# Patient Record
Sex: FEMALE | Race: WHITE | NOT HISPANIC OR LATINO | URBAN - METROPOLITAN AREA
[De-identification: names, ages, dates, MRNs, and addresses within clinical notes are randomized per-mention and may not be internally consistent; named-entity substitution may affect disease eponyms.]

---

## 2023-06-08 LAB
COPPER, URINE: NORMAL
COPPER/CREATININE RATIO URINE: NORMAL
CREATININE URINE (LC): NORMAL
VOLUME OF URINE (LC): NORMAL

## 2023-06-09 LAB
ALANINE AMINOTRANSFERASE (SGPT) (U/L) IN SER/PLAS: 76 U/L (ref 7–45)
ALBUMIN (G/DL) IN SER/PLAS: 4.5 G/DL (ref 3.4–5)
ALKALINE PHOSPHATASE (U/L) IN SER/PLAS: 49 U/L (ref 33–110)
ALPHA-1 FETOPROTEIN (NG/ML) IN SER/PLAS: <4 NG/ML (ref 0–9)
ANION GAP IN SER/PLAS: 18 MMOL/L (ref 10–20)
ASPARTATE AMINOTRANSFERASE (SGOT) (U/L) IN SER/PLAS: 106 U/L (ref 9–39)
BASOPHILS (10*3/UL) IN BLOOD BY AUTOMATED COUNT: 0.08 X10E9/L (ref 0–0.1)
BASOPHILS/100 LEUKOCYTES IN BLOOD BY AUTOMATED COUNT: 1 % (ref 0–2)
BILIRUBIN DIRECT (MG/DL) IN SER/PLAS: 0.1 MG/DL (ref 0–0.3)
BILIRUBIN TOTAL (MG/DL) IN SER/PLAS: 0.4 MG/DL (ref 0–1.2)
CALCIUM (MG/DL) IN SER/PLAS: 10.3 MG/DL (ref 8.6–10.6)
CARBON DIOXIDE, TOTAL (MMOL/L) IN SER/PLAS: 29 MMOL/L (ref 21–32)
CHLORIDE (MMOL/L) IN SER/PLAS: 103 MMOL/L (ref 98–107)
CREATININE (MG/DL) IN SER/PLAS: 0.81 MG/DL (ref 0.5–1.05)
EOSINOPHILS (10*3/UL) IN BLOOD BY AUTOMATED COUNT: 0.17 X10E9/L (ref 0–0.7)
EOSINOPHILS/100 LEUKOCYTES IN BLOOD BY AUTOMATED COUNT: 2.1 % (ref 0–6)
ERYTHROCYTE DISTRIBUTION WIDTH (RATIO) BY AUTOMATED COUNT: 12.7 % (ref 11.5–14.5)
ERYTHROCYTE MEAN CORPUSCULAR HEMOGLOBIN CONCENTRATION (G/DL) BY AUTOMATED: 32.2 G/DL (ref 32–36)
ERYTHROCYTE MEAN CORPUSCULAR VOLUME (FL) BY AUTOMATED COUNT: 97 FL (ref 80–100)
ERYTHROCYTES (10*6/UL) IN BLOOD BY AUTOMATED COUNT: 4.18 X10E12/L (ref 4–5.2)
FERRITIN (UG/LL) IN SER/PLAS: 108 UG/L (ref 8–150)
GFR FEMALE: 89 ML/MIN/1.73M2
GLUCOSE (MG/DL) IN SER/PLAS: 110 MG/DL (ref 74–99)
HEMATOCRIT (%) IN BLOOD BY AUTOMATED COUNT: 40.4 % (ref 36–46)
HEMOGLOBIN (G/DL) IN BLOOD: 13 G/DL (ref 12–16)
HEPATITIS A TOTAL AB INTERPRETATION: REACTIVE
HEPATITIS B VIRUS SURFACE AB (MIU/ML) IN SERUM: <3.1 MIU/ML
HEPATITIS B VIRUS SURFACE AG PRESENCE IN SERUM: NONREACTIVE
HEPATITIS C VIRUS AB PRESENCE IN SERUM: NONREACTIVE
IMMATURE GRANULOCYTES/100 LEUKOCYTES IN BLOOD BY AUTOMATED COUNT: 0.7 % (ref 0–0.9)
INR IN PPP BY COAGULATION ASSAY: 1 (ref 0.9–1.1)
IRON (UG/DL) IN SER/PLAS: 72 UG/DL (ref 35–150)
IRON BINDING CAPACITY (UG/DL) IN SER/PLAS: 484 UG/DL (ref 240–445)
IRON SATURATION (%) IN SER/PLAS: 15 % (ref 25–45)
LEUKOCYTES (10*3/UL) IN BLOOD BY AUTOMATED COUNT: 8.1 X10E9/L (ref 4.4–11.3)
LYMPHOCYTES (10*3/UL) IN BLOOD BY AUTOMATED COUNT: 2.53 X10E9/L (ref 1.2–4.8)
LYMPHOCYTES/100 LEUKOCYTES IN BLOOD BY AUTOMATED COUNT: 31.1 % (ref 13–44)
MONOCYTES (10*3/UL) IN BLOOD BY AUTOMATED COUNT: 0.4 X10E9/L (ref 0.1–1)
MONOCYTES/100 LEUKOCYTES IN BLOOD BY AUTOMATED COUNT: 4.9 % (ref 2–10)
NEUTROPHILS (10*3/UL) IN BLOOD BY AUTOMATED COUNT: 4.89 X10E9/L (ref 1.2–7.7)
NEUTROPHILS/100 LEUKOCYTES IN BLOOD BY AUTOMATED COUNT: 60.2 % (ref 40–80)
NRBC (PER 100 WBCS) BY AUTOMATED COUNT: 0 /100 WBC (ref 0–0)
PLATELETS (10*3/UL) IN BLOOD AUTOMATED COUNT: 393 X10E9/L (ref 150–450)
POTASSIUM (MMOL/L) IN SER/PLAS: 4 MMOL/L (ref 3.5–5.3)
PROTEIN TOTAL: 7.4 G/DL (ref 6.4–8.2)
PROTHROMBIN TIME (PT) IN PPP BY COAGULATION ASSAY: 12 SEC (ref 9.8–13.4)
SODIUM (MMOL/L) IN SER/PLAS: 146 MMOL/L (ref 136–145)
UREA NITROGEN (MG/DL) IN SER/PLAS: 17 MG/DL (ref 6–23)

## 2023-06-10 LAB
ANTI-NUCLEAR ANTIBODY (ANA): NEGATIVE
MITOCHONDRIAL ANTIBODY: NEGATIVE

## 2023-06-12 LAB
ALPHA-1 ANTITRYPSIN PHENOTYPE: NORMAL
ALPHA-1-ANTITRYPSIN (REF): 183 MG/DL (ref 90–200)

## 2023-06-13 LAB — ANTI-SMOOTH MUSCLE ANTIBODY: ABNORMAL

## 2023-07-27 LAB
ALANINE AMINOTRANSFERASE (SGPT) (U/L) IN SER/PLAS: 74 U/L (ref 7–45)
ALBUMIN (G/DL) IN SER/PLAS: 4.5 G/DL (ref 3.4–5)
ALKALINE PHOSPHATASE (U/L) IN SER/PLAS: 50 U/L (ref 33–110)
ANION GAP IN SER/PLAS: 16 MMOL/L (ref 10–20)
ASPARTATE AMINOTRANSFERASE (SGOT) (U/L) IN SER/PLAS: 117 U/L (ref 9–39)
BASOPHILS (10*3/UL) IN BLOOD BY AUTOMATED COUNT: 0.07 X10E9/L (ref 0–0.1)
BASOPHILS/100 LEUKOCYTES IN BLOOD BY AUTOMATED COUNT: 0.9 % (ref 0–2)
BILIRUBIN DIRECT (MG/DL) IN SER/PLAS: 0.1 MG/DL (ref 0–0.3)
BILIRUBIN TOTAL (MG/DL) IN SER/PLAS: 0.7 MG/DL (ref 0–1.2)
CALCIUM (MG/DL) IN SER/PLAS: 10.1 MG/DL (ref 8.6–10.6)
CARBON DIOXIDE, TOTAL (MMOL/L) IN SER/PLAS: 29 MMOL/L (ref 21–32)
CHLORIDE (MMOL/L) IN SER/PLAS: 102 MMOL/L (ref 98–107)
CREATININE (MG/DL) IN SER/PLAS: 0.72 MG/DL (ref 0.5–1.05)
EOSINOPHILS (10*3/UL) IN BLOOD BY AUTOMATED COUNT: 0.07 X10E9/L (ref 0–0.7)
EOSINOPHILS/100 LEUKOCYTES IN BLOOD BY AUTOMATED COUNT: 0.9 % (ref 0–6)
ERYTHROCYTE DISTRIBUTION WIDTH (RATIO) BY AUTOMATED COUNT: 12.9 % (ref 11.5–14.5)
ERYTHROCYTE MEAN CORPUSCULAR HEMOGLOBIN CONCENTRATION (G/DL) BY AUTOMATED: 31.8 G/DL (ref 32–36)
ERYTHROCYTE MEAN CORPUSCULAR VOLUME (FL) BY AUTOMATED COUNT: 97 FL (ref 80–100)
ERYTHROCYTES (10*6/UL) IN BLOOD BY AUTOMATED COUNT: 4.37 X10E12/L (ref 4–5.2)
GFR FEMALE: >90 ML/MIN/1.73M2
GLUCOSE (MG/DL) IN SER/PLAS: 101 MG/DL (ref 74–99)
HEMATOCRIT (%) IN BLOOD BY AUTOMATED COUNT: 42.2 % (ref 36–46)
HEMOGLOBIN (G/DL) IN BLOOD: 13.4 G/DL (ref 12–16)
IMMATURE GRANULOCYTES/100 LEUKOCYTES IN BLOOD BY AUTOMATED COUNT: 0.4 % (ref 0–0.9)
INR IN PPP BY COAGULATION ASSAY: 1.1 (ref 0.9–1.1)
LEUKOCYTES (10*3/UL) IN BLOOD BY AUTOMATED COUNT: 8.1 X10E9/L (ref 4.4–11.3)
LYMPHOCYTES (10*3/UL) IN BLOOD BY AUTOMATED COUNT: 2.21 X10E9/L (ref 1.2–4.8)
LYMPHOCYTES/100 LEUKOCYTES IN BLOOD BY AUTOMATED COUNT: 27.3 % (ref 13–44)
MONOCYTES (10*3/UL) IN BLOOD BY AUTOMATED COUNT: 0.41 X10E9/L (ref 0.1–1)
MONOCYTES/100 LEUKOCYTES IN BLOOD BY AUTOMATED COUNT: 5.1 % (ref 2–10)
NEUTROPHILS (10*3/UL) IN BLOOD BY AUTOMATED COUNT: 5.32 X10E9/L (ref 1.2–7.7)
NEUTROPHILS/100 LEUKOCYTES IN BLOOD BY AUTOMATED COUNT: 65.4 % (ref 40–80)
NRBC (PER 100 WBCS) BY AUTOMATED COUNT: 0 /100 WBC (ref 0–0)
PLATELETS (10*3/UL) IN BLOOD AUTOMATED COUNT: 342 X10E9/L (ref 150–450)
POTASSIUM (MMOL/L) IN SER/PLAS: 3.8 MMOL/L (ref 3.5–5.3)
PROTEIN TOTAL: 7.7 G/DL (ref 6.4–8.2)
PROTHROMBIN TIME (PT) IN PPP BY COAGULATION ASSAY: 12.3 SEC (ref 9.8–12.8)
SODIUM (MMOL/L) IN SER/PLAS: 143 MMOL/L (ref 136–145)
UREA NITROGEN (MG/DL) IN SER/PLAS: 15 MG/DL (ref 6–23)

## 2023-08-02 ENCOUNTER — HOSPITAL ENCOUNTER (OUTPATIENT)
Dept: DATA CONVERSION | Facility: HOSPITAL | Age: 48
End: 2023-08-02
Attending: STUDENT IN AN ORGANIZED HEALTH CARE EDUCATION/TRAINING PROGRAM | Admitting: STUDENT IN AN ORGANIZED HEALTH CARE EDUCATION/TRAINING PROGRAM
Payer: MEDICARE

## 2023-08-02 DIAGNOSIS — K74.60 UNSPECIFIED CIRRHOSIS OF LIVER (MULTI): ICD-10-CM

## 2023-08-02 DIAGNOSIS — R74.01 ELEVATION OF LEVELS OF LIVER TRANSAMINASE LEVELS: ICD-10-CM

## 2023-08-15 LAB
COMPLETE PATHOLOGY REPORT: NORMAL
CONVERTED ADDENDUM DIAGNOSIS 2: NORMAL
CONVERTED CLINICAL DIAGNOSIS-HISTORY: NORMAL
CONVERTED FINAL DIAGNOSIS: NORMAL
CONVERTED FINAL REPORT PDF LINK TO COPY AND PASTE: NORMAL
CONVERTED GROSS DESCRIPTION: NORMAL

## 2023-09-08 LAB — CERULOPLASMIN (MG/DL) IN SER/PLAS: 31 MG/DL (ref 20–60)

## 2023-09-29 VITALS — BODY MASS INDEX: 29.26 KG/M2 | HEIGHT: 56 IN | WEIGHT: 130.07 LBS

## 2023-10-02 ENCOUNTER — APPOINTMENT (OUTPATIENT)
Dept: LAB | Facility: LAB | Age: 48
End: 2023-10-02
Payer: MEDICARE

## 2023-10-02 ENCOUNTER — LAB (OUTPATIENT)
Dept: LAB | Facility: LAB | Age: 48
End: 2023-10-02
Payer: MEDICARE

## 2023-10-02 DIAGNOSIS — R74.01 ELEVATION OF LEVELS OF LIVER TRANSAMINASE LEVELS: Primary | ICD-10-CM

## 2023-10-02 PROCEDURE — 36415 COLL VENOUS BLD VENIPUNCTURE: CPT

## 2023-10-13 LAB — SCAN RESULT: NORMAL

## 2023-10-18 ENCOUNTER — TELEPHONE (OUTPATIENT)
Dept: GASTROENTEROLOGY | Facility: CLINIC | Age: 48
End: 2023-10-18
Payer: MEDICARE

## 2023-10-18 DIAGNOSIS — E83.01: ICD-10-CM

## 2023-10-18 DIAGNOSIS — K74.60 HEPATIC CIRRHOSIS, UNSPECIFIED HEPATIC CIRRHOSIS TYPE, UNSPECIFIED WHETHER ASCITES PRESENT (MULTI): ICD-10-CM

## 2023-10-18 RX ORDER — ZINC GLUCONATE 50 MG
50 TABLET ORAL 2 TIMES DAILY
Qty: 60 TABLET | Refills: 11 | Status: SHIPPED | OUTPATIENT
Start: 2023-10-18

## 2023-10-18 NOTE — TELEPHONE ENCOUNTER
Result Communication    Resulted Orders   wilison discese test; [facility] - Miscellaneous Genetics Test   Result Value Ref Range    Scan Result See Scanned Result        9:10 AM      Results were successfully communicated with the mother and they acknowledged their understanding.

## 2023-10-18 NOTE — TELEPHONE ENCOUNTER
Called patient to discuss results and plan per Dr Vann:  Spoke to patients mother.   Start zinc elemental dosing of 50 mg BID. She should work on JACKSON risk factors. She can see us back in clinic in about 2 months. She can get US and AFP and MELD labs before that.

## 2023-12-21 DIAGNOSIS — E78.5 HYPERLIPIDEMIA, UNSPECIFIED: ICD-10-CM

## 2023-12-21 RX ORDER — LOVASTATIN 20 MG/1
TABLET ORAL
Qty: 90 TABLET | Refills: 1 | Status: SHIPPED | OUTPATIENT
Start: 2023-12-21

## 2024-01-10 ENCOUNTER — ANCILLARY PROCEDURE (OUTPATIENT)
Dept: RADIOLOGY | Facility: CLINIC | Age: 49
End: 2024-01-10
Payer: MEDICARE

## 2024-01-10 DIAGNOSIS — E83.01: ICD-10-CM

## 2024-01-10 PROCEDURE — 76705 ECHO EXAM OF ABDOMEN: CPT

## 2024-01-10 PROCEDURE — 76705 ECHO EXAM OF ABDOMEN: CPT | Performed by: RADIOLOGY

## 2024-01-17 PROCEDURE — 82248 BILIRUBIN DIRECT: CPT | Performed by: INTERNAL MEDICINE

## 2024-01-17 PROCEDURE — 85610 PROTHROMBIN TIME: CPT | Performed by: INTERNAL MEDICINE

## 2024-01-17 PROCEDURE — 80048 BASIC METABOLIC PNL TOTAL CA: CPT | Performed by: INTERNAL MEDICINE

## 2024-01-17 PROCEDURE — 82105 ALPHA-FETOPROTEIN SERUM: CPT | Performed by: INTERNAL MEDICINE

## 2024-01-21 DIAGNOSIS — E03.9 HYPOTHYROIDISM, UNSPECIFIED: ICD-10-CM

## 2024-01-21 DIAGNOSIS — E78.5 HYPERLIPIDEMIA, UNSPECIFIED: ICD-10-CM

## 2024-01-22 RX ORDER — FENOFIBRATE 134 MG/1
CAPSULE ORAL
Qty: 30 CAPSULE | Refills: 0 | Status: SHIPPED | OUTPATIENT
Start: 2024-01-22 | End: 2024-02-15

## 2024-01-22 RX ORDER — LEVOTHYROXINE SODIUM 25 UG/1
TABLET ORAL
Qty: 30 TABLET | Refills: 0 | Status: SHIPPED | OUTPATIENT
Start: 2024-01-22 | End: 2024-02-15

## 2024-01-31 ENCOUNTER — OFFICE VISIT (OUTPATIENT)
Dept: GASTROENTEROLOGY | Facility: CLINIC | Age: 49
End: 2024-01-31
Payer: MEDICARE

## 2024-01-31 VITALS
DIASTOLIC BLOOD PRESSURE: 80 MMHG | WEIGHT: 121 LBS | TEMPERATURE: 97.4 F | HEIGHT: 56 IN | BODY MASS INDEX: 27.22 KG/M2 | HEART RATE: 96 BPM | SYSTOLIC BLOOD PRESSURE: 121 MMHG

## 2024-01-31 DIAGNOSIS — K74.60 HEPATIC CIRRHOSIS, UNSPECIFIED HEPATIC CIRRHOSIS TYPE, UNSPECIFIED WHETHER ASCITES PRESENT (MULTI): ICD-10-CM

## 2024-01-31 DIAGNOSIS — K74.60 ESOPHAGEAL VARICES IN CIRRHOSIS (MULTI): ICD-10-CM

## 2024-01-31 DIAGNOSIS — I85.10 ESOPHAGEAL VARICES IN CIRRHOSIS (MULTI): ICD-10-CM

## 2024-01-31 PROCEDURE — 99214 OFFICE O/P EST MOD 30 MIN: CPT | Performed by: INTERNAL MEDICINE

## 2024-01-31 RX ORDER — ACETAMINOPHEN 500 MG
1 TABLET ORAL DAILY
COMMUNITY
Start: 2014-02-25

## 2024-01-31 RX ORDER — ASPIRIN 325 MG
TABLET ORAL
COMMUNITY

## 2024-01-31 RX ORDER — FLUTICASONE PROPIONATE 50 MCG
1 SPRAY, SUSPENSION (ML) NASAL 2 TIMES DAILY
COMMUNITY
Start: 2023-12-16 | End: 2024-05-21 | Stop reason: ALTCHOICE

## 2024-01-31 RX ORDER — FLUOXETINE HYDROCHLORIDE 20 MG/1
60 CAPSULE ORAL
COMMUNITY
Start: 2014-09-15

## 2024-01-31 RX ORDER — DIVALPROEX SODIUM 500 MG/1
500 TABLET, DELAYED RELEASE ORAL
COMMUNITY

## 2024-01-31 RX ORDER — ONDANSETRON 4 MG/1
1 TABLET, FILM COATED ORAL EVERY 8 HOURS PRN
COMMUNITY
Start: 2019-12-16 | End: 2024-05-21 | Stop reason: ALTCHOICE

## 2024-01-31 RX ORDER — BROMPHENIRAMINE MALEATE, PSEUDOEPHEDRINE HYDROCHLORIDE, AND DEXTROMETHORPHAN HYDROBROMIDE 2; 30; 10 MG/5ML; MG/5ML; MG/5ML
10 SYRUP ORAL EVERY 6 HOURS PRN
COMMUNITY
Start: 2023-12-16 | End: 2024-05-21 | Stop reason: ALTCHOICE

## 2024-01-31 RX ORDER — DIVALPROEX SODIUM 250 MG/1
250 TABLET, FILM COATED, EXTENDED RELEASE ORAL DAILY
COMMUNITY
Start: 2014-04-10

## 2024-01-31 RX ORDER — ARIPIPRAZOLE 15 MG/1
15 TABLET ORAL
COMMUNITY
Start: 2015-07-14

## 2024-01-31 RX ORDER — ACETAMINOPHEN 500 MG
TABLET ORAL
COMMUNITY

## 2024-01-31 RX ORDER — ASCORBIC ACID 125 MG
TABLET,CHEWABLE ORAL
COMMUNITY

## 2024-01-31 RX ORDER — FLUOXETINE HYDROCHLORIDE 60 MG/1
TABLET, FILM COATED ORAL; ORAL
COMMUNITY
Start: 2023-01-31

## 2024-01-31 NOTE — PATIENT INSTRUCTIONS
Work on diet and exercise.    Get labs in 3 months and in 6 months.    Continue the zinc.    Get an ultrasound in 6 months.    Get an upper endoscopy.    Make an appointment to see me back in clinic in about 7 months.    Check with your insurance company about arranging hepatitis B vaccines.

## 2024-02-07 ENCOUNTER — OFFICE VISIT (OUTPATIENT)
Dept: PRIMARY CARE | Facility: CLINIC | Age: 49
End: 2024-02-07
Payer: MEDICARE

## 2024-02-07 VITALS
DIASTOLIC BLOOD PRESSURE: 73 MMHG | HEART RATE: 96 BPM | BODY MASS INDEX: 27.58 KG/M2 | WEIGHT: 123 LBS | SYSTOLIC BLOOD PRESSURE: 123 MMHG | TEMPERATURE: 97 F | OXYGEN SATURATION: 98 %

## 2024-02-07 DIAGNOSIS — K74.69 OTHER CIRRHOSIS OF LIVER (MULTI): ICD-10-CM

## 2024-02-07 DIAGNOSIS — Z00.00 MEDICARE ANNUAL WELLNESS VISIT, SUBSEQUENT: Primary | ICD-10-CM

## 2024-02-07 DIAGNOSIS — E55.9 VITAMIN D DEFICIENCY: ICD-10-CM

## 2024-02-07 DIAGNOSIS — R74.01 HIGH ALANINE AMINOTRANSFERASE (ALT) LEVEL: ICD-10-CM

## 2024-02-07 DIAGNOSIS — E78.5 HYPERLIPIDEMIA, UNSPECIFIED HYPERLIPIDEMIA TYPE: ICD-10-CM

## 2024-02-07 DIAGNOSIS — E03.9 HYPOTHYROIDISM, UNSPECIFIED TYPE: ICD-10-CM

## 2024-02-07 DIAGNOSIS — F34.81 DISRUPTIVE MOOD DYSREGULATION DISORDER (MULTI): Chronic | ICD-10-CM

## 2024-02-07 PROBLEM — E66.3 OVERWEIGHT (BMI 25.0-29.9): Status: ACTIVE | Noted: 2022-12-02

## 2024-02-07 PROBLEM — R79.89 ELEVATED PLATELET COUNT: Status: ACTIVE | Noted: 2024-02-07

## 2024-02-07 PROBLEM — E83.52 HYPERCALCEMIA: Status: ACTIVE | Noted: 2024-02-07

## 2024-02-07 PROBLEM — I89.0 LYMPHEDEMA: Status: ACTIVE | Noted: 2024-02-07

## 2024-02-07 PROBLEM — R79.89 ELEVATED PLATELET COUNT: Status: RESOLVED | Noted: 2024-02-07 | Resolved: 2024-02-07

## 2024-02-07 PROBLEM — R92.8 MAMMOGRAM ABNORMAL: Status: RESOLVED | Noted: 2024-02-07 | Resolved: 2024-02-07

## 2024-02-07 PROBLEM — D13.4 ADENOMA OF LIVER: Status: ACTIVE | Noted: 2018-08-06

## 2024-02-07 PROBLEM — Q93.82 WILLIAMS SYNDROME (HHS-HCC): Status: ACTIVE | Noted: 2024-02-07

## 2024-02-07 PROBLEM — E83.52 HYPERCALCEMIA: Status: RESOLVED | Noted: 2024-02-07 | Resolved: 2024-02-07

## 2024-02-07 PROBLEM — R25.1 TREMOR: Status: ACTIVE | Noted: 2024-02-07

## 2024-02-07 PROBLEM — R92.8 MAMMOGRAM ABNORMAL: Status: ACTIVE | Noted: 2024-02-07

## 2024-02-07 PROCEDURE — 99212 OFFICE O/P EST SF 10 MIN: CPT | Performed by: PEDIATRICS

## 2024-02-07 PROCEDURE — G0439 PPPS, SUBSEQ VISIT: HCPCS | Performed by: PEDIATRICS

## 2024-02-07 ASSESSMENT — PATIENT HEALTH QUESTIONNAIRE - PHQ9
2. FEELING DOWN, DEPRESSED OR HOPELESS: NOT AT ALL
1. LITTLE INTEREST OR PLEASURE IN DOING THINGS: NOT AT ALL
SUM OF ALL RESPONSES TO PHQ9 QUESTIONS 1 AND 2: 0

## 2024-02-07 ASSESSMENT — ACTIVITIES OF DAILY LIVING (ADL)
GROCERY_SHOPPING: TOTAL CARE
TAKING_MEDICATION: TOTAL CARE
DRESSING: INDEPENDENT
DOING_HOUSEWORK: NEEDS ASSISTANCE
MANAGING_FINANCES: TOTAL CARE
BATHING: INDEPENDENT

## 2024-02-07 NOTE — PROGRESS NOTES
Subjective   Patient ID: Elizabeth Toledo is a 48 y.o. female who presents for Medicare Wellness    HPI   Mammogram done November 2023 was negative. Due every year. Cologuard done January 2023 was normal.   Saw GYN last year  Seeing hepatology for cirrhosis of unclear etiology. Needs hep B vaccine    Review of Systems    Objective   /73   Pulse 96   Temp 36.1 °C (97 °F)   Wt 55.8 kg (123 lb)   SpO2 98%   BMI 27.58 kg/m²     Physical Exam  HEENT neg  Lymphedema left hand and right calf  Lungs clear  Heart RRR  Abd soft  Some resting tremor of mouth and right hand  Assessment/Plan   Problem List Items Addressed This Visit             ICD-10-CM    High alanine aminotransferase (ALT) level R74.01    Hyperlipidemia E78.5    Relevant Orders    Lipid panel    Hypothyroidism E03.9    Relevant Orders    TSH    Disruptive mood dysregulation disorder (CMS/HCC) (Chronic) F34.81    Relevant Orders    Valproic Acid    Vitamin D deficiency E55.9    Relevant Orders    Vitamin D 25-Hydroxy,Total (for eval of Vitamin D levels)    Other cirrhosis of liver (CMS/HCC) K74.69     Other Visit Diagnoses         Codes    Medicare annual wellness visit, subsequent    -  Primary Z00.00

## 2024-02-08 DIAGNOSIS — K74.69 OTHER CIRRHOSIS OF LIVER (MULTI): ICD-10-CM

## 2024-02-14 DIAGNOSIS — E78.5 HYPERLIPIDEMIA, UNSPECIFIED: ICD-10-CM

## 2024-02-14 DIAGNOSIS — E03.9 HYPOTHYROIDISM, UNSPECIFIED: ICD-10-CM

## 2024-02-15 RX ORDER — FENOFIBRATE 134 MG/1
CAPSULE ORAL
Qty: 90 CAPSULE | Refills: 1 | Status: SHIPPED | OUTPATIENT
Start: 2024-02-15

## 2024-02-15 RX ORDER — LEVOTHYROXINE SODIUM 25 UG/1
TABLET ORAL
Qty: 30 TABLET | Refills: 0 | Status: SHIPPED | OUTPATIENT
Start: 2024-02-15 | End: 2024-03-12

## 2024-02-19 ENCOUNTER — TELEPHONE (OUTPATIENT)
Dept: PRIMARY CARE | Facility: CLINIC | Age: 49
End: 2024-02-19

## 2024-02-19 ENCOUNTER — LAB (OUTPATIENT)
Dept: LAB | Facility: LAB | Age: 49
End: 2024-02-19
Payer: MEDICARE

## 2024-02-19 DIAGNOSIS — E78.5 HYPERLIPIDEMIA, UNSPECIFIED: ICD-10-CM

## 2024-02-19 DIAGNOSIS — F34.81 DISRUPTIVE MOOD DYSREGULATION DISORDER (MULTI): ICD-10-CM

## 2024-02-19 DIAGNOSIS — E55.9 VITAMIN D DEFICIENCY, UNSPECIFIED: Primary | ICD-10-CM

## 2024-02-19 DIAGNOSIS — E03.9 HYPOTHYROIDISM, UNSPECIFIED: ICD-10-CM

## 2024-02-26 ENCOUNTER — LAB (OUTPATIENT)
Dept: LAB | Facility: LAB | Age: 49
End: 2024-02-26
Payer: MEDICARE

## 2024-02-26 DIAGNOSIS — E78.5 HYPERLIPIDEMIA, UNSPECIFIED: ICD-10-CM

## 2024-02-26 DIAGNOSIS — F34.81 DISRUPTIVE MOOD DYSREGULATION DISORDER (MULTI): ICD-10-CM

## 2024-02-26 DIAGNOSIS — E55.9 VITAMIN D DEFICIENCY, UNSPECIFIED: ICD-10-CM

## 2024-02-26 DIAGNOSIS — E03.9 HYPOTHYROIDISM, UNSPECIFIED: ICD-10-CM

## 2024-02-26 PROCEDURE — 80164 ASSAY DIPROPYLACETIC ACD TOT: CPT

## 2024-02-26 PROCEDURE — 36415 COLL VENOUS BLD VENIPUNCTURE: CPT

## 2024-02-26 PROCEDURE — 80061 LIPID PANEL: CPT

## 2024-02-26 PROCEDURE — 82306 VITAMIN D 25 HYDROXY: CPT

## 2024-02-26 PROCEDURE — 84443 ASSAY THYROID STIM HORMONE: CPT

## 2024-02-27 LAB
25(OH)D3 SERPL-MCNC: 77 NG/ML (ref 30–100)
CHOLEST SERPL-MCNC: 195 MG/DL (ref 0–199)
CHOLESTEROL/HDL RATIO: 3.5
HDLC SERPL-MCNC: 55.4 MG/DL
LDLC SERPL CALC-MCNC: 112 MG/DL
NON HDL CHOLESTEROL: 140 MG/DL (ref 0–149)
TRIGL SERPL-MCNC: 136 MG/DL (ref 0–149)
TSH SERPL-ACNC: 3.66 MIU/L (ref 0.44–3.98)
VALPROATE SERPL-MCNC: 41 UG/ML (ref 50–100)
VLDL: 27 MG/DL (ref 0–40)

## 2024-03-11 DIAGNOSIS — E03.9 HYPOTHYROIDISM, UNSPECIFIED: ICD-10-CM

## 2024-03-12 RX ORDER — LEVOTHYROXINE SODIUM 25 UG/1
TABLET ORAL
Qty: 90 TABLET | Refills: 3 | Status: SHIPPED | OUTPATIENT
Start: 2024-03-12

## 2024-03-20 ENCOUNTER — TELEPHONE (OUTPATIENT)
Dept: PRIMARY CARE | Facility: CLINIC | Age: 49
End: 2024-03-20
Payer: MEDICARE

## 2024-03-20 DIAGNOSIS — I89.0 LYMPHEDEMA: Primary | ICD-10-CM

## 2024-05-08 ENCOUNTER — LAB (OUTPATIENT)
Dept: LAB | Facility: LAB | Age: 49
End: 2024-05-08
Payer: MEDICARE

## 2024-05-08 DIAGNOSIS — K74.60 HEPATIC CIRRHOSIS, UNSPECIFIED HEPATIC CIRRHOSIS TYPE, UNSPECIFIED WHETHER ASCITES PRESENT (MULTI): ICD-10-CM

## 2024-05-08 PROCEDURE — 85610 PROTHROMBIN TIME: CPT

## 2024-05-08 PROCEDURE — 82248 BILIRUBIN DIRECT: CPT

## 2024-05-08 PROCEDURE — 80053 COMPREHEN METABOLIC PANEL: CPT

## 2024-05-08 PROCEDURE — 36415 COLL VENOUS BLD VENIPUNCTURE: CPT

## 2024-05-08 PROCEDURE — 85025 COMPLETE CBC W/AUTO DIFF WBC: CPT

## 2024-05-09 ENCOUNTER — OFFICE VISIT (OUTPATIENT)
Dept: CARDIOLOGY | Facility: CLINIC | Age: 49
End: 2024-05-09
Payer: MEDICARE

## 2024-05-09 VITALS
HEART RATE: 82 BPM | WEIGHT: 123.5 LBS | OXYGEN SATURATION: 98 % | DIASTOLIC BLOOD PRESSURE: 84 MMHG | HEIGHT: 56 IN | SYSTOLIC BLOOD PRESSURE: 121 MMHG | BODY MASS INDEX: 27.78 KG/M2 | RESPIRATION RATE: 17 BRPM

## 2024-05-09 DIAGNOSIS — I87.2 CHRONIC VENOUS INSUFFICIENCY: Primary | ICD-10-CM

## 2024-05-09 DIAGNOSIS — I89.0 LYMPHEDEMA: ICD-10-CM

## 2024-05-09 LAB
ALBUMIN SERPL BCP-MCNC: 4.4 G/DL (ref 3.4–5)
ALP SERPL-CCNC: 84 U/L (ref 33–110)
ALT SERPL W P-5'-P-CCNC: 55 U/L (ref 7–45)
ANION GAP SERPL CALC-SCNC: 16 MMOL/L (ref 10–20)
AST SERPL W P-5'-P-CCNC: 89 U/L (ref 9–39)
BASOPHILS # BLD AUTO: 0.07 X10*3/UL (ref 0–0.1)
BASOPHILS NFR BLD AUTO: 0.9 %
BILIRUB DIRECT SERPL-MCNC: 0.2 MG/DL (ref 0–0.3)
BILIRUB SERPL-MCNC: 0.7 MG/DL (ref 0–1.2)
BUN SERPL-MCNC: 10 MG/DL (ref 6–23)
CALCIUM SERPL-MCNC: 9.6 MG/DL (ref 8.6–10.6)
CHLORIDE SERPL-SCNC: 103 MMOL/L (ref 98–107)
CO2 SERPL-SCNC: 27 MMOL/L (ref 21–32)
CREAT SERPL-MCNC: 0.64 MG/DL (ref 0.5–1.05)
EGFRCR SERPLBLD CKD-EPI 2021: >90 ML/MIN/1.73M*2
EOSINOPHIL # BLD AUTO: 0.1 X10*3/UL (ref 0–0.7)
EOSINOPHIL NFR BLD AUTO: 1.4 %
ERYTHROCYTE [DISTWIDTH] IN BLOOD BY AUTOMATED COUNT: 13 % (ref 11.5–14.5)
GLUCOSE SERPL-MCNC: 119 MG/DL (ref 74–99)
HCT VFR BLD AUTO: 41 % (ref 36–46)
HGB BLD-MCNC: 13.5 G/DL (ref 12–16)
IMM GRANULOCYTES # BLD AUTO: 0.04 X10*3/UL (ref 0–0.7)
IMM GRANULOCYTES NFR BLD AUTO: 0.5 % (ref 0–0.9)
INR PPP: 0.9 (ref 0.9–1.1)
LYMPHOCYTES # BLD AUTO: 1.83 X10*3/UL (ref 1.2–4.8)
LYMPHOCYTES NFR BLD AUTO: 24.8 %
MCH RBC QN AUTO: 31.8 PG (ref 26–34)
MCHC RBC AUTO-ENTMCNC: 32.9 G/DL (ref 32–36)
MCV RBC AUTO: 97 FL (ref 80–100)
MONOCYTES # BLD AUTO: 0.34 X10*3/UL (ref 0.1–1)
MONOCYTES NFR BLD AUTO: 4.6 %
NEUTROPHILS # BLD AUTO: 5 X10*3/UL (ref 1.2–7.7)
NEUTROPHILS NFR BLD AUTO: 67.8 %
NRBC BLD-RTO: 0 /100 WBCS (ref 0–0)
PLATELET # BLD AUTO: 284 X10*3/UL (ref 150–450)
POTASSIUM SERPL-SCNC: 4 MMOL/L (ref 3.5–5.3)
PROT SERPL-MCNC: 7.8 G/DL (ref 6.4–8.2)
PROTHROMBIN TIME: 10.5 SECONDS (ref 9.8–12.8)
RBC # BLD AUTO: 4.25 X10*6/UL (ref 4–5.2)
SODIUM SERPL-SCNC: 142 MMOL/L (ref 136–145)
WBC # BLD AUTO: 7.4 X10*3/UL (ref 4.4–11.3)

## 2024-05-09 PROCEDURE — 1036F TOBACCO NON-USER: CPT | Performed by: INTERNAL MEDICINE

## 2024-05-09 PROCEDURE — 99213 OFFICE O/P EST LOW 20 MIN: CPT | Performed by: INTERNAL MEDICINE

## 2024-05-09 PROCEDURE — 99203 OFFICE O/P NEW LOW 30 MIN: CPT | Performed by: INTERNAL MEDICINE

## 2024-05-09 ASSESSMENT — PAIN SCALES - GENERAL: PAINLEVEL: 0-NO PAIN

## 2024-05-09 NOTE — PROGRESS NOTES
Referred by Gretchen Hwang MD for lymphedema    No chief complaint on file.      History of Present Illness:  Elizabeth Toledo is a/an 48 y.o. woman accompanied by her parents who help provide some of the history. Ms. Toledo is here for evaluation of left leg swelling. It has been present for about 10 years and is painless and does not bother the patient. She has mild varicose veins that her mother notes more prominently on the left leg than the right. She has not worn compression socks. She is fairly active; goes to a day program where she might do arts and crafts or dancing or other physical activities.     Swelling is somewhat worse toward the end of the day. Mom notices that the calf feels full.    No past medical history on file.  Past Surgical History:   Procedure Laterality Date    US GUIDED NEEDLE LIVER BIOPSY  8/2/2023    US GUIDED NEEDLE LIVER BIOPSY 8/2/2023 [facility]     Social History     Tobacco Use    Smoking status: Never     Passive exposure: Never    Smokeless tobacco: Never     No family history on file.  Current Outpatient Medications   Medication Sig Dispense Refill    ARIPiprazole (Abilify) 15 mg tablet Take 1 tablet (15 mg) by mouth once daily.      cholecalciferol (Vitamin D-3) 50 mcg (2,000 unit) capsule Take by mouth.      divalproex (Depakote ER) 250 mg 24 hr tablet Take 1 tablet (250 mg) by mouth once daily. At night      fenofibrate micronized (Lofibra) 134 mg capsule TAKE 1 CAPSULE BY MOUTH EVERY DAY 90 capsule 1    FLUoxetine (PROzac) 20 mg capsule Take 3 capsules (60 mg) by mouth once daily.      hepatitis B virus vacc.rec,PF, (ENGERIX-B) 20 mcg/mL syringe Give a dose now and in 2 months 1 mL 2    levothyroxine (Synthroid, Levoxyl) 25 mcg tablet TAKE 1 TABLET BY MOUTH EVERY DAY 90 tablet 3    lovastatin (Mevacor) 20 mg tablet TAKE 1 TABLET BY MOUTH EVERY DAY 90 tablet 1    multivitamin (One Daily Multivitamin) tablet Take by mouth.      zinc gluconate 50 mg tablet Take 1 tablet (50 mg of  "elemental zinc) by mouth 2 times a day. 60 tablet 11    brompheniramine-pseudoeph-DM 2-30-10 mg/5 mL syrup Take 10 mL by mouth every 6 hours if needed.      cholecalciferol (Vitamin D-3) 5,000 Units tablet Take 1 tablet (5,000 Units) by mouth once daily.      cholecalciferol, vitamin D3, 25 mcg (1,000 unit) tablet,chewable Chew.      divalproex (Depakote) 500 mg EC tablet Take 1 tablet (500 mg) by mouth.      FLUoxetine (PROzac) 60 mg tablet Take by mouth.      fluticasone (Flonase) 50 mcg/actuation nasal spray 1 spray by Does not apply route twice a day.      ondansetron (Zofran) 4 mg tablet Take 1 tablet (4 mg) by mouth every 8 hours if needed.       No current facility-administered medications for this visit.       Physical Examination:  Blood pressure 121/84, pulse 82, resp. rate 17, height 1.422 m (4' 8\"), weight 56 kg (123 lb 8 oz), SpO2 98%.  General: well-developed, well-nourished, no distress, short stature  Head: normocephalic, atraumatic  Eyes: PERRL, anicteric sclerae, no conjunctival injection  ENT: normal oropharynx  Neck: supple, no carotid bruits, no JVD  Lungs: normal respiratory effort, clear to auscultation bilaterally  Heart: regular, normal S1 and S2, no murmurs, rubs or gallops  Abdomen: normal active bowel sounds, soft, non-distended, non-tender  Extremities: no cyanosis or clubbing  Vascular: mild right leg edema with slight squaring of toes, pulses 2+ and symmetric; bilateral spider and reticular veins  Musculoskeletal: no deformities  Neurological: alert and oriented, no gross neurological deficits  Psychological: normal mood and affect   Skin: warm and dry, no rashes or lesions      Pertinent Labs:    Pertinent Imaging:    Encounter Diagnoses   Name Primary?    Lymphedema     Chronic venous insufficiency Yes   You can get compression socks at [employer] or any other store that sells durable medical goods.     Once you have decided where you are going to go for socks, call in " advance and find out when the person who measures you for socks will be there. Plan to go fairly early in the day. Many people have swelling that is worse toward the end of the day. If you are measured at the end of the day, you may not get a good fit.    You should put on your compression socks first thing in the morning. Take them off before bed.    If you use lotion or moisturizer on your legs, do NOT put lotion or moisturizer on immediately before you put on your socks, as it will make them difficult to pull up. Use your lotion or moisturizer at night.    Compression socks can be hand-washed or washed in a mesh bag in the washing machine. Air dry them. Do NOT put them in the dryer.        Yesika Vides MD, MS

## 2024-05-09 NOTE — PATIENT INSTRUCTIONS
You can get compression socks at [employer] or any other store that sells durable medical goods.     Once you have decided where you are going to go for socks, call in advance and find out when the person who measures you for socks will be there. Plan to go fairly early in the day. Many people have swelling that is worse toward the end of the day. If you are measured at the end of the day, you may not get a good fit.    You should put on your compression socks first thing in the morning. Take them off before bed.    If you use lotion or moisturizer on your legs, do NOT put lotion or moisturizer on immediately before you put on your socks, as it will make them difficult to pull up. Use your lotion or moisturizer at night.    Compression socks can be hand-washed or washed in a mesh bag in the washing machine. Air dry them. Do NOT put them in the dryer.

## 2024-05-29 ENCOUNTER — ANESTHESIA (OUTPATIENT)
Dept: GASTROENTEROLOGY | Facility: HOSPITAL | Age: 49
End: 2024-05-29
Payer: MEDICARE

## 2024-05-29 ENCOUNTER — HOSPITAL ENCOUNTER (OUTPATIENT)
Dept: GASTROENTEROLOGY | Facility: HOSPITAL | Age: 49
Discharge: HOME | End: 2024-05-29
Payer: MEDICARE

## 2024-05-29 ENCOUNTER — ANESTHESIA EVENT (OUTPATIENT)
Dept: GASTROENTEROLOGY | Facility: HOSPITAL | Age: 49
End: 2024-05-29
Payer: MEDICARE

## 2024-05-29 VITALS
RESPIRATION RATE: 20 BRPM | HEIGHT: 56 IN | SYSTOLIC BLOOD PRESSURE: 118 MMHG | DIASTOLIC BLOOD PRESSURE: 77 MMHG | TEMPERATURE: 97.3 F | OXYGEN SATURATION: 98 % | WEIGHT: 118.61 LBS | BODY MASS INDEX: 26.68 KG/M2 | HEART RATE: 95 BPM

## 2024-05-29 DIAGNOSIS — K74.60 ESOPHAGEAL VARICES IN CIRRHOSIS (MULTI): ICD-10-CM

## 2024-05-29 DIAGNOSIS — I85.10 ESOPHAGEAL VARICES IN CIRRHOSIS (MULTI): ICD-10-CM

## 2024-05-29 DIAGNOSIS — K74.60 HEPATIC CIRRHOSIS, UNSPECIFIED HEPATIC CIRRHOSIS TYPE, UNSPECIFIED WHETHER ASCITES PRESENT (MULTI): ICD-10-CM

## 2024-05-29 PROCEDURE — 3700000002 HC GENERAL ANESTHESIA TIME - EACH INCREMENTAL 1 MINUTE

## 2024-05-29 PROCEDURE — 7100000009 HC PHASE TWO TIME - INITIAL BASE CHARGE

## 2024-05-29 PROCEDURE — 88305 TISSUE EXAM BY PATHOLOGIST: CPT | Mod: TC,AHULAB | Performed by: INTERNAL MEDICINE

## 2024-05-29 PROCEDURE — 3700000001 HC GENERAL ANESTHESIA TIME - INITIAL BASE CHARGE

## 2024-05-29 PROCEDURE — 2500000004 HC RX 250 GENERAL PHARMACY W/ HCPCS (ALT 636 FOR OP/ED): Performed by: ANESTHESIOLOGIST ASSISTANT

## 2024-05-29 PROCEDURE — 43239 EGD BIOPSY SINGLE/MULTIPLE: CPT | Performed by: INTERNAL MEDICINE

## 2024-05-29 PROCEDURE — 7100000010 HC PHASE TWO TIME - EACH INCREMENTAL 1 MINUTE

## 2024-05-29 RX ORDER — MIDAZOLAM HYDROCHLORIDE 1 MG/ML
INJECTION INTRAMUSCULAR; INTRAVENOUS AS NEEDED
Status: DISCONTINUED | OUTPATIENT
Start: 2024-05-29 | End: 2024-05-29

## 2024-05-29 RX ORDER — SODIUM CHLORIDE, SODIUM LACTATE, POTASSIUM CHLORIDE, CALCIUM CHLORIDE 600; 310; 30; 20 MG/100ML; MG/100ML; MG/100ML; MG/100ML
INJECTION, SOLUTION INTRAVENOUS CONTINUOUS PRN
Status: DISCONTINUED | OUTPATIENT
Start: 2024-05-29 | End: 2024-05-29

## 2024-05-29 RX ORDER — PROPOFOL 10 MG/ML
INJECTION, EMULSION INTRAVENOUS AS NEEDED
Status: DISCONTINUED | OUTPATIENT
Start: 2024-05-29 | End: 2024-05-29

## 2024-05-29 RX ADMIN — SODIUM CHLORIDE, POTASSIUM CHLORIDE, SODIUM LACTATE AND CALCIUM CHLORIDE: 600; 310; 30; 20 INJECTION, SOLUTION INTRAVENOUS at 10:36

## 2024-05-29 RX ADMIN — MIDAZOLAM HYDROCHLORIDE 2 MG: 1 INJECTION INTRAMUSCULAR; INTRAVENOUS at 10:34

## 2024-05-29 RX ADMIN — PROPOFOL 200 MG: 10 INJECTION, EMULSION INTRAVENOUS at 10:39

## 2024-05-29 ASSESSMENT — PAIN - FUNCTIONAL ASSESSMENT
PAIN_FUNCTIONAL_ASSESSMENT: 0-10
PAIN_FUNCTIONAL_ASSESSMENT: 0-10
PAIN_FUNCTIONAL_ASSESSMENT: UNABLE TO SELF-REPORT
PAIN_FUNCTIONAL_ASSESSMENT: 0-10

## 2024-05-29 ASSESSMENT — COLUMBIA-SUICIDE SEVERITY RATING SCALE - C-SSRS
6. HAVE YOU EVER DONE ANYTHING, STARTED TO DO ANYTHING, OR PREPARED TO DO ANYTHING TO END YOUR LIFE?: NO
2. HAVE YOU ACTUALLY HAD ANY THOUGHTS OF KILLING YOURSELF?: NO
1. IN THE PAST MONTH, HAVE YOU WISHED YOU WERE DEAD OR WISHED YOU COULD GO TO SLEEP AND NOT WAKE UP?: NO

## 2024-05-29 ASSESSMENT — PAIN SCALES - GENERAL
PAINLEVEL_OUTOF10: 0 - NO PAIN

## 2024-05-29 NOTE — ANESTHESIA PREPROCEDURE EVALUATION
Patient: Elizabeth Toledo    Procedure Information       Date/Time: 05/29/24 1030    Scheduled providers: Yiar Pedro MD; Jose Alejandre MD; JENNIFER Self    Procedure: EGD    Location: [facility]            Relevant Problems   Cardiac   (+) Hyperlipidemia   (+) Rogelio syndrome (HHS-HCC)      Liver   (+) Adenoma of liver   (+) Other cirrhosis of liver (Multi)      Endocrine   (+) Hypothyroidism       Clinical information reviewed:   Tobacco  Allergies  Meds   Med Hx  Surg Hx  OB Status  Fam Hx  Soc   Hx         Past Medical History:   Diagnosis Date    Cirrhosis (Multi)     Disruptive mood dysregulation disorder (Multi)     Hyperlipidemia     Hypothyroidism     Rogelio syndrome (HHS-HCC)       Past Surgical History:   Procedure Laterality Date    US GUIDED NEEDLE LIVER BIOPSY  8/2/2023    US GUIDED NEEDLE LIVER BIOPSY 8/2/2023 [facility]     Social History     Tobacco Use    Smoking status: Never     Passive exposure: Never    Smokeless tobacco: Never   Vaping Use    Vaping status: Never Used   Substance Use Topics    Alcohol use: Never    Drug use: Never      Current Outpatient Medications   Medication Instructions    ARIPiprazole (ABILIFY) 15 mg, oral, Daily RT    cholecalciferol (Vitamin D-3) 5,000 Units tablet 1 tablet, oral, Daily    cholecalciferol (Vitamin D-3) 50 mcg (2,000 unit) capsule oral    cholecalciferol, vitamin D3, 25 mcg (1,000 unit) tablet,chewable oral    divalproex (DEPAKOTE ER) 250 mg, oral, Daily, At night    divalproex (DEPAKOTE) 500 mg, oral    fenofibrate micronized (Lofibra) 134 mg capsule TAKE 1 CAPSULE BY MOUTH EVERY DAY    FLUoxetine (PROzac) 60 mg tablet oral    FLUoxetine (PROZAC) 60 mg, oral, Daily RT    hepatitis B virus vacc.rec,PF, (ENGERIX-B) 20 mcg/mL syringe Give a dose now and in 2 months    levothyroxine (Synthroid, Levoxyl) 25 mcg tablet TAKE 1 TABLET BY MOUTH EVERY DAY    lovastatin (Mevacor) 20 mg tablet TAKE 1 TABLET BY MOUTH EVERY DAY     "multivitamin (One Daily Multivitamin) tablet oral    zinc gluconate 50 mg tablet 50 mg of elemental zinc, oral, 2 times daily      No Known Allergies     Chemistry    Lab Results   Component Value Date/Time     05/08/2024 1311    K 4.0 05/08/2024 1311     05/08/2024 1311    CO2 27 05/08/2024 1311    BUN 10 05/08/2024 1311    CREATININE 0.64 05/08/2024 1311    Lab Results   Component Value Date/Time    CALCIUM 9.6 05/08/2024 1311    ALKPHOS 84 05/08/2024 1311    AST 89 (H) 05/08/2024 1311    ALT 55 (H) 05/08/2024 1311    BILITOT 0.7 05/08/2024 1311          No results found for: \"HGBA1C\"  Lab Results   Component Value Date/Time    WBC 7.4 05/08/2024 1311    HGB 13.5 05/08/2024 1311    HCT 41.0 05/08/2024 1311     05/08/2024 1311     Lab Results   Component Value Date/Time    PROTIME 10.5 05/08/2024 1311    INR 0.9 05/08/2024 1311     No results found for: \"ABORH\"  No results found for this or any previous visit (from the past 4464 hour(s)).  No results found for this or any previous visit from the past 1095 days.       Visit Vitals  /81 (BP Location: Right arm, Patient Position: Sitting)   Pulse 97   Temp 36.6 °C (97.9 °F) (Temporal)   Resp 17   Ht 1.422 m (4' 8\")   Wt 53.8 kg (118 lb 9.7 oz)   SpO2 96%   BMI 26.59 kg/m²   Smoking Status Never   BSA 1.46 m²     NPO/Void Status  Date of Last Liquid: 05/28/24  Time of Last Liquid: 2100  Date of Last Solid: 05/28/24  Time of Last Solid: 2100  Last Intake Type: Clear fluids         Physical Exam    Airway  Mallampati: III  TM distance: >3 FB  Neck ROM: full     Cardiovascular   Rhythm: regular  Rate: normal     Dental - normal exam     Pulmonary   Breath sounds clear to auscultation     Abdominal - normal exam              Anesthesia Plan    History of general anesthesia?: yes  History of complications of general anesthesia?: no    ASA 3     MAC   (With standard ASA monitoring.)  intravenous induction   Anesthetic plan and risks discussed with " patient.    Plan discussed with CRNA and CAA.

## 2024-05-29 NOTE — DISCHARGE INSTRUCTIONS

## 2024-05-29 NOTE — ANESTHESIA POSTPROCEDURE EVALUATION
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    Anesthesia Post Evaluation    Patient location during evaluation: PACU  Patient participation: complete - patient participated  Level of consciousness: awake and alert  Pain management: adequate  Airway patency: patent  Cardiovascular status: acceptable and hemodynamically stable  Respiratory status: acceptable, spontaneous ventilation and nonlabored ventilation  Hydration status: acceptable  Postoperative Nausea and Vomiting: none        There were no known notable events for this encounter.

## 2024-05-29 NOTE — H&P
"History Of Present Illness  Elizabeth Toledo is a 48 y.o. female presenting with cirrhosis.     Past Medical History  Past Medical History:   Diagnosis Date    Cirrhosis (Multi)     Disruptive mood dysregulation disorder (Multi)     Hyperlipidemia     Hypothyroidism     Rogelio syndrome (HHS-HCC)      Surgical History  Past Surgical History:   Procedure Laterality Date    US GUIDED NEEDLE LIVER BIOPSY  8/2/2023    US GUIDED NEEDLE LIVER BIOPSY 8/2/2023 [facility]     Social History  She reports that she has never smoked. She has never been exposed to tobacco smoke. She has never used smokeless tobacco. She reports that she does not drink alcohol and does not use drugs.    Family History  No family history on file.     Allergies  No Known Allergies  Review of Systems     Physical Exam  Constitutional:       Appearance: Normal appearance.   Cardiovascular:      Rate and Rhythm: Normal rate.   Pulmonary:      Effort: Pulmonary effort is normal.      Breath sounds: Normal breath sounds.   Abdominal:      General: Bowel sounds are normal.      Palpations: Abdomen is soft.      Tenderness: There is no abdominal tenderness.   Neurological:      Mental Status: She is alert.          Last Recorded Vitals  Blood pressure 153/81, pulse 97, temperature 36.6 °C (97.9 °F), temperature source Temporal, resp. rate 17, height 1.422 m (4' 8\"), weight 53.8 kg (118 lb 9.7 oz), SpO2 96%.    Assessment/Plan   R/O varices for EGD     Yair Pedro MD  "

## 2024-05-30 ASSESSMENT — PAIN SCALES - GENERAL: PAINLEVEL_OUTOF10: 0 - NO PAIN

## 2024-05-30 NOTE — ADDENDUM NOTE
Encounter addended by: Brie Patiño RN on: 5/30/2024 10:11 AM   Actions taken: Contacts section saved, Flowsheet accepted

## 2024-06-07 LAB
LABORATORY COMMENT REPORT: NORMAL
PATH REPORT.FINAL DX SPEC: NORMAL
PATH REPORT.GROSS SPEC: NORMAL
PATH REPORT.RELEVANT HX SPEC: NORMAL
PATH REPORT.TOTAL CANCER: NORMAL

## 2024-06-11 DIAGNOSIS — E78.5 HYPERLIPIDEMIA, UNSPECIFIED: ICD-10-CM

## 2024-06-11 RX ORDER — LOVASTATIN 20 MG/1
TABLET ORAL
Qty: 90 TABLET | Refills: 1 | Status: SHIPPED | OUTPATIENT
Start: 2024-06-11

## 2024-07-31 ENCOUNTER — HOSPITAL ENCOUNTER (OUTPATIENT)
Dept: RADIOLOGY | Facility: CLINIC | Age: 49
Discharge: HOME | End: 2024-07-31
Payer: MEDICARE

## 2024-07-31 DIAGNOSIS — K74.60 HEPATIC CIRRHOSIS, UNSPECIFIED HEPATIC CIRRHOSIS TYPE, UNSPECIFIED WHETHER ASCITES PRESENT (MULTI): ICD-10-CM

## 2024-07-31 PROCEDURE — 76705 ECHO EXAM OF ABDOMEN: CPT | Performed by: STUDENT IN AN ORGANIZED HEALTH CARE EDUCATION/TRAINING PROGRAM

## 2024-07-31 PROCEDURE — 76705 ECHO EXAM OF ABDOMEN: CPT

## 2024-08-05 DIAGNOSIS — E78.5 HYPERLIPIDEMIA, UNSPECIFIED: ICD-10-CM

## 2024-08-06 RX ORDER — FENOFIBRATE 134 MG/1
CAPSULE ORAL
Qty: 90 CAPSULE | Refills: 1 | Status: SHIPPED | OUTPATIENT
Start: 2024-08-06

## 2024-08-07 ENCOUNTER — LAB (OUTPATIENT)
Dept: LAB | Facility: LAB | Age: 49
End: 2024-08-07
Payer: MEDICARE

## 2024-08-07 DIAGNOSIS — K74.60 HEPATIC CIRRHOSIS, UNSPECIFIED HEPATIC CIRRHOSIS TYPE, UNSPECIFIED WHETHER ASCITES PRESENT (MULTI): ICD-10-CM

## 2024-08-07 DIAGNOSIS — Z13.228 ENCOUNTER FOR SCREENING FOR OTHER METABOLIC DISORDERS: Primary | ICD-10-CM

## 2024-08-07 PROCEDURE — 80053 COMPREHEN METABOLIC PANEL: CPT

## 2024-08-07 PROCEDURE — 85610 PROTHROMBIN TIME: CPT

## 2024-08-07 PROCEDURE — 85025 COMPLETE CBC W/AUTO DIFF WBC: CPT

## 2024-08-07 PROCEDURE — 82248 BILIRUBIN DIRECT: CPT

## 2024-08-07 PROCEDURE — 83036 HEMOGLOBIN GLYCOSYLATED A1C: CPT

## 2024-08-07 PROCEDURE — 82105 ALPHA-FETOPROTEIN SERUM: CPT

## 2024-08-07 PROCEDURE — 36415 COLL VENOUS BLD VENIPUNCTURE: CPT

## 2024-08-08 LAB
AFP SERPL-MCNC: <4 NG/ML (ref 0–9)
ALBUMIN SERPL BCP-MCNC: 4.3 G/DL (ref 3.4–5)
ALP SERPL-CCNC: 112 U/L (ref 33–110)
ALT SERPL W P-5'-P-CCNC: 54 U/L (ref 7–45)
ANION GAP SERPL CALC-SCNC: 16 MMOL/L (ref 10–20)
AST SERPL W P-5'-P-CCNC: 93 U/L (ref 9–39)
BASOPHILS # BLD AUTO: 0.1 X10*3/UL (ref 0–0.1)
BASOPHILS NFR BLD AUTO: 1.4 %
BILIRUB DIRECT SERPL-MCNC: 0.3 MG/DL (ref 0–0.3)
BILIRUB SERPL-MCNC: 1 MG/DL (ref 0–1.2)
BUN SERPL-MCNC: 11 MG/DL (ref 6–23)
CALCIUM SERPL-MCNC: 10.3 MG/DL (ref 8.6–10.6)
CHLORIDE SERPL-SCNC: 106 MMOL/L (ref 98–107)
CO2 SERPL-SCNC: 25 MMOL/L (ref 21–32)
CREAT SERPL-MCNC: 0.65 MG/DL (ref 0.5–1.05)
EGFRCR SERPLBLD CKD-EPI 2021: >90 ML/MIN/1.73M*2
EOSINOPHIL # BLD AUTO: 0.09 X10*3/UL (ref 0–0.7)
EOSINOPHIL NFR BLD AUTO: 1.2 %
ERYTHROCYTE [DISTWIDTH] IN BLOOD BY AUTOMATED COUNT: 13.5 % (ref 11.5–14.5)
EST. AVERAGE GLUCOSE BLD GHB EST-MCNC: 108 MG/DL
GLUCOSE SERPL-MCNC: 88 MG/DL (ref 74–99)
HBA1C MFR BLD: 5.4 %
HCT VFR BLD AUTO: 41.3 % (ref 36–46)
HGB BLD-MCNC: 12.9 G/DL (ref 12–16)
IMM GRANULOCYTES # BLD AUTO: 0.04 X10*3/UL (ref 0–0.7)
IMM GRANULOCYTES NFR BLD AUTO: 0.5 % (ref 0–0.9)
INR PPP: 1.2 (ref 0.9–1.1)
LYMPHOCYTES # BLD AUTO: 2.01 X10*3/UL (ref 1.2–4.8)
LYMPHOCYTES NFR BLD AUTO: 27.3 %
MCH RBC QN AUTO: 30.4 PG (ref 26–34)
MCHC RBC AUTO-ENTMCNC: 31.2 G/DL (ref 32–36)
MCV RBC AUTO: 97 FL (ref 80–100)
MONOCYTES # BLD AUTO: 0.4 X10*3/UL (ref 0.1–1)
MONOCYTES NFR BLD AUTO: 5.4 %
NEUTROPHILS # BLD AUTO: 4.71 X10*3/UL (ref 1.2–7.7)
NEUTROPHILS NFR BLD AUTO: 64.2 %
NRBC BLD-RTO: 0 /100 WBCS (ref 0–0)
PLATELET # BLD AUTO: 271 X10*3/UL (ref 150–450)
POTASSIUM SERPL-SCNC: 4 MMOL/L (ref 3.5–5.3)
PROT SERPL-MCNC: 8 G/DL (ref 6.4–8.2)
PROTHROMBIN TIME: 13.2 SECONDS (ref 9.8–12.8)
RBC # BLD AUTO: 4.24 X10*6/UL (ref 4–5.2)
SODIUM SERPL-SCNC: 143 MMOL/L (ref 136–145)
WBC # BLD AUTO: 7.4 X10*3/UL (ref 4.4–11.3)

## 2024-08-28 ENCOUNTER — APPOINTMENT (OUTPATIENT)
Dept: GASTROENTEROLOGY | Facility: CLINIC | Age: 49
End: 2024-08-28
Payer: MEDICARE

## 2024-09-12 ENCOUNTER — TELEPHONE (OUTPATIENT)
Dept: PRIMARY CARE | Facility: CLINIC | Age: 49
End: 2024-09-12
Payer: MEDICARE

## 2024-09-12 NOTE — TELEPHONE ENCOUNTER
Patient's mother calling to report that patient has been complaining of pain and there has been seeing noticeable swelling in her right leg. Mother was requesting if US could be done, appointment is scheduled for patient 9/18 @ 1:45 since last appointment was in February of 2024.

## 2024-09-18 ENCOUNTER — OFFICE VISIT (OUTPATIENT)
Dept: GASTROENTEROLOGY | Facility: CLINIC | Age: 49
End: 2024-09-18
Payer: MEDICARE

## 2024-09-18 ENCOUNTER — APPOINTMENT (OUTPATIENT)
Dept: PRIMARY CARE | Facility: CLINIC | Age: 49
End: 2024-09-18
Payer: MEDICARE

## 2024-09-18 VITALS
DIASTOLIC BLOOD PRESSURE: 79 MMHG | TEMPERATURE: 97.5 F | BODY MASS INDEX: 28.02 KG/M2 | SYSTOLIC BLOOD PRESSURE: 127 MMHG | HEART RATE: 95 BPM | WEIGHT: 125 LBS | OXYGEN SATURATION: 100 %

## 2024-09-18 VITALS
SYSTOLIC BLOOD PRESSURE: 136 MMHG | DIASTOLIC BLOOD PRESSURE: 86 MMHG | BODY MASS INDEX: 28.12 KG/M2 | TEMPERATURE: 97.7 F | HEIGHT: 56 IN | WEIGHT: 125 LBS | HEART RATE: 90 BPM

## 2024-09-18 DIAGNOSIS — K74.60 HEPATIC CIRRHOSIS, UNSPECIFIED HEPATIC CIRRHOSIS TYPE, UNSPECIFIED WHETHER ASCITES PRESENT (MULTI): Primary | ICD-10-CM

## 2024-09-18 DIAGNOSIS — K74.60 ESOPHAGEAL VARICES IN CIRRHOSIS (MULTI): ICD-10-CM

## 2024-09-18 DIAGNOSIS — M79.604 RIGHT LEG PAIN: ICD-10-CM

## 2024-09-18 DIAGNOSIS — M79.89 RIGHT LEG SWELLING: Primary | ICD-10-CM

## 2024-09-18 DIAGNOSIS — I85.10 ESOPHAGEAL VARICES IN CIRRHOSIS (MULTI): ICD-10-CM

## 2024-09-18 PROCEDURE — 3008F BODY MASS INDEX DOCD: CPT | Performed by: INTERNAL MEDICINE

## 2024-09-18 PROCEDURE — 1036F TOBACCO NON-USER: CPT | Performed by: INTERNAL MEDICINE

## 2024-09-18 PROCEDURE — 99213 OFFICE O/P EST LOW 20 MIN: CPT | Performed by: PEDIATRICS

## 2024-09-18 PROCEDURE — 99214 OFFICE O/P EST MOD 30 MIN: CPT | Performed by: INTERNAL MEDICINE

## 2024-09-18 PROCEDURE — 1036F TOBACCO NON-USER: CPT | Performed by: PEDIATRICS

## 2024-09-18 NOTE — PROGRESS NOTES
Subjective   Patient ID: Elizabeth Toledo is a 49 y.o. female who presents for pain in right thigh    HPI   Patient has pain in back of right thigh for a few weeks; has had chronic swelling in the right leg for 10 years; She saw a vascular specialist in May who diagnosed her with lymphedema and chronic venous insufficiency; She was advised to use compression socks but has not done that as yet.   Denies falls or trauma to the right leg;   Climbs up and down to get into van  Review of Systems    Objective   /79   Pulse 95   Temp 36.4 °C (97.5 °F)   Wt 56.7 kg (125 lb)   SpO2 100%   BMI 28.02 kg/m²     Physical Exam  Right leg lymphedema noted; no swelling; not tender to palpation    Assessment/Plan   Problem List Items Addressed This Visit             ICD-10-CM    Right leg pain M79.604     Other Visit Diagnoses         Codes    Right leg swelling    -  Primary M79.89    Relevant Orders    Lower extremity venous duplex right

## 2024-09-18 NOTE — PATIENT INSTRUCTIONS
Work on diet, weight loss and exercise.    Get labs in 3 months and in 6 months.    Get an ultrasound of the liver in 6 months.    See me back in clinic in about 7 months.

## 2024-09-18 NOTE — PROGRESS NOTES
"HEPATOLOGY RETURN PATIENT VISIT    September 18, 2024    Dr. Gretchen Hwang       Patient Name:   ELIZABETH JOHNSTON  Medical Record Number: [id number]  YOB: 1975    Dear Dr. Hwang,    I had the pleasure of seeing Elizabeth Johnston (along with her parents) for follow-up evaluation in the [facility] ([facility] office). History and physical examination was performed. Pertinent available laboratory, imaging, pathology results were reviewed.      Most of history is obtained from her parents because the patient has developmental delay.    History of Present Illness:   The patient is a 49 year old white female who is referred for evaluation of elevated liver function tests and hepatic adenomas.    The patient has had elevated LFTs for many years.  She was then referred to me for this problem several years later.    She had been admitted to the hospital in February 2015 with abdominal pain.  Imaging studies suggested a hepatic adenoma with some subcapsular hemorrhage.  There was no evidence of hemodynamic instability.  She was seen by the Chief of Hepatobiliary Surgery.  The recommendation was just to follow this with an intermittent imaging studies.  She never required surgery or ablation.    Even though her LFTs had been elevated for many years, I did not see a complete work-up done in the past other than some basic hepatitis serologies.  In addition, I did not see any imaging studies in many years.    In addition to her liver disease, she does have psychiatric issues and is on multiple psychiatric medications as well as valproic acid and a statin.  She has been on her psychiatric medications for many years.  Her mother describes them as a \"godsend” and reports that they are not sure what they would do if they had to be stopped.    They were under the impression that she did not need to get additional imaging studies of the liver.  She has not had an ultrasound since July 2017.  She " "has been off of birth control pills since 2015.    The patient denied any past history of acute hepatitis or jaundice.      She has never had any manifestations of liver disease including no jaundice, ascites, hepatic encephalopathy, or variceal bleeding. She denied ever having a liver biopsy.    She initially saw me in consultation on 5/24/2023.  At that time, I did additional evaluation (see results below).  After that, she had a FibroScan test that was very technically difficult and not likely reliable and/or accurate.  For that reason, I had her undergo a liver biopsy.  This was done without complications on 8/2/2023.  This did confirm cirrhosis, but did not give an absolute etiology.    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  Otherwise, we could not prove Tin's disease.  She fell into the \"problematic” category of diagnosing her based on the latest guidelines.  For that reason, I started her on zinc gluconate on the off chance she could have Tin disease.  We also recommended that she work on JACKSON risk factors.    She presented today for follow-up evaluation.  She denied any specific liver-related complaints.  She is tolerating the zinc gluconate 50 mg once daily well.    Past Medical/Surgical History:   ? Hyperlipidemia (272.4) (E78.5)  ? Added by Problem List Migration; 2013-6-4; Moved to Suppressed Nov 27 2013 9:11PM  ? Hepatic adenoma (211.5) (D13.4)  ? Hypothyroidism (244.9) (E03.9)  ? Elevated platelet count (790.6) (R79.89)  ? Mild vitamin D deficiency (268.9) (E55.9)  ? 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 Tremor (781.0) (R25.1)  ? Overweight with body mass index (BMI) of 27 to 27.9 in adult (278.02,V85.23)  (E66.3,Z68.27)  ? Mood swings (799.24) (R45.86)  ? Benign neoplasm of liver (211.5) (D13.4)  ? Colon cancer screening (V76.51) (Z12.11)  ? Elevated platelet count (790.6) (R79.89)  ? Encounter for immunization (V03.89) (Z23)  ? Hepatic adenoma (211.5) " (D13.4)  ? Hyperlipidemia (272.4) (E78.5)  ? Added by Problem List Migration; 2013-6-4; Moved to Suppressed Nov 27 2013 9:11PM  ? Hypothyroidism (244.9) (E03.9)  ? Lymphedema (457.1) (I89.0)  ? Medication-induced postural tremor (333.1,E980.5) (G25.1)  ? Mild vitamin D deficiency (268.9) (E55.9)  ? Mood disorder (296.90) (F39)  ? Mood swings (799.24) (R45.86)  ? Skin lesion (709.9) (L98.9)  ? Tremor (781.0) (R25.1)  ? 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 History of abnormal mammogram (V15.89) (Z87.898)  ? History of edema (V13.89) (Z87.898)  ? Resolved Date: 31 Jan 2023  ? Added by Problem List Migration; 2013-8-27; Moved to Suppressed Nov 27 2013 9:11PM  COVID    Family History:   Mother  ? Family history of diabetes mellitus (V18.0) (Z83.3)  ? Family history of hyperlipidemia (V18.19) (Z83.438)  ? Family history of hypertension (V17.49) (Z82.49)  Father  ? Family history of hyperlipidemia (V18.19) (Z83.438)  There is no known family history of colon cancer.  There is no known family history of liver disease.    Social History:   She denied tobacco use.  She denied alcohol use.  She denied tattoos.  She denied intravenous drug use.  She denied blood transfusions.  She is attending a daily adult  center and is quite happy and is making friends.    Review of systems: As noted above. She has been having some pain and swelling in her right thigh. She saw her PCP who ordered a Doppler US. No fever, chills, weight loss, visual changes, auditory changes, shortness of breath, chest pain, abdominal pain, GI bleeding, diarrhea, constipation, depression, dysuria, hematuria, musculoskeletal issues, or rash.       Medical, Surgical, Family, and Social Histories  Past Medical History:   Diagnosis Date    Cirrhosis (Multi)     Disruptive mood dysregulation disorder (Multi)     Hyperlipidemia     Hypothyroidism     Rogelio syndrome (HHS-HCC)        Past Surgical History:   Procedure Laterality Date    US GUIDED NEEDLE  LIVER BIOPSY  2023     GUIDED NEEDLE LIVER BIOPSY 2023 [facility]       No family history on file.    Social History     Socioeconomic History    Marital status: Single     Spouse name: Not on file    Number of children: Not on file    Years of education: Not on file    Highest education level: Not on file   Occupational History    Not on file   Tobacco Use    Smoking status: Never     Passive exposure: Never    Smokeless tobacco: Never   Vaping Use    Vaping status: Never Used   Substance and Sexual Activity    Alcohol use: Never    Drug use: Never    Sexual activity: Not on file   Other Topics Concern    Not on file   Social History Narrative    Not on file     Social Determinants of Health     Financial Resource Strain: Not on File (2021)    Received from [facility]    Financial Resource Strain     Financial Resource Strain: 0   Food Insecurity: Not on file (2024)   Transportation Needs: Not on File (2021)    Received from [facility]    Transportation Needs     Transportation: 0   Physical Activity: Not on File (2021)    Received from [facility]    Physical Activity     Physical Activity: 0   Stress: Not on File (2021)    Received from [facility]    Stress     Stress: 0   Social Connections: Not on File (2024)    Received from [facility]    Social Connections     Connectedness: 0   Intimate Partner Violence: Not on file   Housing Stability: Not on File (2021)    Received from [facility]    Housing Stability     Housin       Allergies and Current Medications  No Known Allergies  Current Outpatient Medications   Medication Sig Dispense Refill    ARIPiprazole (Abilify) 15 mg tablet Take 1 tablet (15 mg) by mouth once daily.      cholecalciferol (Vitamin D-3) 5,000 Units tablet Take 1 tablet (5,000 Units) by mouth once daily.      divalproex (Depakote ER) 250 mg 24 hr tablet Take 1 tablet (250 mg) by mouth once daily. At night      fenofibrate micronized (Lofibra) 134  "mg capsule TAKE 1 CAPSULE BY MOUTH EVERY DAY 90 capsule 1    FLUoxetine (PROzac) 20 mg capsule Take 3 capsules (60 mg) by mouth once daily.      FLUoxetine (PROzac) 60 mg tablet Take by mouth.      levothyroxine (Synthroid, Levoxyl) 25 mcg tablet TAKE 1 TABLET BY MOUTH EVERY DAY 90 tablet 3    lovastatin (Mevacor) 20 mg tablet TAKE 1 TABLET BY MOUTH EVERY DAY 90 tablet 1    multivitamin (One Daily Multivitamin) tablet Take by mouth.      zinc gluconate 50 mg tablet Take 1 tablet (50 mg of elemental zinc) by mouth 2 times a day. 60 tablet 11    cholecalciferol (Vitamin D-3) 50 mcg (2,000 unit) capsule Take by mouth.      cholecalciferol, vitamin D3, 25 mcg (1,000 unit) tablet,chewable Chew.      divalproex (Depakote) 500 mg EC tablet Take 1 tablet (500 mg) by mouth.      hepatitis B virus vacc.rec,PF, (ENGERIX-B) 20 mcg/mL syringe Give a dose now and in 2 months 1 mL 2     No current facility-administered medications for this visit.        Physical Exam  /86   Pulse 90   Temp 36.5 °C (97.7 °F)   Ht 1.422 m (4' 8\")   Wt 56.7 kg (125 lb)   BMI 28.02 kg/m²       Physical Examination:   General Appearance: alert and in no acute distress.  She is quite short in stature.  HEENT: oropharynx without lesions. Anicteric sclerae.   Neck supple, nontender, without adenopathy, thyromegaly, or JVD.   Lungs clear to auscultation and percussion.   Heart RRR without murmurs, rubs, or gallops.   Abdomen: Soft, nontender, bowel sounds positive, without obvious ascites.  There is a fullness in the right upper abdomen but I cannot detect an exact liver edge.  There is no detectable splenomegaly.  She is somewhat overweight centrally.  The exam is difficult because she is very ticklish.  Extremities full ROM, no atrophy, normal strength. Minimal tenderness and swelling in the right thigh, but not the right calf. No LLE edema.   Skin no specific lesions.   Neurological exam reveals that she is alert and awake.  She does have a " tremor bilaterally.  No obvious asterixis.  No spider angiomata, but she does have bilateral palmar erythema.     Labs 8/7/2024 hemoglobin A1c 5.4%, INR 1.2, WBC 7.4, hemoglobin 12.9, platelets 271, protein 8, albumin 4.3, alk phos 112, bilirubin 1, AST 93, ALT 54, glucose 88, sodium 143, creatinine 0.65, AFP < 4.    Labs 2/26/2024 cholesterol 195, , triglycerides 136.    Labs 1/17/2024 INR 1.1, AFP < 4, WBC 8.8, hemoglobin 13.3, platelets 333, protein 7.9, albumin 4.6, alk phos 60, bilirubin 0.6, , ALT 63, glucose 95, sodium 141, creatinine 0.69.    Labs 12/16/2023 COVID PCR positive.    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    Labs 9/7/2023 ceruloplasmin 31.    Labs 7/26/2023 WBC 8.1, hemoglobin 13.4, platelets 342, glucose 101, sodium 143, creatinine 0.72, protein 7.7, albumin 4.5, alk phos 50, bilirubin 0.7, , ALT 74, INR 1.1.    Labs 6/8/2023 AFP < 4, HAV +, HBsAg -, HBsAb -, HCV -, ferritin 108, iron sat 15%, PAKO -, AMA -, SMA 40, A1AT phenotype MM.    Labs 2/3/2023 TSH 2.08, vitamin D was 26, glucose 77, sodium 142, creatinine 0.68, protein 7.8, albumin 4.8, alk phos 46, bilirubin 0.6, , ALT 82, cholesterol 192, , HDL 59, triglycerides 133, WBC 8.5, hemoglobin 13.6, platelets 390.    Labs 3/9/2022 AST 54, ALT 40.    Labs 1/26/2021 AST 70, ALT 61.    Labs 7/20/2018 alk phos 31, AST 40, ALT 37.    Labs 3/14/2015 ALT 78.    Labs 2/27/2015 ferritin 201, iron saturation 6%, hepatitis B surface antigen negative, hepatitis C antibody negative, AFP < 1.    Labs 2/25/2015 .    Labs 1/19/2015 ALT 44.    Labs 1/18/2007 alk phos 39, bilirubin 2.1, AST 43, ALT 28.    US 7/31/2024:  IMPRESSION:  Hepatomegaly and right hepatic lobe echogenic subcentimeter lesions,  likely represent adenomas, seen on the MRI of the liver from 2016.      Ultrasound 1/10/2024:  IMPRESSION:  Mild hepatomegaly.      Redemonstration of  scattered small hypoechoic lesions, nonspecific,  however statistically likely representing hemangiomas. Hepatic mass  protocol MRI may be obtained for further assessment.      US 6/7/2023:  IMPRESSION:  Hepatomegaly.  Multiple small echogenic liver lesions favored to represent  hemangiomas or adenomas but incompletely characterized sonographically    Ultrasound 7/6/2017:  IMPRESSION:  Multiple scattered nonshadowing echogenic liver lesions as described.  The largest lesion is slightly larger today. Otherwise, these are  grossly stable.  Remainder of the exam was negative.    MRI 2/19/2016:  IMPRESSION:  1. Interval decrease in size of segment 7 lesion now measuring 1.9 x  2.1 x 2.3 cm versus 2.1 x 2.4 x 2.3 cm on prior exam, likely adenoma  complicated by prior hemorrhage.  2. Stable segment 7 probable hemangioma.  3. Stable redemonstration of hepatic segment 4A and 6 sub cm lesions  which, while evaluation is limited due to motion degradation of early  postcontrast images, may represent slow filling hemangiomas or  adenomas.  4. No evidence of new hepatic lesions.    CAT scan with contrast 1/19/2015:  Impression:  1. Limited examination due to motion artifact.  2. No obvious acute abdominal or pelvic process.  3. Heterogeneous mass in the right lobe of the liver measuring 4.1 x  4.9 cm. This could represent a hepatic adenoma or FNH, however,  malignant hepatic masses are not excluded. Further evaluation with  liver MRI is recommended. This may be performed on a nonemergent  basis.  4. Hypodensity in the pancreatic body likely represents prominent fat  between pancreatic lobules. However, the appearance is somewhat  unusual due to motion artifact. Attention to this area on followup is  recommended.    EGD 5/29/2024:  Impression  The esophagus appeared normal.  Abnormal mucosa, consistent with gastritis in the antrum; performed cold forceps biopsy  The fundus of the stomach and body of the stomach appeared  normal.  The duodenum appeared normal.      Liver biopsy 8/2/2023:  FINAL DIAGNOSIS  A.  RANDOM LIVER BIOPSY:    -- LIVER PARENCHYMA WITH CIRRHOSIS    Note: The liver tissue is nodular with bridging and circumferential fibrosis.  The portal tracts are mildly expanded with mostly lymphocytes.  No significant  bile duct damage or interface hepatitis is seen.  The hepatocytes demonstrate  Mirella Denk bodies and feathery degeneration with rare acidophil bodies.  No  cholestasis is seen.      Special stains:  Iron               negative  PAS with digestion     no cytoplasmic eosinophilic globules  Trichrome          bridging fibrosis and circumferential fibrosis  Reticulin          no collapsed liver parenchyma  Copper               negative       FibroScan 7/12/2023 revealed a median liver stiffness of 33.7 kPa with IQR 33% and .        Assessment/Plan:     Elevated liver function tests  History of hepatic adenoma  Cirrhosis    The patient is referred to me for follow-up evaluation of the above issues.    As noted above, the patient has had elevated LFTs for many years.  Other than imaging studies and hepatitis serologies, I see no particular work-up having been done for this in the past.  For that reason, I did a full serologic work-up.  Her serologic work-up for other causes of liver disease was essentially unrevealing.    In addition, she is on medications which can affect the liver such as valproic acid, Abilify, and Prozac.  Depending on our work-up, her other providers may need to decide whether some of her medicine should be stopped or switched to see if this helps the LFTs.  Unfortunately, her mother admits that it would be very difficult to consider stopping any of her psychiatric medications.    With her history of psychiatric issues and liver disease and a low alkaline phosphatase, we would need to consider Tin's disease in our differential.  Her ceruloplasmin was normal.  She was not able to  cooperate with a 24-hour urine copper test.  She did see her eye doctor on 5/24/2023 and her mother told us that they saw no evidence of Wen Fleischer rings.  The genetic test showed one abnormality but was not diagnostic for Tin's disease.    Given the unreliable FibroScan as well as concern for Tin's disease, we had her undergo a liver biopsy.  This was done in August 2023 and confirmed cirrhosis.  Unfortunately, they could not determine an etiology.  Of note, the copper stain was negative.    Again, even though Tin disease seems somewhat unlikely, I cannot completely rule it out.  For that reason, I am comfortable just leaving her on the elemental zinc.  She is tolerating the low-dose of 50 mg once daily.  I would just keep her on that dose.    She is immune to hepatitis A.    She was not immune to hepatitis B.  Her mother believes that they completed the vaccine series.  I will check for immunity with her next labs.    With a history of an adenoma, these can enlarge and can even undergo malignant transformation.  We had her undergo an ultrasound that showed that these were relatively stable.  She should remain off of her birth control pills.  Now that we know that she has cirrhosis, she should get AFP and ultrasound every 6 months anyway.    With her cirrhosis, and given the high liver stiffness on FibroScan, she underwent an EGD in May 2024.  This was done with anesthesia assistance.  Luckily, there were no varices seen.  We could consider repeating this around May 2026.    Her parents do not feel that she would be able to do a colonoscopy preparation.  She has actually had a Cologuard in the past that was reportedly negative and they will pass on attempting colonoscopy.    She can get labs every 3 months, AFP and ultrasound every 6 months, and see me back in about 7 months.    Thank you for allowing me to participate in the care of this patient. Please feel free to contact me with any questions  regarding their care.     Sincerely,     Tyrese Vann MD, FAASLD, FACG.   Medical Director, Hepatology  Senior Attending Physician  [facility]  [facility]  Professor of Medicine  Division of Gastroenterology and Liver Disease  [employer]  Medicine  [address] [place] [id number]  Phone: [phone]  Fax: [phone].      This document was generated with a computerized dictation system.  Because of this, there could be errors in grammar and/or content.

## 2024-09-18 NOTE — PATIENT INSTRUCTIONS
Go to [facility] [address]  in [facility] at 2 pm.  Medicare wellness visit due after February 7.

## 2024-09-19 ENCOUNTER — HOSPITAL ENCOUNTER (OUTPATIENT)
Dept: RADIOLOGY | Facility: CLINIC | Age: 49
Discharge: HOME | End: 2024-09-19
Payer: MEDICARE

## 2024-09-19 DIAGNOSIS — M79.89 RIGHT LEG SWELLING: ICD-10-CM

## 2024-09-19 PROCEDURE — 93971 EXTREMITY STUDY: CPT

## 2024-10-25 ENCOUNTER — TELEPHONE (OUTPATIENT)
Dept: PRIMARY CARE | Facility: CLINIC | Age: 49
End: 2024-10-25
Payer: MEDICARE

## 2024-10-25 DIAGNOSIS — E78.5 HYPERLIPIDEMIA, UNSPECIFIED HYPERLIPIDEMIA TYPE: Primary | ICD-10-CM

## 2024-10-25 NOTE — TELEPHONE ENCOUNTER
I relayed Dr. Hwang message to Pt. mother Jany : Please tell mom we'll try her off fenofibrate and recheck her lipid panel next time because it might contribute to liver test abnormalities she can take her for fasting lipid panel after 6 weeks to a  lab. Jany expressed understanding of the message given and stated that she will take Elizabeth to the lab on December 18 so that she can complete the blood lab orders for Dr. Vann at the same time as Dr. Hwang orders all at once.

## 2024-11-14 ENCOUNTER — ALLIED HEALTH (OUTPATIENT)
Dept: INTEGRATIVE MEDICINE | Facility: CLINIC | Age: 49
End: 2024-11-14

## 2024-11-14 PROCEDURE — CYTAX SALES TAX CUYAHOGA COUNT: Performed by: MASSAGE THERAPIST

## 2024-11-14 PROCEDURE — MASSG60 MASSAGE 60 MINUTES: Performed by: MASSAGE THERAPIST

## 2024-11-14 NOTE — PROGRESS NOTES
Massage Therapy Visit:     Elizabeth Toledo was self-referred.    Condition of Client Subjective :  Patient ID: Elizabeth Toledo is a 49 y.o. female who presents for reason for visit of rt leg inflammation, pain behind the rt knee.    HPI: rt leg inflammation, pain behind rt knee. Elizabeth sleeps on the right side consistently.     Elizabeth was prescribed compression stockings for lower extremity edema by her pcp.    Session Information  Description of present complaint: Muscle tension      Objective   Pre-treatment Assessment  Arrival Mode: Ambulatory  Treatment Precautions: None    Actions Assessment/Plan :  Provider reviewed plan for the massage session, precautions and contraindications. Patient/guardian/hospital staff has given consent to treat with full understanding of what to expect during the session. Before massage therapy began, provider explained to the patient to communicate at any time if the pressure was causing discomfort past their tolerance level. Patient agreed to advise therapist.    Massage Treatment  Patient Position: Table, Supine  Positioning Assistance: Did not need assistance  Massage Technique: Myofascial release  Area/Body Region: right lower extremity, posterior neck and cranium  Pressure Scale: 2 - Mild pressure    Response:     The tissue behind the knee and the rt hamstring felt most restricted to movement and softening, however, the R LE regions treated did eventually soften with myofascial release.     Evaluation:

## 2024-11-19 ENCOUNTER — TELEPHONE (OUTPATIENT)
Dept: PRIMARY CARE | Facility: CLINIC | Age: 49
End: 2024-11-19
Payer: MEDICARE

## 2024-11-20 ENCOUNTER — TELEPHONE (OUTPATIENT)
Dept: PRIMARY CARE | Facility: CLINIC | Age: 49
End: 2024-11-20
Payer: MEDICARE

## 2024-11-20 DIAGNOSIS — M25.561 CHRONIC PAIN OF RIGHT KNEE: Primary | ICD-10-CM

## 2024-11-20 DIAGNOSIS — G89.29 CHRONIC PAIN OF RIGHT KNEE: Primary | ICD-10-CM

## 2024-11-20 NOTE — TELEPHONE ENCOUNTER
Patient's mother is requesting an xray or ultrasound of patient's right knee. She stated patient has been complaining of pain for the last few months and stated it has been increasing recently. Mother did not want an appointment for patient.

## 2024-11-21 ENCOUNTER — HOSPITAL ENCOUNTER (OUTPATIENT)
Dept: RADIOLOGY | Facility: CLINIC | Age: 49
Discharge: HOME | End: 2024-11-21
Payer: MEDICARE

## 2024-11-21 DIAGNOSIS — G89.29 CHRONIC PAIN OF RIGHT KNEE: ICD-10-CM

## 2024-11-21 DIAGNOSIS — M25.561 CHRONIC PAIN OF RIGHT KNEE: ICD-10-CM

## 2024-11-21 PROCEDURE — 73560 X-RAY EXAM OF KNEE 1 OR 2: CPT | Mod: RT

## 2024-11-21 PROCEDURE — 73560 X-RAY EXAM OF KNEE 1 OR 2: CPT | Mod: RIGHT SIDE | Performed by: RADIOLOGY

## 2024-12-01 DIAGNOSIS — E78.5 HYPERLIPIDEMIA, UNSPECIFIED: ICD-10-CM

## 2024-12-02 ENCOUNTER — TELEPHONE (OUTPATIENT)
Dept: ORTHOPEDIC SURGERY | Age: 49
End: 2024-12-02

## 2024-12-02 ENCOUNTER — APPOINTMENT (OUTPATIENT)
Dept: ORTHOPEDIC SURGERY | Age: 49
End: 2024-12-02
Payer: MEDICARE

## 2024-12-02 VITALS — HEIGHT: 56 IN | WEIGHT: 125 LBS | BODY MASS INDEX: 28.12 KG/M2

## 2024-12-02 DIAGNOSIS — M17.11 LOCALIZED OSTEOARTHRITIS OF RIGHT KNEE: Primary | ICD-10-CM

## 2024-12-02 PROCEDURE — 3008F BODY MASS INDEX DOCD: CPT | Performed by: EMERGENCY MEDICINE

## 2024-12-02 PROCEDURE — 1036F TOBACCO NON-USER: CPT | Performed by: EMERGENCY MEDICINE

## 2024-12-02 PROCEDURE — 99204 OFFICE O/P NEW MOD 45 MIN: CPT | Performed by: EMERGENCY MEDICINE

## 2024-12-02 NOTE — PROGRESS NOTES
Subjective    Patient ID: Elizabeth Toledo is a 49 y.o. female.    Chief Complaint: Pain of the Right Knee (Referred by PCP Eri. Patient has had medial knee pain for 3 months, no injury. Denies any tingling or numbness. Pain with getting in and out of car. Never had injection or past surgery to area. )     Last Surgery: No surgery found  Last Surgery Date: No surgery found    Elizabeth is a pleasant 48 yo F coming in with R knee pain. Her pain has been present for the past 2-3 months. She was sent here by her PCP Dr. Hwang.  She has been having medial joint line pain in the right knee for the past couple of months that is getting worse with activity and seems to be better with rest.  Right now she states that her pain is very mild.  All of her pain is located over the medial aspect of the right knee joint.  The pain is worse when she gets in and out of the van when she goes to her day center.  She is with her parents who are helping her provide history. No weakness. No trauma or known injuries. No swelling. No mechanical symptoms. No infectious symptoms. She has been taking tylenol prn for pain with some improvement.         Objective   Right Knee Exam     Muscle Strength   The patient has normal right knee strength.      Left Knee Exam     Tenderness   The patient is experiencing tenderness in the medial joint line.    Range of Motion   The patient has normal left knee ROM.  Extension:  normal   Flexion:  normal     Tests   Phoebe:  Medial - negative Lateral - negative  Varus: negative Valgus: negative  Lachman:  Anterior - negative      Drawer:  Anterior - negative     Posterior - negative    Other   Erythema: absent  Sensation: normal  Pulse: present  Swelling: none  Effusion: no effusion present    Comments:  No swelling.             Image Results:  XR knee right 1-2 views  Interpreted By:  Yoon Stacy,   STUDY:  XR KNEE RIGHT 1-2 VIEWS;  11/21/2024 1:18 pm      INDICATION:  Signs/Symptoms:pain.       COMPARISON:  none      ACCESSION NUMBER(S):  [id number]      ORDERING CLINICIAN:  ZULEMA PERALES      FINDINGS:  Minimal osteophytosis of the medial compartment. No effusion. No  fracture or dislocation. No osseous lesion. Remaining joint spaces  preserved.      IMPRESSION  Minimal medial compartment osteoarthrosis right knee.          MACRO  none      Signed by: Yoon Stacy 11/22/2024 9:10 PM  Dictation workstation:   [id number]    X-rays of the right knee were reviewed and interpreted by me on 12/2/2024 and demonstrated some mild degenerative changes in the medial compartment without any evidence of acute injuries or fractures.    Assessment/Plan   Encounter Diagnoses:  Localized osteoarthritis of right knee    Orders Placed This Encounter    Referral to Physical Therapy     No follow-ups on file.    We had a long discussion about her treatment options and eventually agreed for her to start taking Tylenol at prescription dosing 1000 mg 3 times a day and she is also going to begin using prescription dose Voltaren topically 3 times a day.  She is going to go to physical therapy and work on developing and implementing a home exercise program and then will follow-up with me in [place] in about 6 weeks since her and her family lives in [place].  At that time if she is still having pain we could potentially consider getting additional imaging but right now my concern for surgical pathology is relatively low and she really wants to avoid surgery if at all possible.  We could also consider doing diagnostic/therapeutic injections if needed. Referred by Dr. Perales.     ** Please excuse any errors in grammar or translation related to this dictation. Voice recognition software was utilized to prepare this document. **       Ignacio Sheffield MD  [facility] Sports Medicine

## 2024-12-02 NOTE — TELEPHONE ENCOUNTER
Patients mom, Jany, called about patient's appointment 12/02/24. She would like to know if there are any facilities closer for physical therapy. Jany listed anywhere near [place] or [place]. Please advise.

## 2024-12-03 RX ORDER — LOVASTATIN 20 MG/1
TABLET ORAL
Qty: 90 TABLET | Refills: 1 | Status: SHIPPED | OUTPATIENT
Start: 2024-12-03

## 2024-12-12 ENCOUNTER — APPOINTMENT (OUTPATIENT)
Dept: INTEGRATIVE MEDICINE | Facility: CLINIC | Age: 49
End: 2024-12-12
Payer: MEDICARE

## 2024-12-16 ENCOUNTER — APPOINTMENT (OUTPATIENT)
Dept: PRIMARY CARE | Facility: CLINIC | Age: 49
End: 2024-12-16
Payer: MEDICARE

## 2024-12-16 ENCOUNTER — LAB (OUTPATIENT)
Dept: LAB | Facility: LAB | Age: 49
End: 2024-12-16
Payer: MEDICARE

## 2024-12-16 VITALS
HEART RATE: 98 BPM | SYSTOLIC BLOOD PRESSURE: 134 MMHG | DIASTOLIC BLOOD PRESSURE: 94 MMHG | OXYGEN SATURATION: 99 % | TEMPERATURE: 97 F | BODY MASS INDEX: 30.36 KG/M2 | WEIGHT: 135.4 LBS

## 2024-12-16 DIAGNOSIS — R14.0 ABDOMINAL DISTENSION: Primary | ICD-10-CM

## 2024-12-16 DIAGNOSIS — R93.2 ABNORMAL FINDINGS ON DIAGNOSTIC IMAGING OF LIVER: ICD-10-CM

## 2024-12-16 DIAGNOSIS — R03.0 ELEVATED BLOOD PRESSURE READING: ICD-10-CM

## 2024-12-16 DIAGNOSIS — R60.0 BILATERAL LEG EDEMA: ICD-10-CM

## 2024-12-16 DIAGNOSIS — G89.29 CHRONIC PAIN OF RIGHT KNEE: ICD-10-CM

## 2024-12-16 DIAGNOSIS — M25.561 CHRONIC PAIN OF RIGHT KNEE: ICD-10-CM

## 2024-12-16 DIAGNOSIS — I85.10 ESOPHAGEAL VARICES IN CIRRHOSIS (MULTI): ICD-10-CM

## 2024-12-16 DIAGNOSIS — E78.5 HYPERLIPIDEMIA, UNSPECIFIED HYPERLIPIDEMIA TYPE: ICD-10-CM

## 2024-12-16 DIAGNOSIS — K74.60 HEPATIC CIRRHOSIS, UNSPECIFIED HEPATIC CIRRHOSIS TYPE, UNSPECIFIED WHETHER ASCITES PRESENT (MULTI): ICD-10-CM

## 2024-12-16 DIAGNOSIS — K74.60 ESOPHAGEAL VARICES IN CIRRHOSIS (MULTI): ICD-10-CM

## 2024-12-16 LAB
APPEARANCE UR: CLEAR
BILIRUB UR QL STRIP: ABNORMAL
COLOR UR: ABNORMAL
GLUCOSE UR STRIP-MCNC: NEGATIVE MG/DL
HGB UR QL STRIP: NEGATIVE
KETONES UR STRIP-MCNC: ABNORMAL MG/DL
LEUKOCYTE ESTERASE UR QL STRIP: NEGATIVE
NITRITE UR QL STRIP: NEGATIVE
PH UR STRIP: 5 [PH]
PROT UR STRIP-MCNC: ABNORMAL MG/DL
SP GR UR STRIP.AUTO: 1.02
UROBILINOGEN UR STRIP-ACNC: 2 E.U./DL

## 2024-12-16 PROCEDURE — 82105 ALPHA-FETOPROTEIN SERUM: CPT

## 2024-12-16 PROCEDURE — 36415 COLL VENOUS BLD VENIPUNCTURE: CPT

## 2024-12-16 PROCEDURE — 85025 COMPLETE CBC W/AUTO DIFF WBC: CPT

## 2024-12-16 PROCEDURE — 84080 ASSAY ALKALINE PHOSPHATASES: CPT

## 2024-12-16 PROCEDURE — 80053 COMPREHEN METABOLIC PANEL: CPT

## 2024-12-16 PROCEDURE — 80061 LIPID PANEL: CPT

## 2024-12-16 PROCEDURE — 82248 BILIRUBIN DIRECT: CPT

## 2024-12-16 PROCEDURE — 99213 OFFICE O/P EST LOW 20 MIN: CPT | Performed by: PEDIATRICS

## 2024-12-16 PROCEDURE — 86706 HEP B SURFACE ANTIBODY: CPT

## 2024-12-16 PROCEDURE — 85610 PROTHROMBIN TIME: CPT

## 2024-12-16 PROCEDURE — 81003 URINALYSIS AUTO W/O SCOPE: CPT | Performed by: PEDIATRICS

## 2024-12-16 ASSESSMENT — PAIN SCALES - GENERAL: PAINLEVEL_OUTOF10: 0-NO PAIN

## 2024-12-16 NOTE — PROGRESS NOTES
Subjective   Patient ID: Elizabeth Toledo is a 49 y.o. female who presents for No chief complaint on file..    HPI     Crampy abdominal pain,comes and goes, Getting full faster over the last couple weeks, per family stomach is noticeably larger since September. History of edema to the right leg, left hand. 10 pound weight gain in the last 2 weeks. Denies constipation or diarrhea. Diagnosis of cirrhosis.     Follows with GI for liver issues.   Endoscopy in May was negative.   Stopped the fenofibrate, lab work done this morning for Gi doc.     Review of Systems    Objective   BP (!) 134/94 (BP Location: Left arm, Patient Position: Sitting, BP Cuff Size: Small adult)   Pulse 98   Temp 36.1 °C (97 °F)   Wt 61.4 kg (135 lb 6.4 oz)   SpO2 99%   BMI 30.36 kg/m²     Physical Exam  Lungs clear  Heart RRR  Abd soft distended; nontender  2 plus pitting edema bilaterally  Assessment/Plan   Problem List Items Addressed This Visit             ICD-10-CM    Chronic pain of right knee M25.561, G89.29     Improved with voltaren gel         Abdominal distension - Primary R14.0    Relevant Orders    US abdomen complete    POCT UA (Automated) docked device    Bilateral leg edema R60.0    Elevated blood pressure reading R03.0

## 2024-12-16 NOTE — PATIENT INSTRUCTIONS
Low salt diet  Monitor blood pressure at home and send me blood pressures via [employer]  Return in 3 months  Go to [place] at 9:45 AM for ultrasound; [address] [facility]

## 2024-12-17 ENCOUNTER — APPOINTMENT (OUTPATIENT)
Dept: RADIOLOGY | Facility: CLINIC | Age: 49
End: 2024-12-17
Payer: MEDICARE

## 2024-12-17 ENCOUNTER — HOSPITAL ENCOUNTER (OUTPATIENT)
Dept: RADIOLOGY | Facility: CLINIC | Age: 49
Discharge: HOME | End: 2024-12-17
Payer: MEDICARE

## 2024-12-17 ENCOUNTER — TELEPHONE (OUTPATIENT)
Dept: GASTROENTEROLOGY | Facility: CLINIC | Age: 49
End: 2024-12-17
Payer: MEDICARE

## 2024-12-17 DIAGNOSIS — E78.5 HYPERLIPIDEMIA, UNSPECIFIED HYPERLIPIDEMIA TYPE: ICD-10-CM

## 2024-12-17 DIAGNOSIS — D75.89 MACROCYTOSIS: Primary | ICD-10-CM

## 2024-12-17 DIAGNOSIS — R80.9 PROTEINURIA, UNSPECIFIED TYPE: Primary | ICD-10-CM

## 2024-12-17 DIAGNOSIS — R14.0 ABDOMINAL DISTENSION: ICD-10-CM

## 2024-12-17 DIAGNOSIS — R80.9 PROTEINURIA, UNSPECIFIED TYPE: ICD-10-CM

## 2024-12-17 DIAGNOSIS — K74.60 HEPATIC CIRRHOSIS, UNSPECIFIED HEPATIC CIRRHOSIS TYPE, UNSPECIFIED WHETHER ASCITES PRESENT (MULTI): ICD-10-CM

## 2024-12-17 LAB
ALBUMIN SERPL BCP-MCNC: 3.8 G/DL (ref 3.4–5)
ALP SERPL-CCNC: 289 U/L (ref 33–110)
ALT SERPL W P-5'-P-CCNC: 43 U/L (ref 7–45)
ANION GAP SERPL CALC-SCNC: 20 MMOL/L (ref 10–20)
AST SERPL W P-5'-P-CCNC: 84 U/L (ref 9–39)
BASOPHILS # BLD AUTO: 0.1 X10*3/UL (ref 0–0.1)
BASOPHILS NFR BLD AUTO: 1.3 %
BILIRUB DIRECT SERPL-MCNC: 0.3 MG/DL (ref 0–0.3)
BILIRUB SERPL-MCNC: 1.4 MG/DL (ref 0–1.2)
BUN SERPL-MCNC: 12 MG/DL (ref 6–23)
CALCIUM SERPL-MCNC: 9.9 MG/DL (ref 8.6–10.6)
CHLORIDE SERPL-SCNC: 101 MMOL/L (ref 98–107)
CHOLEST SERPL-MCNC: 213 MG/DL (ref 0–199)
CHOLESTEROL/HDL RATIO: 5.3
CO2 SERPL-SCNC: 27 MMOL/L (ref 21–32)
CREAT SERPL-MCNC: 0.58 MG/DL (ref 0.5–1.05)
EGFRCR SERPLBLD CKD-EPI 2021: >90 ML/MIN/1.73M*2
EOSINOPHIL # BLD AUTO: 0.12 X10*3/UL (ref 0–0.7)
EOSINOPHIL NFR BLD AUTO: 1.6 %
ERYTHROCYTE [DISTWIDTH] IN BLOOD BY AUTOMATED COUNT: 15.1 % (ref 11.5–14.5)
GLUCOSE SERPL-MCNC: 89 MG/DL (ref 74–99)
HBV SURFACE AB SER-ACNC: >1000 MIU/ML
HCT VFR BLD AUTO: 44.9 % (ref 36–46)
HDLC SERPL-MCNC: 40.4 MG/DL
HGB BLD-MCNC: 14.2 G/DL (ref 12–16)
IMM GRANULOCYTES # BLD AUTO: 0.03 X10*3/UL (ref 0–0.7)
IMM GRANULOCYTES NFR BLD AUTO: 0.4 % (ref 0–0.9)
INR PPP: 1.2 (ref 0.9–1.1)
LDLC SERPL CALC-MCNC: 141 MG/DL
LYMPHOCYTES # BLD AUTO: 2 X10*3/UL (ref 1.2–4.8)
LYMPHOCYTES NFR BLD AUTO: 25.8 %
MCH RBC QN AUTO: 32.3 PG (ref 26–34)
MCHC RBC AUTO-ENTMCNC: 31.6 G/DL (ref 32–36)
MCV RBC AUTO: 102 FL (ref 80–100)
MONOCYTES # BLD AUTO: 0.54 X10*3/UL (ref 0.1–1)
MONOCYTES NFR BLD AUTO: 7 %
NEUTROPHILS # BLD AUTO: 4.95 X10*3/UL (ref 1.2–7.7)
NEUTROPHILS NFR BLD AUTO: 63.9 %
NON HDL CHOLESTEROL: 173 MG/DL (ref 0–149)
NRBC BLD-RTO: 0 /100 WBCS (ref 0–0)
PLATELET # BLD AUTO: 348 X10*3/UL (ref 150–450)
POTASSIUM SERPL-SCNC: 3.6 MMOL/L (ref 3.5–5.3)
PROT SERPL-MCNC: 8 G/DL (ref 6.4–8.2)
PROTHROMBIN TIME: 13.8 SECONDS (ref 9.8–12.8)
RBC # BLD AUTO: 4.39 X10*6/UL (ref 4–5.2)
SODIUM SERPL-SCNC: 144 MMOL/L (ref 136–145)
TRIGL SERPL-MCNC: 157 MG/DL (ref 0–149)
VLDL: 31 MG/DL (ref 0–40)
WBC # BLD AUTO: 7.7 X10*3/UL (ref 4.4–11.3)

## 2024-12-17 PROCEDURE — 76700 US EXAM ABDOM COMPLETE: CPT

## 2024-12-17 PROCEDURE — 76700 US EXAM ABDOM COMPLETE: CPT | Performed by: RADIOLOGY

## 2024-12-17 NOTE — TELEPHONE ENCOUNTER
Spoke to patients mother she reports patient was having increased swelling of the abdomen so they took her to see her PCP. She is currently having a abd US. Will follow up on results.

## 2024-12-18 ENCOUNTER — TELEPHONE (OUTPATIENT)
Dept: GASTROENTEROLOGY | Facility: CLINIC | Age: 49
End: 2024-12-18
Payer: MEDICARE

## 2024-12-18 ENCOUNTER — TELEPHONE (OUTPATIENT)
Dept: PRIMARY CARE | Facility: CLINIC | Age: 49
End: 2024-12-18
Payer: MEDICARE

## 2024-12-18 DIAGNOSIS — K74.60 HEPATIC CIRRHOSIS, UNSPECIFIED HEPATIC CIRRHOSIS TYPE, UNSPECIFIED WHETHER ASCITES PRESENT (MULTI): ICD-10-CM

## 2024-12-18 DIAGNOSIS — R93.2 ABNORMAL FINDINGS ON DIAGNOSTIC IMAGING OF LIVER: ICD-10-CM

## 2024-12-18 DIAGNOSIS — K76.9 LIVER LESION: ICD-10-CM

## 2024-12-18 LAB
AFP SERPL-MCNC: <4 NG/ML (ref 0–9)
ALP BONE SERPL-CCNC: 87 U/L (ref 0–55)
ALP LIVER SERPL-CCNC: 260 U/L (ref 0–94)
ALP OTHER SERPL-CCNC: 0 U/L
ALP SERPL-CCNC: 347 U/L (ref 40–120)

## 2024-12-18 NOTE — TELEPHONE ENCOUNTER
----- Message from Temi GARCIA sent at 12/18/2024 11:23 AM EST -----    ----- Message -----  From: Gretchen Hwang MD  Sent: 12/17/2024   6:48 AM EST  To: Temi Kraft    There is a lot of protein in urine. I will order a 24 hour urine protein to quantify how much. She might need to see a nephrologist afterwards.

## 2024-12-18 NOTE — TELEPHONE ENCOUNTER
I notified Pt. mother Jany Toledo of Dr. Hwang message. Jany expressed understanding of the message given.   Dr. Eri Carmichael was on the phone with Dr. Vann and said he wants Elizabeth to do a MRI also. Jany would like for you to call her back so that she can discuss with you  Dr. Vann plan of care. Thanks

## 2024-12-18 NOTE — TELEPHONE ENCOUNTER
Spoke with patient's mother about abdominal US findings. She states the hepatology doctor already reached out and they want to obtain an abdominal MRI. Decided not to do 24 hour urine collection because of the patient's frequent incontinent episodes.     ----- Message from Gretchen Hwang sent at 12/18/2024  4:03 PM EST -----  She has ascites (fluid build up in abdomen) from her cirrhosis; Please ask mom to call her liver doctor. She also has some sludge in the gallbladder.

## 2024-12-19 ENCOUNTER — TELEPHONE (OUTPATIENT)
Facility: CLINIC | Age: 49
End: 2024-12-19
Payer: MEDICARE

## 2024-12-19 ENCOUNTER — HOSPITAL ENCOUNTER (OUTPATIENT)
Dept: RADIOLOGY | Facility: HOSPITAL | Age: 49
Discharge: HOME | End: 2024-12-19
Payer: MEDICARE

## 2024-12-19 VITALS — BODY MASS INDEX: 30.35 KG/M2 | WEIGHT: 135.36 LBS

## 2024-12-19 DIAGNOSIS — R93.2 ABNORMAL FINDINGS ON DIAGNOSTIC IMAGING OF LIVER: ICD-10-CM

## 2024-12-19 DIAGNOSIS — K74.60 HEPATIC CIRRHOSIS, UNSPECIFIED HEPATIC CIRRHOSIS TYPE, UNSPECIFIED WHETHER ASCITES PRESENT (MULTI): ICD-10-CM

## 2024-12-19 DIAGNOSIS — K76.9 LIVER LESION: ICD-10-CM

## 2024-12-19 DIAGNOSIS — R18.8 OTHER ASCITES: ICD-10-CM

## 2024-12-19 PROCEDURE — 74183 MRI ABD W/O CNTR FLWD CNTR: CPT

## 2024-12-19 PROCEDURE — 2550000001 HC RX 255 CONTRASTS: Performed by: INTERNAL MEDICINE

## 2024-12-19 PROCEDURE — A9575 INJ GADOTERATE MEGLUMI 0.1ML: HCPCS | Performed by: INTERNAL MEDICINE

## 2024-12-19 RX ORDER — GADOTERATE MEGLUMINE 376.9 MG/ML
12 INJECTION INTRAVENOUS
Status: COMPLETED | OUTPATIENT
Start: 2024-12-19 | End: 2024-12-19

## 2024-12-19 NOTE — TELEPHONE ENCOUNTER
----- Message from Tyrese Vann sent at 12/19/2024 11:05 AM EST -----  There is no evidence of cancer.  She needs paracentesis.  They should check for protein, albumin, cell count with differential, culture and cytology.

## 2024-12-22 NOTE — ADDENDUM NOTE
Addended by: ZULEMA PERALES on: 12/22/2024 03:47 PM     Modules accepted: Orders, Level of Service

## 2024-12-23 ENCOUNTER — TELEPHONE (OUTPATIENT)
Dept: PRIMARY CARE | Facility: CLINIC | Age: 49
End: 2024-12-23
Payer: MEDICARE

## 2024-12-23 NOTE — TELEPHONE ENCOUNTER
----- Message from Gretchen Hwang sent at 12/22/2024 11:57 PM EST -----  Cholesterol ciara quite a bit; would mom like her to see a dietician?

## 2024-12-23 NOTE — TELEPHONE ENCOUNTER
I notified Pt. mother Jany Toledo of Dr. Hwang message. Jany expressed understanding of the message given     Dr. Eri Carmichael declined for Elizabeth to see a dietician, Jany stated that she's(Elizabeth) going through too much right now. Jany request if you would start Elizabeth back on Fenofibrate, Jany stated that it works well for Elizabeth's cholesterol. If with questions Jany contact number is  [phone].

## 2024-12-26 ENCOUNTER — HOSPITAL ENCOUNTER (OUTPATIENT)
Dept: RADIOLOGY | Facility: HOSPITAL | Age: 49
Discharge: HOME | End: 2024-12-26
Payer: MEDICARE

## 2024-12-26 DIAGNOSIS — R18.8 OTHER ASCITES: ICD-10-CM

## 2024-12-26 LAB
ALBUMIN FLD-MCNC: 1.1 G/DL
BASOPHILS NFR FLD MANUAL: 0 %
BLASTS NFR FLD MANUAL: 0 %
CLARITY FLD: CLEAR
COLOR FLD: YELLOW
EOSINOPHIL NFR FLD MANUAL: 0 %
IMMATURE GRANULOCYTES IN FLUID: 0 %
LYMPHOCYTES NFR FLD MANUAL: 29 %
MONOS+MACROS NFR FLD MANUAL: 65 %
NEUTROPHILS NFR FLD MANUAL: 6 %
OTHER CELLS NFR FLD MANUAL: 0 %
PLASMA CELLS NFR FLD MANUAL: 0 %
PROT FLD-MCNC: 1.8 G/DL
RBC # FLD AUTO: 11 /UL
TOTAL CELLS COUNTED FLD: 100
WBC # FLD MANUAL: 61 /UL

## 2024-12-26 PROCEDURE — 84157 ASSAY OF PROTEIN OTHER: CPT | Mod: PORLAB | Performed by: INTERNAL MEDICINE

## 2024-12-26 PROCEDURE — 82042 OTHER SOURCE ALBUMIN QUAN EA: CPT | Mod: PORLAB | Performed by: INTERNAL MEDICINE

## 2024-12-26 PROCEDURE — 89051 BODY FLUID CELL COUNT: CPT | Performed by: INTERNAL MEDICINE

## 2024-12-26 PROCEDURE — 2720000007 HC OR 272 NO HCPCS

## 2024-12-26 PROCEDURE — 49083 ABD PARACENTESIS W/IMAGING: CPT

## 2024-12-26 PROCEDURE — 87070 CULTURE OTHR SPECIMN AEROBIC: CPT | Mod: PORLAB | Performed by: INTERNAL MEDICINE

## 2024-12-26 PROCEDURE — C1729 CATH, DRAINAGE: HCPCS

## 2024-12-26 NOTE — POST-PROCEDURE NOTE
Interventional Radiology Brief Postprocedure Note    Attending: Adan Dawkins MD      Assistant: none    Diagnosis: ascites    Description of procedure: paracentesis     Anesthesia:  Local    Complications: None    Estimated Blood Loss: none      specimens collected      See detailed result report with images in PACS.    The patient tolerated the procedure well without incident or complication.

## 2024-12-27 ENCOUNTER — TELEPHONE (OUTPATIENT)
Dept: GASTROENTEROLOGY | Facility: CLINIC | Age: 49
End: 2024-12-27
Payer: MEDICARE

## 2024-12-27 NOTE — TELEPHONE ENCOUNTER
Received call from patients mother, she has questions regarding the ascites and follow up. Left VM to return call.

## 2024-12-29 LAB
BACTERIA FLD CULT: NORMAL
GRAM STN SPEC: NORMAL
GRAM STN SPEC: NORMAL

## 2024-12-31 DIAGNOSIS — K74.60 HEPATIC CIRRHOSIS, UNSPECIFIED HEPATIC CIRRHOSIS TYPE, UNSPECIFIED WHETHER ASCITES PRESENT (MULTI): ICD-10-CM

## 2024-12-31 DIAGNOSIS — R18.8 OTHER ASCITES: Primary | ICD-10-CM

## 2024-12-31 LAB
LABORATORY COMMENT REPORT: NORMAL
LABORATORY COMMENT REPORT: NORMAL
PATH REPORT.FINAL DX SPEC: NORMAL
PATH REPORT.GROSS SPEC: NORMAL
PATH REPORT.RELEVANT HX SPEC: NORMAL
PATH REPORT.TOTAL CANCER: NORMAL

## 2024-12-31 RX ORDER — FUROSEMIDE 40 MG/1
40 TABLET ORAL DAILY
Qty: 30 TABLET | Refills: 2 | Status: SHIPPED | OUTPATIENT
Start: 2024-12-31 | End: 2025-03-31

## 2024-12-31 RX ORDER — SPIRONOLACTONE 100 MG/1
100 TABLET, FILM COATED ORAL DAILY
Qty: 30 TABLET | Refills: 2 | Status: SHIPPED | OUTPATIENT
Start: 2024-12-31 | End: 2025-03-31

## 2025-01-03 ENCOUNTER — APPOINTMENT (OUTPATIENT)
Dept: RADIOLOGY | Facility: HOSPITAL | Age: 50
End: 2025-01-03
Payer: MEDICARE

## 2025-01-08 ENCOUNTER — APPOINTMENT (OUTPATIENT)
Dept: PHYSICAL THERAPY | Facility: CLINIC | Age: 50
End: 2025-01-08
Payer: MEDICARE

## 2025-01-13 ENCOUNTER — APPOINTMENT (OUTPATIENT)
Dept: RADIOLOGY | Facility: HOSPITAL | Age: 50
End: 2025-01-13
Payer: MEDICARE

## 2025-01-14 ENCOUNTER — TELEPHONE (OUTPATIENT)
Dept: GASTROENTEROLOGY | Facility: CLINIC | Age: 50
End: 2025-01-14
Payer: MEDICARE

## 2025-01-14 DIAGNOSIS — R18.8 OTHER ASCITES: ICD-10-CM

## 2025-01-14 NOTE — TELEPHONE ENCOUNTER
Received call from patients mother she reports patient still has abdominal swelling, she has been taking her diuretics as prescribed. Patient will have repeat labs tomorrow.

## 2025-01-15 ENCOUNTER — LAB (OUTPATIENT)
Dept: LAB | Facility: LAB | Age: 50
End: 2025-01-15
Payer: MEDICARE

## 2025-01-15 DIAGNOSIS — R18.8 OTHER ASCITES: ICD-10-CM

## 2025-01-15 DIAGNOSIS — D75.89 MACROCYTOSIS: ICD-10-CM

## 2025-01-15 DIAGNOSIS — K74.60 HEPATIC CIRRHOSIS, UNSPECIFIED HEPATIC CIRRHOSIS TYPE, UNSPECIFIED WHETHER ASCITES PRESENT (MULTI): ICD-10-CM

## 2025-01-15 DIAGNOSIS — E78.5 HYPERLIPIDEMIA, UNSPECIFIED HYPERLIPIDEMIA TYPE: ICD-10-CM

## 2025-01-15 LAB
ANION GAP SERPL CALC-SCNC: 22 MMOL/L (ref 10–20)
BUN SERPL-MCNC: 28 MG/DL (ref 6–23)
CALCIUM SERPL-MCNC: 9.8 MG/DL (ref 8.6–10.6)
CHLORIDE SERPL-SCNC: 96 MMOL/L (ref 98–107)
CO2 SERPL-SCNC: 24 MMOL/L (ref 21–32)
CREAT SERPL-MCNC: 0.93 MG/DL (ref 0.5–1.05)
EGFRCR SERPLBLD CKD-EPI 2021: 75 ML/MIN/1.73M*2
FOLATE SERPL-MCNC: 8.2 NG/ML
GLUCOSE SERPL-MCNC: 89 MG/DL (ref 74–99)
POTASSIUM SERPL-SCNC: 4 MMOL/L (ref 3.5–5.3)
SODIUM SERPL-SCNC: 138 MMOL/L (ref 136–145)
TSH SERPL-ACNC: 3.64 MIU/L (ref 0.44–3.98)
VIT B12 SERPL-MCNC: 1259 PG/ML (ref 211–911)

## 2025-01-15 PROCEDURE — 82607 VITAMIN B-12: CPT

## 2025-01-15 PROCEDURE — 80048 BASIC METABOLIC PNL TOTAL CA: CPT

## 2025-01-15 PROCEDURE — 84443 ASSAY THYROID STIM HORMONE: CPT

## 2025-01-15 PROCEDURE — 82746 ASSAY OF FOLIC ACID SERUM: CPT

## 2025-01-16 ENCOUNTER — TELEPHONE (OUTPATIENT)
Facility: CLINIC | Age: 50
End: 2025-01-16
Payer: MEDICARE

## 2025-01-16 DIAGNOSIS — R18.8 OTHER ASCITES: ICD-10-CM

## 2025-01-16 DIAGNOSIS — K74.60 HEPATIC CIRRHOSIS, UNSPECIFIED HEPATIC CIRRHOSIS TYPE, UNSPECIFIED WHETHER ASCITES PRESENT (MULTI): ICD-10-CM

## 2025-01-16 RX ORDER — FUROSEMIDE 40 MG/1
80 TABLET ORAL DAILY
Qty: 60 TABLET | Refills: 2 | Status: SHIPPED | OUTPATIENT
Start: 2025-01-16 | End: 2025-04-16

## 2025-01-16 RX ORDER — SPIRONOLACTONE 100 MG/1
200 TABLET, FILM COATED ORAL DAILY
Qty: 60 TABLET | Refills: 2 | Status: SHIPPED | OUTPATIENT
Start: 2025-01-16 | End: 2025-04-16

## 2025-01-16 NOTE — TELEPHONE ENCOUNTER
Labs reviewed with Dr Vann, plan increase lasix to 80mg daily and aldactone to 200mg daily. Labs in one week. Encouraged patient to see nephrology as suggested by patients PCP, vitamin B12 level nothing to be concerned about. Will schedule follow up appointment with Dr Vann.

## 2025-01-23 ENCOUNTER — LAB (OUTPATIENT)
Dept: LAB | Facility: LAB | Age: 50
End: 2025-01-23
Payer: MEDICARE

## 2025-01-23 DIAGNOSIS — K74.60 HEPATIC CIRRHOSIS, UNSPECIFIED HEPATIC CIRRHOSIS TYPE, UNSPECIFIED WHETHER ASCITES PRESENT (MULTI): ICD-10-CM

## 2025-01-23 DIAGNOSIS — R18.8 OTHER ASCITES: ICD-10-CM

## 2025-01-23 PROCEDURE — 80048 BASIC METABOLIC PNL TOTAL CA: CPT

## 2025-01-24 ENCOUNTER — HOSPITAL ENCOUNTER (OUTPATIENT)
Dept: RADIOLOGY | Facility: HOSPITAL | Age: 50
Discharge: HOME | End: 2025-01-24
Payer: MEDICARE

## 2025-01-24 ENCOUNTER — TELEPHONE (OUTPATIENT)
Dept: GASTROENTEROLOGY | Facility: CLINIC | Age: 50
End: 2025-01-24

## 2025-01-24 ENCOUNTER — APPOINTMENT (OUTPATIENT)
Dept: ORTHOPEDIC SURGERY | Facility: CLINIC | Age: 50
End: 2025-01-24
Payer: COMMERCIAL

## 2025-01-24 VITALS
RESPIRATION RATE: 16 BRPM | SYSTOLIC BLOOD PRESSURE: 110 MMHG | HEART RATE: 99 BPM | OXYGEN SATURATION: 99 % | DIASTOLIC BLOOD PRESSURE: 60 MMHG

## 2025-01-24 DIAGNOSIS — R18.8 OTHER ASCITES: ICD-10-CM

## 2025-01-24 LAB
ANION GAP SERPL CALC-SCNC: 21 MMOL/L (ref 10–20)
BUN SERPL-MCNC: 27 MG/DL (ref 6–23)
CALCIUM SERPL-MCNC: 9.8 MG/DL (ref 8.6–10.6)
CHLORIDE SERPL-SCNC: 95 MMOL/L (ref 98–107)
CO2 SERPL-SCNC: 28 MMOL/L (ref 21–32)
CREAT SERPL-MCNC: 1.22 MG/DL (ref 0.5–1.05)
EGFRCR SERPLBLD CKD-EPI 2021: 54 ML/MIN/1.73M*2
GLUCOSE SERPL-MCNC: 83 MG/DL (ref 74–99)
POTASSIUM SERPL-SCNC: 5.7 MMOL/L (ref 3.5–5.3)
SODIUM SERPL-SCNC: 138 MMOL/L (ref 136–145)

## 2025-01-24 PROCEDURE — P9047 ALBUMIN (HUMAN), 25%, 50ML: HCPCS | Performed by: RADIOLOGY

## 2025-01-24 PROCEDURE — 49083 ABD PARACENTESIS W/IMAGING: CPT

## 2025-01-24 PROCEDURE — 2500000004 HC RX 250 GENERAL PHARMACY W/ HCPCS (ALT 636 FOR OP/ED): Performed by: RADIOLOGY

## 2025-01-24 PROCEDURE — 2720000007 HC OR 272 NO HCPCS

## 2025-01-24 RX ORDER — ALBUMIN HUMAN 250 G/1000ML
50 SOLUTION INTRAVENOUS ONCE
Status: COMPLETED | OUTPATIENT
Start: 2025-01-24 | End: 2025-01-24

## 2025-01-24 RX ADMIN — ALBUMIN HUMAN 50 G: 0.25 SOLUTION INTRAVENOUS at 14:56

## 2025-01-24 ASSESSMENT — PAIN - FUNCTIONAL ASSESSMENT: PAIN_FUNCTIONAL_ASSESSMENT: 0-10

## 2025-01-24 ASSESSMENT — PAIN SCALES - GENERAL: PAINLEVEL_OUTOF10: 0 - NO PAIN

## 2025-01-24 NOTE — TELEPHONE ENCOUNTER
Spoke to patients mother discussed medication changes and follow up plan. She verbalized understanding.

## 2025-01-24 NOTE — NURSING NOTE
Family at bedside per patient request. Patient tolerated infusion well.  Vital signs stable. Patient/family denies questions or complaints. Ambulating independently without difficulty, accompanied by parents from the department after visit.

## 2025-01-24 NOTE — TELEPHONE ENCOUNTER
----- Message from Tyrese Vann sent at 1/24/2025  7:51 AM EST -----  We will decrease the diuretics to Lasix 60 mg daily and Aldactone 50 mg daily. We will repeat a BMP in 5-7 days.

## 2025-01-24 NOTE — POST-PROCEDURE NOTE
Interventional Radiology Brief Postprocedure Note    Attending: Adan Dawkins MD      Assistant: none    Diagnosis: ascites    Description of procedure: paracentesis     Anesthesia:  Local    Complications: None    Estimated Blood Loss: none      See detailed result report with images in PACS.    The patient tolerated the procedure well without incident or complication.

## 2025-01-26 DIAGNOSIS — E03.9 HYPOTHYROIDISM, UNSPECIFIED: ICD-10-CM

## 2025-01-28 ENCOUNTER — TELEPHONE (OUTPATIENT)
Dept: NEPHROLOGY | Facility: CLINIC | Age: 50
End: 2025-01-28
Payer: MEDICARE

## 2025-01-28 RX ORDER — LEVOTHYROXINE SODIUM 25 UG/1
TABLET ORAL
Qty: 90 TABLET | Refills: 3 | Status: SHIPPED | OUTPATIENT
Start: 2025-01-28

## 2025-01-28 NOTE — TELEPHONE ENCOUNTER
New patient living in [place], mom is hoping that you can recommend a Nephrologist closer to her home in the [facility] so that she does not have to travel this far.    Denies drug use/caffeine

## 2025-01-30 LAB
ANION GAP SERPL CALCULATED.4IONS-SCNC: 14 MMOL/L (CALC) (ref 7–17)
BUN SERPL-MCNC: 29 MG/DL (ref 7–25)
BUN/CREAT SERPL: 25 (CALC) (ref 6–22)
CALCIUM SERPL-MCNC: 9.7 MG/DL (ref 8.6–10.4)
CHLORIDE SERPL-SCNC: 96 MMOL/L (ref 98–110)
CO2 SERPL-SCNC: 25 MMOL/L (ref 20–32)
CREAT SERPL-MCNC: 1.18 MG/DL (ref 0.5–1.03)
EGFRCR SERPLBLD CKD-EPI 2021: 56 ML/MIN/1.73M2
GLUCOSE SERPL-MCNC: 97 MG/DL (ref 65–99)
POTASSIUM SERPL-SCNC: 4.5 MMOL/L (ref 3.5–5.3)
SODIUM SERPL-SCNC: 135 MMOL/L (ref 135–146)

## 2025-02-04 ENCOUNTER — APPOINTMENT (OUTPATIENT)
Dept: NEPHROLOGY | Facility: CLINIC | Age: 50
End: 2025-02-04
Payer: MEDICARE

## 2025-02-04 VITALS
DIASTOLIC BLOOD PRESSURE: 85 MMHG | SYSTOLIC BLOOD PRESSURE: 125 MMHG | HEART RATE: 109 BPM | BODY MASS INDEX: 26.27 KG/M2 | HEIGHT: 56 IN | WEIGHT: 116.8 LBS

## 2025-02-04 DIAGNOSIS — N17.9 AKI (ACUTE KIDNEY INJURY) (CMS-HCC): Primary | ICD-10-CM

## 2025-02-04 DIAGNOSIS — R80.9 PROTEINURIA, UNSPECIFIED TYPE: ICD-10-CM

## 2025-02-04 PROCEDURE — 3008F BODY MASS INDEX DOCD: CPT | Performed by: INTERNAL MEDICINE

## 2025-02-04 PROCEDURE — 1036F TOBACCO NON-USER: CPT | Performed by: INTERNAL MEDICINE

## 2025-02-04 PROCEDURE — 99203 OFFICE O/P NEW LOW 30 MIN: CPT | Performed by: INTERNAL MEDICINE

## 2025-02-04 NOTE — PROGRESS NOTES
Elizabeth Toledo   49 y.o.      Vitals:    02/04/25 1303   Weight: 53 kg (116 lb 12.8 oz)      MRN/Room: [id number]/Room/bed info not found      History Of Present Illness  Elizabeth Toledo is a 49 y.o. female presenting with high Cr level.     Past Medical History  She has a past medical history of Cirrhosis (Multi), Disruptive mood dysregulation disorder (Multi), Hyperlipidemia, Hypothyroidism, and Rogelio syndrome (HHS-HCC).    Surgical History  She has a past surgical history that includes US guided needle liver biopsy (8/2/2023).     Social History  She reports that she has never smoked. She has never been exposed to tobacco smoke. She has never used smokeless tobacco. She reports that she does not drink alcohol and does not use drugs.    Family History  Family History   Problem Relation Name Age of Onset    Diabetes Mother      Other (prediabetes) Father          Allergies  Patient has no known allergies.      Meds:       Current Outpatient Medications   Medication Sig Dispense Refill    ARIPiprazole (Abilify) 15 mg tablet Take 1 tablet (15 mg) by mouth once daily.      cholecalciferol (Vitamin D-3) 50 mcg (2,000 unit) capsule Take by mouth.      divalproex (Depakote ER) 250 mg 24 hr tablet Take 1 tablet (250 mg) by mouth once daily. 2 tablets at bedtime      FLUoxetine (PROzac) 20 mg capsule Take 3 capsules (60 mg) by mouth once daily.      furosemide (Lasix) 40 mg tablet Take 2 tablets (80 mg) by mouth once daily. (Patient taking differently: Take by mouth once daily. 1.5 tablets daily) 60 tablet 2    levothyroxine (Synthroid, Levoxyl) 25 mcg tablet TAKE 1 TABLET BY MOUTH EVERY DAY 90 tablet 3    lovastatin (Mevacor) 20 mg tablet TAKE 1 TABLET BY MOUTH EVERY DAY 90 tablet 1    spironolactone (Aldactone) 100 mg tablet Take 2 tablets (200 mg) by mouth once daily. (Patient taking differently: Take by mouth once daily. 1/2 tablet daily) 60 tablet 2    zinc gluconate 50 mg tablet Take 1 tablet (50 mg of  elemental zinc) by mouth 2 times a day. (Patient taking differently: Take 1 tablet (50 mg of elemental zinc) by mouth once daily.) 60 tablet 11    hepatitis B virus vacc.rec,PF, (ENGERIX-B) 20 mcg/mL syringe Give a dose now and in 2 months 1 mL 2     No current facility-administered medications for this visit.         ROS:  Not possible to obtain accurate ROS but as far as I could get information from her parents, she did not have any complain.    Physical Exam:        Vitals:    02/04/25 1303   BP: 125/85   Pulse: 109     General: The patient is awake, oriented, and is not in any distress.  Head and Neck: Normocephalic. No periorbital edema.  Eyes: Not icteric.   Respiratory: Symmetric chest expansion. No respiratory distress.  Skin: No maculopapular rash.  Musculoskeletal: 1+ peripheral edema.  Neuro Exam: Speech is fluent. Moves extremities.        Blood Labs:  No results found for this or any previous visit (from the past 24 hours).   Lab Results   Component Value Date    GLUCOSE 97 01/29/2025    CALCIUM 9.7 01/29/2025     01/29/2025    K 4.5 01/29/2025    CO2 25 01/29/2025    CL 96 (L) 01/29/2025    BUN 29 (H) 01/29/2025    CREATININE 1.18 (H) 01/29/2025       Imaging:  === 01/24/25 ===    US GUIDED ABDOMINAL PARACENTESIS    - Impression -  Uneventful ultrasound guided paracentesis, as detailed above.      Performed and dictated at [facility].    MACRO:  None    Signed by: Adan Dawkins 1/24/2025 4:09 PM  Dictation workstation:   [id number]      Assessment and Plan:  #1 acute kidney injury.  Last creatinine level is 1.18.  It seems her base creatinine level is somewhere around 0.5-0.6.  The patient is here in my office with her parents and I obtained most of the information from her parents.  She has recent diagnosis of liver cirrhosis which is being investigated by hepatologist.  Recently she was put on Lasix and spironolactone.  She had a recent kidney ultrasound  which was essentially unremarkable.  Her acute kidney injury could potentially be because of hepatorenal syndrome.  I asked for microscopic urinalysis and spot urine protein to creatinine ratio and repeat basic metabolic panel.  Her blood pressure is good.    #2 leg edema.  Secondary to liver cirrhosis.  Continue the current dose of diuretics.    I will see her in about 2 to 3 weeks for follow-up.    Roger Morel MD  Senior Attending Physician  Director of Onco-Nephrology Program  Division of Nephrology & Hypertension  [facility]

## 2025-02-05 ENCOUNTER — OFFICE VISIT (OUTPATIENT)
Dept: GASTROENTEROLOGY | Facility: CLINIC | Age: 50
End: 2025-02-05
Payer: MEDICARE

## 2025-02-05 VITALS
HEIGHT: 56 IN | WEIGHT: 116 LBS | TEMPERATURE: 97.7 F | BODY MASS INDEX: 26.1 KG/M2 | DIASTOLIC BLOOD PRESSURE: 71 MMHG | SYSTOLIC BLOOD PRESSURE: 113 MMHG | HEART RATE: 106 BPM

## 2025-02-05 DIAGNOSIS — R18.8 OTHER ASCITES: ICD-10-CM

## 2025-02-05 DIAGNOSIS — K74.60 ESOPHAGEAL VARICES IN CIRRHOSIS (MULTI): ICD-10-CM

## 2025-02-05 DIAGNOSIS — I85.10 ESOPHAGEAL VARICES IN CIRRHOSIS (MULTI): ICD-10-CM

## 2025-02-05 DIAGNOSIS — K74.60 HEPATIC CIRRHOSIS, UNSPECIFIED HEPATIC CIRRHOSIS TYPE, UNSPECIFIED WHETHER ASCITES PRESENT (MULTI): Primary | ICD-10-CM

## 2025-02-05 PROCEDURE — 1036F TOBACCO NON-USER: CPT | Performed by: INTERNAL MEDICINE

## 2025-02-05 PROCEDURE — 99215 OFFICE O/P EST HI 40 MIN: CPT | Performed by: INTERNAL MEDICINE

## 2025-02-05 PROCEDURE — 3008F BODY MASS INDEX DOCD: CPT | Performed by: INTERNAL MEDICINE

## 2025-02-05 ASSESSMENT — PAIN SCALES - GENERAL: PAINLEVEL_OUTOF10: 0-NO PAIN

## 2025-02-05 NOTE — PATIENT INSTRUCTIONS
Follow a 2000 mg sodium diet.    Continue your current water pill doses.    Get fluid removed from the abdomen.    Get an ultrasound of the heart.    Get labs next week.

## 2025-02-05 NOTE — PROGRESS NOTES
"HEPATOLOGY RETURN PATIENT VISIT    February 5, 2025    Dr. Gretchen Hwang       Patient Name:   ELIZABETH JOHNSTON  Medical Record Number: [id number]  YOB: 1975    Dear Dr. Hwang,    I had the pleasure of seeing Elizabeth Johnston (along with her parents) for follow-up evaluation in the [facility] ([facility] satellite office). History and physical examination was performed. Pertinent available laboratory, imaging, pathology results were reviewed.  I spent over 20 minutes reviewing her records in preparation for that visit.    Most of history is obtained from her parents because the patient has developmental delay.    History of Present Illness:   The patient is a 49 year old white female who is referred for follow-up evaluation of elevated liver function tests and hepatic adenomas.    The patient has had elevated LFTs for many years.  She was then referred to me for this problem several years later.    She had been admitted to the hospital in February 2015 with abdominal pain.  Imaging studies suggested a hepatic adenoma with some subcapsular hemorrhage.  There was no evidence of hemodynamic instability.  She was seen by the Chief of Hepatobiliary Surgery.  The recommendation was just to follow this with an intermittent imaging studies.  She never required surgery or ablation.    Even though her LFTs had been elevated for many years, I did not see a complete work-up done in the past other than some basic hepatitis serologies.  In addition, I did not see any imaging studies in many years.    In addition to her liver disease, she does have psychiatric issues and is on multiple psychiatric medications as well as valproic acid and a statin.  She has been on her psychiatric medications for many years.  Her mother describes them as a \"godsend” and reports that they are not sure what they would do if they had to be stopped.    They were under the impression that she did not need to get additional " "imaging studies of the liver.  She has not had an ultrasound since July 2017.  She has been off of birth control pills since 2015.    The patient denied any past history of acute hepatitis or jaundice.      She has never had any jaundice, hepatic encephalopathy, or variceal bleeding. She denied ever having a liver biopsy.    She initially saw me in consultation on 5/24/2023.  At that time, I did additional evaluation (see results below).  After that, she had a FibroScan test that was very technically difficult and not likely reliable and/or accurate.  For that reason, I had her undergo a liver biopsy.  This was done without complications on 8/2/2023.  This did confirm cirrhosis, but did not give an absolute etiology.    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  Otherwise, we could not prove Tin's disease.  She fell into the \"problematic” category of diagnosing her based on the latest guidelines.  For that reason, I started her on zinc gluconate on the off chance she could have Tin disease.  We also recommended that she work on JACKSON risk factors.    The patient began to have increased abdominal girth in late 2024.  Imaging study showed cirrhosis.  We had her undergo a paracentesis.  The fluid looked like portal hypertensive ascites which we felt was due to worsening cirrhosis.  We started her on Lasix 40 mg daily and Aldactone 100 mg daily on 12/26/2024.  She continued to have ascites requiring more paracenteses.  Because of that, we increased her diuretics to Lasix 80 mg daily and Aldactone 200 mg daily on 1/16/2025.  Due to electrolyte abnormalities, we decreased her diuretics to Lasix 60 mg daily and Aldactone 50 mg daily on 1/24/2025.    She underwent a 5.6 L paracentesis on 12/26/2024.  She underwent a 6.2 L paracentesis on 1/24/2025.    She presented today for follow-up evaluation.  She remains on Lasix 60 mg daily and Aldactone 50 mg daily.  They are trying to have " her follow a low-sodium diet.  However, she continues to build up ascites and edema.  She is also seeing her nephrologist.  She is tolerating the zinc gluconate 50 mg once daily well.    Past Medical/Surgical History:   ? Hyperlipidemia (272.4) (E78.5)  ? Added by Problem List Migration; 2013-6-4; Moved to Suppressed Nov 27 2013 9:11PM  ? Hepatic adenoma (211.5) (D13.4)  ? Hypothyroidism (244.9) (E03.9)  ? Elevated platelet count (790.6) (R79.89)  ? Mild vitamin D deficiency (268.9) (E55.9)  ? 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 Tremor (781.0) (R25.1)  ? Overweight with body mass index (BMI) of 27 to 27.9 in adult (278.02,V85.23)  (E66.3,Z68.27)  ? Mood swings (799.24) (R45.86)  ? Benign neoplasm of liver (211.5) (D13.4)  ? Colon cancer screening (V76.51) (Z12.11)  ? Elevated platelet count (790.6) (R79.89)  ? Encounter for immunization (V03.89) (Z23)  ? Hepatic adenoma (211.5) (D13.4)  ? Hyperlipidemia (272.4) (E78.5)  ? Added by Problem List Migration; 2013-6-4; Moved to Suppressed Nov 27 2013 9:11PM  ? Hypothyroidism (244.9) (E03.9)  ? Lymphedema (457.1) (I89.0)  ? Medication-induced postural tremor (333.1,E980.5) (G25.1)  ? Mild vitamin D deficiency (268.9) (E55.9)  ? Mood disorder (296.90) (F39)  ? Mood swings (799.24) (R45.86)  ? Skin lesion (709.9) (L98.9)  ? Tremor (781.0) (R25.1)  ? 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 History of abnormal mammogram (V15.89) (Z87.898)  ? History of edema (V13.89) (Z87.898)  ? Resolved Date: 31 Jan 2023  ? Added by Problem List Migration; 2013-8-27; Moved to Suppressed Nov 27 2013 9:11PM  COVID    Family History:   Mother  ? Family history of diabetes mellitus (V18.0) (Z83.3)  ? Family history of hyperlipidemia (V18.19) (Z83.438)  ? Family history of hypertension (V17.49) (Z82.49)  Father  ? Family history of hyperlipidemia (V18.19) (Z83.438)  There is no known family history of colon cancer.  There is no known family history of liver disease.    Social History:   She  denied tobacco use.  She denied alcohol use.  She denied tattoos.  She denied intravenous drug use.  She denied blood transfusions.  She is attending a daily adult  center and is quite happy and is making friends.    Review of systems: As noted above. She has worsening ascites and edema. No fever, chills, weight loss, visual changes, auditory changes, shortness of breath, chest pain, abdominal pain, GI bleeding, diarrhea, constipation, depression, dysuria, hematuria, musculoskeletal issues, or rash.       Medical, Surgical, Family, and Social Histories  Past Medical History:   Diagnosis Date    Cirrhosis (Multi)     Disruptive mood dysregulation disorder (Multi)     Hyperlipidemia     Hypothyroidism     Rogelio syndrome (HHS-HCC)        Past Surgical History:   Procedure Laterality Date    US GUIDED NEEDLE LIVER BIOPSY  8/2/2023    US GUIDED NEEDLE LIVER BIOPSY 8/2/2023 [facility]       Family History   Problem Relation Name Age of Onset    Diabetes Mother      Other (prediabetes) Father         Social History     Socioeconomic History    Marital status: Single     Spouse name: Not on file    Number of children: Not on file    Years of education: Not on file    Highest education level: Not on file   Occupational History    Not on file   Tobacco Use    Smoking status: Never     Passive exposure: Never    Smokeless tobacco: Never   Vaping Use    Vaping status: Never Used   Substance and Sexual Activity    Alcohol use: Never    Drug use: Never    Sexual activity: Not on file   Other Topics Concern    Not on file   Social History Narrative    Not on file     Social Drivers of Health     Financial Resource Strain: Not on File (5/20/2021)    Received from [facility]    Financial Resource Strain     Financial Resource Strain: 0   Food Insecurity: Not on File (9/26/2024)    Received from [facility]    Food Insecurity     Food: 0   Transportation Needs: Not on File (5/20/2021)    Received from [facility]    Transportation  Needs     Transportation: 0   Physical Activity: Not on File (2021)    Received from [facility]    Physical Activity     Physical Activity: 0   Stress: Not on File (2021)    Received from [facility]    Stress     Stress: 0   Social Connections: Not on File (2024)    Received from [facility]    Social Connections     Connectedness: 0   Intimate Partner Violence: Not on file   Housing Stability: Not on File (2021)    Received from [facility]    Housing Stability     Housin       Allergies and Current Medications  No Known Allergies  Current Outpatient Medications   Medication Sig Dispense Refill    ARIPiprazole (Abilify) 15 mg tablet Take 1 tablet (15 mg) by mouth once daily.      cholecalciferol (Vitamin D-3) 50 mcg (2,000 unit) capsule Take by mouth.      divalproex (Depakote ER) 250 mg 24 hr tablet Take 1 tablet (250 mg) by mouth once daily. 2 tablets at bedtime      FLUoxetine (PROzac) 20 mg capsule Take 3 capsules (60 mg) by mouth once daily.      furosemide (Lasix) 40 mg tablet Take 2 tablets (80 mg) by mouth once daily. (Patient taking differently: Take by mouth once daily. 1.5 tablets daily) 60 tablet 2    hepatitis B virus vacc.rec,PF, (ENGERIX-B) 20 mcg/mL syringe Give a dose now and in 2 months 1 mL 2    levothyroxine (Synthroid, Levoxyl) 25 mcg tablet TAKE 1 TABLET BY MOUTH EVERY DAY 90 tablet 3    lovastatin (Mevacor) 20 mg tablet TAKE 1 TABLET BY MOUTH EVERY DAY 90 tablet 1    spironolactone (Aldactone) 100 mg tablet Take 2 tablets (200 mg) by mouth once daily. (Patient taking differently: Take by mouth once daily. 1/2 tablet daily) 60 tablet 2    zinc gluconate 50 mg tablet Take 1 tablet (50 mg of elemental zinc) by mouth 2 times a day. (Patient taking differently: Take 1 tablet (50 mg of elemental zinc) by mouth once daily.) 60 tablet 11     No current facility-administered medications for this visit.        Physical Exam  /71   Pulse 106   Temp 36.5 °C (97.7 °F)  "(Temporal)   Ht 1.422 m (4' 8\")   Wt 52.6 kg (116 lb)   BMI 26.01 kg/m²       Physical Examination:   General Appearance: alert and in no acute distress.  She is quite short in stature.  HEENT: oropharynx without lesions. Anicteric sclerae.   Neck supple, nontender, without adenopathy, thyromegaly, or JVD.   Lungs clear to auscultation and percussion.   Heart RRR without murmurs, rubs, or gallops.   Abdomen: Soft, nontender, bowel sounds positive, with large ascites.  I cannot appreciate hepatosplenomegaly due to the ascites.  Extremities full ROM, no atrophy, normal strength. Minimal tenderness and swelling in the right thigh, but not the right calf.  2+ bilateral lower extremity edema.  Skin no specific lesions.   Neurological exam reveals that she is alert and awake.  She does have a tremor bilaterally.  No obvious asterixis.  No spider angiomata, but she does have bilateral palmar erythema.     Labs 1/29/2025 glucose 97, sodium 135, potassium 4.5, creatinine 1.18.    Labs 1/23/2025 glucose 83, sodium 138, potassium 5.7, creatinine 1.22.    Labs 1/15/2025 creatinine 0.93, glucose 89, sodium 138, TSH 3.64.    Ascites 12/26/2024 albumin 1.1, protein 1.8, WBC 61, cytology negative for malignancy, culture negative.    Labs 12/16/2024 glucose 89, sodium 144, creatinine 0.58, AFP < 4, alkaline phosphatase isoenzymes show significant elevation in the liver fraction with mild elevation in the bone fraction, cholesterol 213, , triglycerides 157, hepatitis B surface antibody positive, protein 8, albumin 3.8, alk phos 289, bilirubin 1.4, direct bilirubin 0.3, AST 84, ALT 43, INR 1.2, WBC 7.7, hemoglobin 14.2, , platelets 348.    Labs 8/7/2024 hemoglobin A1c 5.4%, INR 1.2, WBC 7.4, hemoglobin 12.9, platelets 271, protein 8, albumin 4.3, alk phos 112, bilirubin 1, AST 93, ALT 54, glucose 88, sodium 143, creatinine 0.65, AFP < 4.    Labs 2/26/2024 cholesterol 195, , triglycerides 136.    Labs 1/17/2024 " INR 1.1, AFP < 4, WBC 8.8, hemoglobin 13.3, platelets 333, protein 7.9, albumin 4.6, alk phos 60, bilirubin 0.6, , ALT 63, glucose 95, sodium 141, creatinine 0.69.    Labs 12/16/2023 COVID PCR positive.    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    Labs 9/7/2023 ceruloplasmin 31.    Labs 7/26/2023 WBC 8.1, hemoglobin 13.4, platelets 342, glucose 101, sodium 143, creatinine 0.72, protein 7.7, albumin 4.5, alk phos 50, bilirubin 0.7, , ALT 74, INR 1.1.    Labs 6/8/2023 AFP < 4, HAV +, HBsAg -, HBsAb -, HCV -, ferritin 108, iron sat 15%, PAKO -, AMA -, SMA 40, A1AT phenotype MM.    Labs 2/3/2023 TSH 2.08, vitamin D was 26, glucose 77, sodium 142, creatinine 0.68, protein 7.8, albumin 4.8, alk phos 46, bilirubin 0.6, , ALT 82, cholesterol 192, , HDL 59, triglycerides 133, WBC 8.5, hemoglobin 13.6, platelets 390.    Labs 3/9/2022 AST 54, ALT 40.    Labs 1/26/2021 AST 70, ALT 61.    Labs 7/20/2018 alk phos 31, AST 40, ALT 37.    Labs 3/14/2015 ALT 78.    Labs 2/27/2015 ferritin 201, iron saturation 6%, hepatitis B surface antigen negative, hepatitis C antibody negative, AFP < 1.    Labs 2/25/2015 .    Labs 1/19/2015 ALT 44.    Labs 1/18/2007 alk phos 39, bilirubin 2.1, AST 43, ALT 28.    MRI 12/19/2024:  IMPRESSION:  1.  Findings compatible with cirrhosis, portal hypertension and liver  decompensation with a large amount of ascites.  2. No MRI evidence of HCC.  3. Measurable reduction of pre-existing probably benign liver lesions.      Ultrasound 12/17/2024:  IMPRESSION:  1.  Hepatomegaly with findings of hepatic steatosis.  2. Moderate amount of ascites.  3. Stable appearance of echogenic lesion in the right hepatic lobe  with sonographic features suggesting hemangioma. If further  evaluation is clinically warranted, liver MRI may be obtained.  4. Gallbladder sludge.      US 7/31/2024:  IMPRESSION:  Hepatomegaly and right  hepatic lobe echogenic subcentimeter lesions,  likely represent adenomas, seen on the MRI of the liver from 2016.      Ultrasound 1/10/2024:  IMPRESSION:  Mild hepatomegaly.      Redemonstration of scattered small hypoechoic lesions, nonspecific,  however statistically likely representing hemangiomas. Hepatic mass  protocol MRI may be obtained for further assessment.      US 6/7/2023:  IMPRESSION:  Hepatomegaly.  Multiple small echogenic liver lesions favored to represent  hemangiomas or adenomas but incompletely characterized sonographically    Ultrasound 7/6/2017:  IMPRESSION:  Multiple scattered nonshadowing echogenic liver lesions as described.  The largest lesion is slightly larger today. Otherwise, these are  grossly stable.  Remainder of the exam was negative.    MRI 2/19/2016:  IMPRESSION:  1. Interval decrease in size of segment 7 lesion now measuring 1.9 x  2.1 x 2.3 cm versus 2.1 x 2.4 x 2.3 cm on prior exam, likely adenoma  complicated by prior hemorrhage.  2. Stable segment 7 probable hemangioma.  3. Stable redemonstration of hepatic segment 4A and 6 sub cm lesions  which, while evaluation is limited due to motion degradation of early  postcontrast images, may represent slow filling hemangiomas or  adenomas.  4. No evidence of new hepatic lesions.    CAT scan with contrast 1/19/2015:  Impression:  1. Limited examination due to motion artifact.  2. No obvious acute abdominal or pelvic process.  3. Heterogeneous mass in the right lobe of the liver measuring 4.1 x  4.9 cm. This could represent a hepatic adenoma or FNH, however,  malignant hepatic masses are not excluded. Further evaluation with  liver MRI is recommended. This may be performed on a nonemergent  basis.  4. Hypodensity in the pancreatic body likely represents prominent fat  between pancreatic lobules. However, the appearance is somewhat  unusual due to motion artifact. Attention to this area on followup is  recommended.    EGD  5/29/2024:  Impression  The esophagus appeared normal.  Abnormal mucosa, consistent with gastritis in the antrum; performed cold forceps biopsy  The fundus of the stomach and body of the stomach appeared normal.  The duodenum appeared normal.      Liver biopsy 8/2/2023:  FINAL DIAGNOSIS  A.  RANDOM LIVER BIOPSY:    -- LIVER PARENCHYMA WITH CIRRHOSIS    Note: The liver tissue is nodular with bridging and circumferential fibrosis.  The portal tracts are mildly expanded with mostly lymphocytes.  No significant  bile duct damage or interface hepatitis is seen.  The hepatocytes demonstrate  Mirella Denk bodies and feathery degeneration with rare acidophil bodies.  No  cholestasis is seen.      Special stains:  Iron               negative  PAS with digestion     no cytoplasmic eosinophilic globules  Trichrome          bridging fibrosis and circumferential fibrosis  Reticulin          no collapsed liver parenchyma  Copper               negative       FibroScan 7/12/2023 revealed a median liver stiffness of 33.7 kPa with IQR 33% and .        Assessment/Plan:     Elevated liver function tests  History of hepatic adenoma  Cirrhosis  Ascites    The patient is referred to me for follow-up evaluation of the above issues.    As noted above, the patient has had elevated LFTs for many years.  Other than imaging studies and hepatitis serologies, I see no particular work-up having been done for this in the past.  For that reason, I did a full serologic work-up.  Her serologic work-up for other causes of liver disease was essentially unrevealing.    In addition, she is on medications which can affect the liver such as valproic acid, Abilify, and Prozac.  Depending on our work-up, her other providers may need to decide whether some of her medicine should be stopped or switched to see if this helps the LFTs.  Unfortunately, her mother admits that it would be very difficult to consider stopping any of her psychiatric  medications.    With her history of psychiatric issues and liver disease and a low alkaline phosphatase, we would need to consider Tin's disease in our differential.  Her ceruloplasmin was normal.  She was not able to cooperate with a 24-hour urine copper test.  She did see her eye doctor on 5/24/2023 and her mother told us that they saw no evidence of Wen Fleischer rings.  The genetic test showed one abnormality but was not diagnostic for Tin's disease.    Given the unreliable FibroScan as well as concern for Tin's disease, we had her undergo a liver biopsy.  This was done in August 2023 and confirmed cirrhosis.  Unfortunately, they could not determine an etiology.  Of note, the copper stain was negative.    Again, even though Tin disease seems somewhat unlikely, I cannot completely rule it out.  For that reason, I am comfortable just leaving her on the elemental zinc.  She is tolerating the low-dose of 50 mg once daily.  I would just keep her on that dose.    She is immune to hepatitis A and B.    With a history of an adenoma, these can enlarge and can even undergo malignant transformation.  We had her undergo an ultrasound that showed that these were relatively stable.  She should remain off of her birth control pills.  Now that we know that she has cirrhosis, she should get AFP and ultrasound every 6 months anyway.    With her cirrhosis, and given the high liver stiffness on FibroScan, she underwent an EGD in May 2024.  This was done with anesthesia assistance.  Luckily, there were no varices seen.  We could consider repeating this around May 2026.    Her parents do not feel that she would be able to do a colonoscopy preparation.  She has actually had a Cologuard in the past that was reportedly negative and they will pass on attempting colonoscopy.    Unfortunately, she has now developed ascites.  She did undergo paracentesis.  The ascites appeared most consistent with portal hypertensive ascites.   We are having some issues with her diuretics.  She is having some renal insufficiency and electrolyte abnormalities because of this.  We will have to continue to make adjustments in her diuretics and will likely need to use a combination of paracentesis and low-dose diuretics.  We did  them on a low-sodium diet.  If we cannot get her ascites under good control, we may need to consider other options such as TIPS or even transplant if the parents are interested in pursuing that option.  I did speak with them about both of these options.  I asked them to discuss this amongst themselves and we will call them next week to get some ideas from them.    For now, I did  them on a low-sodium diet.  We will recheck her labs next week and likely make changes in her diuretic doses.    I will set her up for another paracentesis.    Her alkaline phosphatase did go up significantly.  I did do isoenzymes to confirm that this is from a liver source.  AFP was negative.  MRI suggested no evidence of malignancy.  We will need to continue to follow this.    Thank you for allowing me to participate in the care of this patient. Please feel free to contact me with any questions regarding their care.     Sincerely,     Tyrese Vann MD, FAASLD, FACG.   Medical Director, Hepatology  Senior Attending Physician  [facility]  [facility]  Professor of Medicine  Division of Gastroenterology and Liver Disease  [employer]  [address] [place] [id number]  Phone: [phone]  Fax: [phone].      This document was generated with a computerized dictation system.  Because of this, there could be errors in grammar and/or content.

## 2025-02-06 DIAGNOSIS — R18.8 OTHER ASCITES: ICD-10-CM

## 2025-02-06 DIAGNOSIS — R60.1 GENERALIZED EDEMA: ICD-10-CM

## 2025-02-10 ENCOUNTER — HOSPITAL ENCOUNTER (OUTPATIENT)
Dept: RADIOLOGY | Facility: HOSPITAL | Age: 50
Discharge: HOME | End: 2025-02-10
Payer: MEDICARE

## 2025-02-10 ENCOUNTER — TELEPHONE (OUTPATIENT)
Dept: GASTROENTEROLOGY | Facility: CLINIC | Age: 50
End: 2025-02-10
Payer: MEDICARE

## 2025-02-10 VITALS
RESPIRATION RATE: 16 BRPM | DIASTOLIC BLOOD PRESSURE: 73 MMHG | HEART RATE: 69 BPM | SYSTOLIC BLOOD PRESSURE: 119 MMHG | OXYGEN SATURATION: 97 %

## 2025-02-10 DIAGNOSIS — R18.8 OTHER ASCITES: ICD-10-CM

## 2025-02-10 DIAGNOSIS — K74.60 HEPATIC CIRRHOSIS, UNSPECIFIED HEPATIC CIRRHOSIS TYPE, UNSPECIFIED WHETHER ASCITES PRESENT (MULTI): ICD-10-CM

## 2025-02-10 PROCEDURE — P9047 ALBUMIN (HUMAN), 25%, 50ML: HCPCS | Performed by: RADIOLOGY

## 2025-02-10 PROCEDURE — 2720000007 HC OR 272 NO HCPCS

## 2025-02-10 PROCEDURE — 76937 US GUIDE VASCULAR ACCESS: CPT

## 2025-02-10 PROCEDURE — 49083 ABD PARACENTESIS W/IMAGING: CPT

## 2025-02-10 PROCEDURE — C1729 CATH, DRAINAGE: HCPCS

## 2025-02-10 PROCEDURE — 2500000004 HC RX 250 GENERAL PHARMACY W/ HCPCS (ALT 636 FOR OP/ED): Performed by: RADIOLOGY

## 2025-02-10 RX ORDER — FUROSEMIDE 40 MG/1
60 TABLET ORAL DAILY
Qty: 135 TABLET | Refills: 3 | Status: SHIPPED | OUTPATIENT
Start: 2025-02-10

## 2025-02-10 RX ORDER — ALBUMIN HUMAN 250 G/1000ML
50 SOLUTION INTRAVENOUS ONCE
Status: COMPLETED | OUTPATIENT
Start: 2025-02-10 | End: 2025-02-10

## 2025-02-10 RX ADMIN — ALBUMIN HUMAN 50 G: 0.25 SOLUTION INTRAVENOUS at 12:52

## 2025-02-10 ASSESSMENT — PAIN - FUNCTIONAL ASSESSMENT: PAIN_FUNCTIONAL_ASSESSMENT: 0-10

## 2025-02-10 ASSESSMENT — PAIN SCALES - GENERAL: PAINLEVEL_OUTOF10: 0 - NO PAIN

## 2025-02-12 DIAGNOSIS — I85.10 ESOPHAGEAL VARICES IN CIRRHOSIS (MULTI): ICD-10-CM

## 2025-02-12 DIAGNOSIS — K74.60 HEPATIC CIRRHOSIS, UNSPECIFIED HEPATIC CIRRHOSIS TYPE, UNSPECIFIED WHETHER ASCITES PRESENT (MULTI): ICD-10-CM

## 2025-02-12 DIAGNOSIS — K74.60 ESOPHAGEAL VARICES IN CIRRHOSIS (MULTI): ICD-10-CM

## 2025-02-12 DIAGNOSIS — R18.8 OTHER ASCITES: ICD-10-CM

## 2025-02-13 ENCOUNTER — TELEPHONE (OUTPATIENT)
Dept: PRIMARY CARE | Facility: CLINIC | Age: 50
End: 2025-02-13
Payer: MEDICARE

## 2025-02-13 DIAGNOSIS — E78.5 HYPERLIPIDEMIA, UNSPECIFIED HYPERLIPIDEMIA TYPE: Primary | ICD-10-CM

## 2025-02-13 LAB
ALBUMIN SERPL-MCNC: 3.8 G/DL (ref 3.6–5.1)
ALBUMIN/GLOB SERPL: 1 (CALC) (ref 1–2.5)
ALP SERPL-CCNC: 216 U/L (ref 37–153)
ALT SERPL-CCNC: 67 U/L (ref 6–29)
ANION GAP SERPL CALCULATED.4IONS-SCNC: 17 MMOL/L (CALC) (ref 7–17)
ANION GAP SERPL CALCULATED.4IONS-SCNC: 17 MMOL/L (CALC) (ref 7–17)
AST SERPL-CCNC: 156 U/L (ref 10–35)
BASOPHILS # BLD AUTO: 127 CELLS/UL (ref 0–200)
BASOPHILS NFR BLD AUTO: 1 %
BILIRUB DIRECT SERPL-MCNC: 0.2 MG/DL
BILIRUB INDIRECT SERPL-MCNC: 0.7 MG/DL (CALC) (ref 0.2–1.2)
BILIRUB SERPL-MCNC: 0.9 MG/DL (ref 0.2–1.2)
BUN SERPL-MCNC: 32 MG/DL (ref 7–25)
BUN SERPL-MCNC: 32 MG/DL (ref 7–25)
BUN/CREAT SERPL: 30 (CALC) (ref 6–22)
BUN/CREAT SERPL: 32 (CALC) (ref 6–22)
CALCIUM SERPL-MCNC: 9.6 MG/DL (ref 8.6–10.4)
CALCIUM SERPL-MCNC: 9.7 MG/DL (ref 8.6–10.4)
CHLORIDE SERPL-SCNC: 97 MMOL/L (ref 98–110)
CHLORIDE SERPL-SCNC: 97 MMOL/L (ref 98–110)
CO2 SERPL-SCNC: 21 MMOL/L (ref 20–32)
CO2 SERPL-SCNC: 21 MMOL/L (ref 20–32)
CREAT SERPL-MCNC: 0.99 MG/DL (ref 0.5–1.03)
CREAT SERPL-MCNC: 1.08 MG/DL (ref 0.5–1.03)
EGFRCR SERPLBLD CKD-EPI 2021: 63 ML/MIN/1.73M2
EGFRCR SERPLBLD CKD-EPI 2021: 69 ML/MIN/1.73M2
EOSINOPHIL # BLD AUTO: 76 CELLS/UL (ref 15–500)
EOSINOPHIL NFR BLD AUTO: 0.6 %
ERYTHROCYTE [DISTWIDTH] IN BLOOD BY AUTOMATED COUNT: 13.9 % (ref 11–15)
GLOBULIN SER CALC-MCNC: 3.8 G/DL (CALC) (ref 1.9–3.7)
GLUCOSE SERPL-MCNC: 102 MG/DL (ref 65–99)
GLUCOSE SERPL-MCNC: 103 MG/DL (ref 65–99)
HCT VFR BLD AUTO: 41.1 % (ref 35–45)
HGB BLD-MCNC: 13.8 G/DL (ref 11.7–15.5)
INR PPP: 1.2
LYMPHOCYTES # BLD AUTO: 2146 CELLS/UL (ref 850–3900)
LYMPHOCYTES NFR BLD AUTO: 16.9 %
MCH RBC QN AUTO: 34.7 PG (ref 27–33)
MCHC RBC AUTO-ENTMCNC: 33.6 G/DL (ref 32–36)
MCV RBC AUTO: 103.3 FL (ref 80–100)
MONOCYTES # BLD AUTO: 851 CELLS/UL (ref 200–950)
MONOCYTES NFR BLD AUTO: 6.7 %
NEUTROPHILS # BLD AUTO: 9500 CELLS/UL (ref 1500–7800)
NEUTROPHILS NFR BLD AUTO: 74.8 %
PLATELET # BLD AUTO: 345 THOUSAND/UL (ref 140–400)
PMV BLD REES-ECKER: 11.8 FL (ref 7.5–12.5)
POTASSIUM SERPL-SCNC: 4.4 MMOL/L (ref 3.5–5.3)
POTASSIUM SERPL-SCNC: 4.5 MMOL/L (ref 3.5–5.3)
PROT SERPL-MCNC: 7.6 G/DL (ref 6.1–8.1)
PROTHROMBIN TIME: 12.5 SEC (ref 9–11.5)
RBC # BLD AUTO: 3.98 MILLION/UL (ref 3.8–5.1)
SODIUM SERPL-SCNC: 135 MMOL/L (ref 135–146)
SODIUM SERPL-SCNC: 135 MMOL/L (ref 135–146)
WBC # BLD AUTO: 12.7 THOUSAND/UL (ref 3.8–10.8)

## 2025-02-13 RX ORDER — FENOFIBRATE 134 MG/1
134 CAPSULE ORAL
Qty: 90 CAPSULE | Refills: 3 | Status: SHIPPED | OUTPATIENT
Start: 2025-02-13 | End: 2026-02-13

## 2025-02-13 NOTE — TELEPHONE ENCOUNTER
Elina would like to restart fenofibrate for Elizabeth. She states she was on it for years and since she been off of it her lab numbers have increased. If approved can you send to [facility] in [place]... If denied she would like a call back to know why...

## 2025-02-14 LAB
BACTERIA #/AREA URNS HPF: ABNORMAL /HPF
CREAT UR-MCNC: 76 MG/DL (ref 20–275)
HYALINE CASTS #/AREA URNS LPF: ABNORMAL /LPF
PROT UR-MCNC: <4 MG/DL (ref 5–24)
PROT/CREAT UR: ABNORMAL MG/G CREAT (ref 24–184)
PROT/CREAT UR: ABNORMAL MG/MG CREAT (ref 0.02–0.18)
RBC #/AREA URNS HPF: ABNORMAL /HPF
SERVICE CMNT-IMP: ABNORMAL
SQUAMOUS #/AREA URNS HPF: ABNORMAL /HPF
WBC #/AREA URNS HPF: ABNORMAL /HPF

## 2025-02-17 ENCOUNTER — HOSPITAL ENCOUNTER (OUTPATIENT)
Dept: CARDIOLOGY | Facility: CLINIC | Age: 50
Discharge: HOME | End: 2025-02-17
Payer: MEDICARE

## 2025-02-17 DIAGNOSIS — R60.1 GENERALIZED EDEMA: ICD-10-CM

## 2025-02-17 DIAGNOSIS — R18.8 OTHER ASCITES: ICD-10-CM

## 2025-02-17 LAB
EJECTION FRACTION APICAL 4 CHAMBER: 54.8
EJECTION FRACTION: 60 %
LEFT ATRIUM VOLUME AREA LENGTH INDEX BSA: 85.8 ML/M2
LEFT VENTRICLE INTERNAL DIMENSION DIASTOLE: 3.6 CM (ref 3.5–6)
LEFT VENTRICULAR OUTFLOW TRACT DIAMETER: 1.6 CM
MITRAL VALVE E/A RATIO: 1.05
RIGHT VENTRICLE FREE WALL PEAK S': 12.6 CM/S
TRICUSPID ANNULAR PLANE SYSTOLIC EXCURSION: 2 CM

## 2025-02-17 PROCEDURE — 93306 TTE W/DOPPLER COMPLETE: CPT | Performed by: INTERNAL MEDICINE

## 2025-02-17 PROCEDURE — 93306 TTE W/DOPPLER COMPLETE: CPT

## 2025-02-19 ENCOUNTER — APPOINTMENT (OUTPATIENT)
Dept: NEPHROLOGY | Facility: CLINIC | Age: 50
End: 2025-02-19
Payer: MEDICARE

## 2025-02-19 VITALS
HEART RATE: 100 BPM | HEIGHT: 56 IN | SYSTOLIC BLOOD PRESSURE: 116 MMHG | DIASTOLIC BLOOD PRESSURE: 79 MMHG | BODY MASS INDEX: 25.64 KG/M2 | WEIGHT: 114 LBS

## 2025-02-19 DIAGNOSIS — N17.9 AKI (ACUTE KIDNEY INJURY) (CMS-HCC): Primary | ICD-10-CM

## 2025-02-19 PROCEDURE — 1036F TOBACCO NON-USER: CPT | Performed by: INTERNAL MEDICINE

## 2025-02-19 PROCEDURE — 3008F BODY MASS INDEX DOCD: CPT | Performed by: INTERNAL MEDICINE

## 2025-02-19 PROCEDURE — 99213 OFFICE O/P EST LOW 20 MIN: CPT | Performed by: INTERNAL MEDICINE

## 2025-02-19 NOTE — PROGRESS NOTES
Elizabeth Toledo   49 y.o.    @@  [facility]/Room: [id number]/Room/bed info not found    Subjective:   The patient is being seen for a routine clinic follow-up of DANIELLA.      Meds:   Current Outpatient Medications   Medication Sig Dispense Refill    ARIPiprazole (Abilify) 15 mg tablet Take 1 tablet (15 mg) by mouth once daily.      cholecalciferol (Vitamin D-3) 50 mcg (2,000 unit) capsule Take by mouth.      divalproex (Depakote ER) 250 mg 24 hr tablet Take 1 tablet (250 mg) by mouth once daily. 2 tablets at bedtime      fenofibrate micronized (Lofibra) 134 mg capsule Take 1 capsule (134 mg) by mouth once daily with breakfast. 90 capsule 3    FLUoxetine (PROzac) 20 mg capsule Take 3 capsules (60 mg) by mouth once daily.      furosemide (Lasix) 40 mg tablet Take 1.5 tablets (60 mg) by mouth once daily. 135 tablet 3    hepatitis B virus vacc.rec,PF, (ENGERIX-B) 20 mcg/mL syringe Give a dose now and in 2 months 1 mL 2    levothyroxine (Synthroid, Levoxyl) 25 mcg tablet TAKE 1 TABLET BY MOUTH EVERY DAY 90 tablet 3    lovastatin (Mevacor) 20 mg tablet TAKE 1 TABLET BY MOUTH EVERY DAY 90 tablet 1    spironolactone (Aldactone) 100 mg tablet Take 2 tablets (200 mg) by mouth once daily. (Patient taking differently: Take 1 tablet (100 mg) by mouth once daily. 1/2 tablet daily) 60 tablet 2    zinc gluconate 50 mg tablet Take 1 tablet (50 mg of elemental zinc) by mouth 2 times a day. 60 tablet 11     No current facility-administered medications for this visit.          ROS:  The patient is awake and oriented. No dizziness or lightheadedness. No chills and no fever. No headaches. No nausea and no vomiting. No shortness of breath. No cough. No chest pain. No abdominal pain. No diarrhea. No hematemesis or hemoptysis. No hematuria. No rectal bleeding. No melena. No epistaxis. No urinary symptoms. No flank pain. No leg edema. No itching. Overall, the rest of the review of systems is also negative.  12 point review of systems otherwise  negative as stated in HPI.        Physical Examination:        Vitals:    02/19/25 1226   BP: 116/79   Pulse: 100     General: The patient is awake, oriented, and is not in any distress.  Head and Neck: Normocephalic. No periorbital edema.  Eyes: non-icteric  Respiratory: Symmetric chest expansion. No respiratory distress.  Skin: No maculopapular rash.  Musculoskeletal: No peripheral edema.  Neuro Exam: Speech is fluent. Moves extremities.    Imaging:  === 02/10/25 ===    US GUIDED ABDOMINAL PARACENTESIS    - Impression -  Uneventful ultrasound guided paracentesis, as detailed above.      Performed and dictated at [facility].    MACRO:  None    Signed by: Adan Dawkins 2/10/2025 3:05 PM  Dictation workstation:   [id number]       Blood Labs:  No results found for this or any previous visit (from the past 24 hours).   Lab Results   Component Value Date    PROTUR 100 (2+) (A) 12/16/2024      Lab Results   Component Value Date    GLUCOSE 102 (H) 02/12/2025    CALCIUM 9.6 02/12/2025     02/12/2025    K 4.5 02/12/2025    CO2 21 02/12/2025    CL 97 (L) 02/12/2025    BUN 32 (H) 02/12/2025    CREATININE 0.99 02/12/2025         Assessment and Plan:  #1 acute kidney injury.  Last creatinine level is 0.99.  It seems her base creatinine level is somewhere around 0.5-0.6.  The patient is here in my office with her mother and I obtained most of the information from her.  She has recent diagnosis of liver cirrhosis which is being investigated by hepatologist.  Recently she was put on Lasix and spironolactone.  She had a recent kidney ultrasound which was essentially unremarkable.  No proteinuria.  No microscopic hematuria.  Normal potassium and bicarb level.  Her acute kidney injury could potentially be because of hepatorenal syndrome. Her blood pressure is good.     #2  Liver cirrhosis.     I will see her in about 6-month for follow-up.          Roger Morel MD  Senior Attending  Physician  Director of Onco-Nephrology Program  Division of Nephrology & Hypertension  [facility]

## 2025-02-27 ENCOUNTER — HOSPITAL ENCOUNTER (OUTPATIENT)
Dept: RADIOLOGY | Facility: HOSPITAL | Age: 50
Discharge: HOME | End: 2025-02-27
Payer: MEDICARE

## 2025-02-27 VITALS — SYSTOLIC BLOOD PRESSURE: 120 MMHG | HEART RATE: 103 BPM | OXYGEN SATURATION: 99 % | DIASTOLIC BLOOD PRESSURE: 65 MMHG

## 2025-02-27 DIAGNOSIS — R18.8 OTHER ASCITES: ICD-10-CM

## 2025-02-27 PROCEDURE — 49083 ABD PARACENTESIS W/IMAGING: CPT

## 2025-02-27 PROCEDURE — 2500000004 HC RX 250 GENERAL PHARMACY W/ HCPCS (ALT 636 FOR OP/ED): Performed by: RADIOLOGY

## 2025-02-27 PROCEDURE — 2720000007 HC OR 272 NO HCPCS

## 2025-02-27 PROCEDURE — 76937 US GUIDE VASCULAR ACCESS: CPT

## 2025-02-27 PROCEDURE — P9047 ALBUMIN (HUMAN), 25%, 50ML: HCPCS | Performed by: RADIOLOGY

## 2025-02-27 RX ORDER — ALBUMIN HUMAN 250 G/1000ML
25 SOLUTION INTRAVENOUS ONCE
Status: COMPLETED | OUTPATIENT
Start: 2025-02-27 | End: 2025-02-27

## 2025-02-27 RX ADMIN — ALBUMIN HUMAN 25 G: 0.25 SOLUTION INTRAVENOUS at 12:24

## 2025-02-27 NOTE — POST-PROCEDURE NOTE
Interventional Radiology Brief Postprocedure Note    Attending: Adan Dawkins MD      Assistant: none    Diagnosis: ascites    Description of procedure: paracentesis     Anesthesia:  Local    Complications: None    Estimated Blood Loss: none    No specimens collected      See detailed result report with images in PACS.    The patient tolerated the procedure well without incident or complication.

## 2025-03-03 ENCOUNTER — APPOINTMENT (OUTPATIENT)
Dept: RADIOLOGY | Facility: HOSPITAL | Age: 50
End: 2025-03-03
Payer: MEDICARE

## 2025-03-04 ENCOUNTER — TELEPHONE (OUTPATIENT)
Dept: GASTROENTEROLOGY | Facility: CLINIC | Age: 50
End: 2025-03-04
Payer: MEDICARE

## 2025-03-04 NOTE — TELEPHONE ENCOUNTER
Received call from patients mother regarding patients medication fenofibrate and risk for gall stones. Discussed with Dr Vann, there is theoretically a risk for gall stones with this medication, but the risk is low, she should discuss the risk vs benefit with her PCP.

## 2025-03-10 ENCOUNTER — HOSPITAL ENCOUNTER (OUTPATIENT)
Dept: RADIOLOGY | Facility: HOSPITAL | Age: 50
Discharge: HOME | End: 2025-03-10
Payer: MEDICARE

## 2025-03-10 VITALS
RESPIRATION RATE: 16 BRPM | SYSTOLIC BLOOD PRESSURE: 106 MMHG | HEART RATE: 75 BPM | OXYGEN SATURATION: 96 % | DIASTOLIC BLOOD PRESSURE: 65 MMHG

## 2025-03-10 DIAGNOSIS — R18.8 OTHER ASCITES: ICD-10-CM

## 2025-03-10 PROCEDURE — 2720000007 HC OR 272 NO HCPCS

## 2025-03-10 PROCEDURE — 49083 ABD PARACENTESIS W/IMAGING: CPT

## 2025-03-10 PROCEDURE — P9047 ALBUMIN (HUMAN), 25%, 50ML: HCPCS | Performed by: RADIOLOGY

## 2025-03-10 PROCEDURE — 2500000004 HC RX 250 GENERAL PHARMACY W/ HCPCS (ALT 636 FOR OP/ED): Performed by: RADIOLOGY

## 2025-03-10 RX ORDER — ALBUMIN HUMAN 250 G/1000ML
25 SOLUTION INTRAVENOUS ONCE
Status: COMPLETED | OUTPATIENT
Start: 2025-03-10 | End: 2025-03-10

## 2025-03-10 RX ADMIN — ALBUMIN HUMAN 25 G: 0.25 SOLUTION INTRAVENOUS at 10:36

## 2025-03-12 ENCOUNTER — APPOINTMENT (OUTPATIENT)
Dept: RADIOLOGY | Facility: HOSPITAL | Age: 50
End: 2025-03-12
Payer: MEDICARE

## 2025-03-13 ENCOUNTER — TELEPHONE (OUTPATIENT)
Facility: CLINIC | Age: 50
End: 2025-03-13
Payer: MEDICARE

## 2025-03-13 NOTE — TELEPHONE ENCOUNTER
Spoke to patients mother discussed frequency of paracentesis is now weekly and patient is more tired and is needing more assistance with ADL's.

## 2025-03-17 ENCOUNTER — HOSPITAL ENCOUNTER (OUTPATIENT)
Dept: RADIOLOGY | Facility: HOSPITAL | Age: 50
Discharge: HOME | End: 2025-03-17
Payer: MEDICARE

## 2025-03-17 VITALS
RESPIRATION RATE: 16 BRPM | HEART RATE: 90 BPM | DIASTOLIC BLOOD PRESSURE: 62 MMHG | SYSTOLIC BLOOD PRESSURE: 105 MMHG | OXYGEN SATURATION: 99 %

## 2025-03-17 DIAGNOSIS — R18.8 OTHER ASCITES: ICD-10-CM

## 2025-03-17 PROCEDURE — 49083 ABD PARACENTESIS W/IMAGING: CPT

## 2025-03-17 PROCEDURE — 2720000007 HC OR 272 NO HCPCS

## 2025-03-17 PROCEDURE — P9047 ALBUMIN (HUMAN), 25%, 50ML: HCPCS | Performed by: RADIOLOGY

## 2025-03-17 PROCEDURE — 2500000004 HC RX 250 GENERAL PHARMACY W/ HCPCS (ALT 636 FOR OP/ED): Performed by: RADIOLOGY

## 2025-03-17 RX ORDER — ALBUMIN HUMAN 250 G/1000ML
25 SOLUTION INTRAVENOUS ONCE
Status: COMPLETED | OUTPATIENT
Start: 2025-03-17 | End: 2025-03-17

## 2025-03-17 RX ADMIN — ALBUMIN HUMAN 25 G: 0.25 SOLUTION INTRAVENOUS at 12:21

## 2025-03-17 ASSESSMENT — PAIN - FUNCTIONAL ASSESSMENT
PAIN_FUNCTIONAL_ASSESSMENT: 0-10
PAIN_FUNCTIONAL_ASSESSMENT: 0-10

## 2025-03-17 ASSESSMENT — PAIN SCALES - GENERAL
PAINLEVEL_OUTOF10: 0 - NO PAIN
PAINLEVEL_OUTOF10: 0 - NO PAIN

## 2025-03-17 NOTE — POST-PROCEDURE NOTE
Interventional Radiology Brief Postprocedure Note    Attending: Adan Dawkins MD    Diagnosis: ascites    Description of procedure: paracentesis     Anesthesia:  Local    Complications: None    Estimated Blood Loss: none  No specimens collected      See detailed result report with images in PACS.    The patient tolerated the procedure well without incident or complication.

## 2025-03-17 NOTE — NURSING NOTE
Patient tolerated infusion well. Vital signs stable. Patient denies questions or complaints. Father at bedside providing standby assistance and transport. Patient ambulating steadily without difficulty escorted by father from department after visit.

## 2025-03-18 ENCOUNTER — HOSPITAL ENCOUNTER (EMERGENCY)
Facility: HOSPITAL | Age: 50
Discharge: LEFT BEFORE DISCHARGE | End: 2025-03-18
Payer: MEDICARE

## 2025-03-18 VITALS
WEIGHT: 90 LBS | SYSTOLIC BLOOD PRESSURE: 99 MMHG | HEIGHT: 56 IN | TEMPERATURE: 97.1 F | OXYGEN SATURATION: 100 % | HEART RATE: 99 BPM | RESPIRATION RATE: 18 BRPM | BODY MASS INDEX: 20.24 KG/M2 | DIASTOLIC BLOOD PRESSURE: 65 MMHG

## 2025-03-18 DIAGNOSIS — K74.60 HEPATIC CIRRHOSIS, UNSPECIFIED HEPATIC CIRRHOSIS TYPE, UNSPECIFIED WHETHER ASCITES PRESENT (MULTI): ICD-10-CM

## 2025-03-18 DIAGNOSIS — R18.8 OTHER ASCITES: ICD-10-CM

## 2025-03-18 PROCEDURE — 4500999001 HC ED NO CHARGE: Performed by: EMERGENCY MEDICINE

## 2025-03-18 PROCEDURE — 99281 EMR DPT VST MAYX REQ PHY/QHP: CPT

## 2025-03-18 RX ORDER — SPIRONOLACTONE 50 MG/1
50 TABLET, FILM COATED ORAL DAILY
Qty: 30 TABLET | Refills: 6 | Status: SHIPPED | OUTPATIENT
Start: 2025-03-18

## 2025-03-18 ASSESSMENT — PAIN DESCRIPTION - ORIENTATION: ORIENTATION: RIGHT;LEFT

## 2025-03-18 ASSESSMENT — PAIN DESCRIPTION - FREQUENCY: FREQUENCY: INTERMITTENT

## 2025-03-18 ASSESSMENT — PAIN DESCRIPTION - LOCATION: LOCATION: ABDOMEN

## 2025-03-18 ASSESSMENT — PAIN SCALES - GENERAL: PAINLEVEL_OUTOF10: 5 - MODERATE PAIN

## 2025-03-18 ASSESSMENT — PAIN DESCRIPTION - DESCRIPTORS: DESCRIPTORS: SHARP

## 2025-03-18 ASSESSMENT — PAIN - FUNCTIONAL ASSESSMENT: PAIN_FUNCTIONAL_ASSESSMENT: 0-10

## 2025-03-18 ASSESSMENT — PAIN DESCRIPTION - PAIN TYPE: TYPE: ACUTE PAIN

## 2025-03-18 NOTE — TELEPHONE ENCOUNTER
Received call from patients mother. Discussed weight loss patient is now 90lbs after last paracentesis. Recommended speaking with PCP regarding nutritional supplements.

## 2025-03-18 NOTE — ED TRIAGE NOTES
Patient's mother states patient has been vomiting today and having abdominal pain. Pt has a hx of liver cirrhosis. Pt has paracentesis done regularly and had one done yesterday.

## 2025-03-19 ENCOUNTER — TELEPHONE (OUTPATIENT)
Dept: GASTROENTEROLOGY | Facility: CLINIC | Age: 50
End: 2025-03-19
Payer: MEDICARE

## 2025-03-19 NOTE — TELEPHONE ENCOUNTER
Spoke to patients mother, she reports that patient had some brown colored vomiting yesterday, she went to the local ED they did a CT scan and discharged her. She will take patient to get labs drawn. Will follow up with results.

## 2025-03-21 ENCOUNTER — HOSPITAL ENCOUNTER (INPATIENT)
Age: 50
End: 2025-03-21
Attending: STUDENT IN AN ORGANIZED HEALTH CARE EDUCATION/TRAINING PROGRAM
Payer: MEDICARE

## 2025-03-21 ENCOUNTER — APPOINTMENT (OUTPATIENT)
Dept: RADIOLOGY | Facility: HOSPITAL | Age: 50
End: 2025-03-21
Payer: MEDICARE

## 2025-03-21 ENCOUNTER — APPOINTMENT (OUTPATIENT)
Dept: CARDIOLOGY | Facility: HOSPITAL | Age: 50
End: 2025-03-21
Payer: MEDICARE

## 2025-03-21 ENCOUNTER — HOSPITAL ENCOUNTER (EMERGENCY)
Facility: HOSPITAL | Age: 50
End: 2025-03-21
Attending: STUDENT IN AN ORGANIZED HEALTH CARE EDUCATION/TRAINING PROGRAM
Payer: MEDICARE

## 2025-03-21 DIAGNOSIS — K72.90 LIVER FAILURE WITHOUT HEPATIC COMA, UNSPECIFIED CHRONICITY (MULTI): ICD-10-CM

## 2025-03-21 DIAGNOSIS — K76.7 HEPATORENAL SYNDROME (MULTI): ICD-10-CM

## 2025-03-21 DIAGNOSIS — K81.9 CHOLECYSTITIS: Primary | ICD-10-CM

## 2025-03-21 LAB
AFP-TM SERPL-MCNC: 2.2 NG/ML
ALBUMIN SERPL BCP-MCNC: 3.1 G/DL (ref 3.4–5)
ALBUMIN SERPL-MCNC: 3.5 G/DL (ref 3.6–5.1)
ALBUMIN/GLOB SERPL: 0.9 (CALC) (ref 1–2.5)
ALP SERPL-CCNC: 136 U/L (ref 37–153)
ALP SERPL-CCNC: 99 U/L (ref 33–110)
ALT SERPL W P-5'-P-CCNC: 67 U/L (ref 7–45)
ALT SERPL-CCNC: 80 U/L (ref 6–29)
ANION GAP SERPL CALC-SCNC: 19 MMOL/L (ref 10–20)
ANION GAP SERPL CALCULATED.4IONS-SCNC: 20 MMOL/L (CALC) (ref 7–17)
APPEARANCE UR: CLEAR
AST SERPL W P-5'-P-CCNC: 117 U/L (ref 9–39)
AST SERPL-CCNC: 146 U/L (ref 10–35)
ATRIAL RATE: 83 BPM
BASOPHILS # BLD AUTO: 0.08 X10*3/UL (ref 0–0.1)
BASOPHILS # BLD AUTO: 78 CELLS/UL (ref 0–200)
BASOPHILS NFR BLD AUTO: 0.3 %
BASOPHILS NFR BLD AUTO: 0.4 %
BILIRUB DIRECT SERPL-MCNC: ABNORMAL MG/DL
BILIRUB SERPL-MCNC: 3 MG/DL (ref 0.2–1.2)
BILIRUB SERPL-MCNC: 3 MG/DL (ref 0–1.2)
BILIRUB UR STRIP.AUTO-MCNC: NEGATIVE MG/DL
BUN SERPL-MCNC: 140 MG/DL (ref 6–23)
BUN SERPL-MCNC: 145 MG/DL (ref 7–25)
BUN/CREAT SERPL: 31 (CALC) (ref 6–22)
CALCIUM SERPL-MCNC: 9.7 MG/DL (ref 8.6–10.3)
CALCIUM SERPL-MCNC: 9.7 MG/DL (ref 8.6–10.4)
CARDIAC TROPONIN I PNL SERPL HS: 8 NG/L (ref 0–13)
CHLORIDE SERPL-SCNC: 92 MMOL/L (ref 98–107)
CHLORIDE SERPL-SCNC: 92 MMOL/L (ref 98–110)
CO2 SERPL-SCNC: 17 MMOL/L (ref 20–32)
CO2 SERPL-SCNC: 22 MMOL/L (ref 21–32)
COLOR UR: YELLOW
CREAT SERPL-MCNC: 4.66 MG/DL (ref 0.5–1.05)
CREAT SERPL-MCNC: 4.74 MG/DL (ref 0.5–1.03)
EGFRCR SERPLBLD CKD-EPI 2021: 11 ML/MIN/1.73M*2
EGFRCR SERPLBLD CKD-EPI 2021: 11 ML/MIN/1.73M2
EOSINOPHIL # BLD AUTO: 0.05 X10*3/UL (ref 0–0.7)
EOSINOPHIL # BLD AUTO: 98 CELLS/UL (ref 15–500)
EOSINOPHIL NFR BLD AUTO: 0.2 %
EOSINOPHIL NFR BLD AUTO: 0.5 %
ERYTHROCYTE [DISTWIDTH] IN BLOOD BY AUTOMATED COUNT: 13.6 % (ref 11–15)
ERYTHROCYTE [DISTWIDTH] IN BLOOD BY AUTOMATED COUNT: 16.1 % (ref 11.5–14.5)
GLOBULIN SER CALC-MCNC: 3.8 G/DL (CALC) (ref 1.9–3.7)
GLUCOSE SERPL-MCNC: 126 MG/DL (ref 65–139)
GLUCOSE SERPL-MCNC: 137 MG/DL (ref 74–99)
GLUCOSE UR STRIP.AUTO-MCNC: NORMAL MG/DL
HCT VFR BLD AUTO: 34.5 % (ref 36–46)
HCT VFR BLD AUTO: 35.2 % (ref 35–45)
HGB BLD-MCNC: 12.2 G/DL (ref 12–16)
HGB BLD-MCNC: 12.6 G/DL (ref 11.7–15.5)
HYALINE CASTS #/AREA URNS AUTO: ABNORMAL /LPF
IMM GRANULOCYTES # BLD AUTO: 0.31 X10*3/UL (ref 0–0.7)
IMM GRANULOCYTES NFR BLD AUTO: 1.2 % (ref 0–0.9)
INR PPP: 1.4
INR PPP: 1.5 (ref 0.9–1.1)
KETONES UR STRIP.AUTO-MCNC: NEGATIVE MG/DL
LACTATE SERPL-SCNC: 1.8 MMOL/L (ref 0.4–2)
LEUKOCYTE ESTERASE UR QL STRIP.AUTO: ABNORMAL
LIPASE SERPL-CCNC: 143 U/L (ref 9–82)
LYMPHOCYTES # BLD AUTO: 1.35 X10*3/UL (ref 1.2–4.8)
LYMPHOCYTES # BLD AUTO: 1658 CELLS/UL (ref 850–3900)
LYMPHOCYTES NFR BLD AUTO: 5.4 %
LYMPHOCYTES NFR BLD AUTO: 8.5 %
MCH RBC QN AUTO: 35.1 PG (ref 26–34)
MCH RBC QN AUTO: 35.8 PG (ref 27–33)
MCHC RBC AUTO-ENTMCNC: 35.4 G/DL (ref 32–36)
MCHC RBC AUTO-ENTMCNC: 35.8 G/DL (ref 32–36)
MCV RBC AUTO: 100 FL (ref 80–100)
MCV RBC AUTO: 99 FL (ref 80–100)
MONOCYTES # BLD AUTO: 1.33 X10*3/UL (ref 0.1–1)
MONOCYTES # BLD AUTO: 1170 CELLS/UL (ref 200–950)
MONOCYTES NFR BLD AUTO: 5.3 %
MONOCYTES NFR BLD AUTO: 6 %
MUCOUS THREADS #/AREA URNS AUTO: ABNORMAL /LPF
NEUTROPHILS # BLD AUTO: 22.11 X10*3/UL (ref 1.2–7.7)
NEUTROPHILS # BLD AUTO: ABNORMAL CELLS/UL (ref 1500–7800)
NEUTROPHILS NFR BLD AUTO: 84.6 %
NEUTROPHILS NFR BLD AUTO: 87.6 %
NITRITE UR QL STRIP.AUTO: NEGATIVE
NRBC BLD-RTO: 0 /100 WBCS (ref 0–0)
P AXIS: 64 DEGREES
PH UR STRIP.AUTO: 5 [PH]
PLATELET # BLD AUTO: 352 X10*3/UL (ref 150–450)
PLATELET # BLD AUTO: 363 THOUSAND/UL (ref 140–400)
PMV BLD REES-ECKER: 12 FL (ref 7.5–12.5)
POTASSIUM SERPL-SCNC: 5.2 MMOL/L (ref 3.5–5.3)
POTASSIUM SERPL-SCNC: 5.6 MMOL/L (ref 3.5–5.3)
PR INTERVAL: 128 MS
PROT SERPL-MCNC: 7.2 G/DL (ref 6.4–8.2)
PROT SERPL-MCNC: 7.3 G/DL (ref 6.1–8.1)
PROT UR STRIP.AUTO-MCNC: NEGATIVE MG/DL
PROTHROMBIN TIME: 14.8 SEC (ref 9–11.5)
PROTHROMBIN TIME: 16.5 SECONDS (ref 9.8–12.4)
Q ONSET: 253 MS
QRS COUNT: 13 BEATS
QRS DURATION: 95 MS
QT INTERVAL: 412 MS
QTC CALCULATION(BAZETT): 485 MS
QTC FREDERICIA: 459 MS
R AXIS: 16 DEGREES
RBC # BLD AUTO: 3.48 X10*6/UL (ref 4–5.2)
RBC # BLD AUTO: 3.52 MILLION/UL (ref 3.8–5.1)
RBC # UR STRIP.AUTO: NEGATIVE MG/DL
RBC #/AREA URNS AUTO: ABNORMAL /HPF
SODIUM SERPL-SCNC: 128 MMOL/L (ref 136–145)
SODIUM SERPL-SCNC: 129 MMOL/L (ref 135–146)
SP GR UR STRIP.AUTO: 1.01
SQUAMOUS #/AREA URNS AUTO: ABNORMAL /HPF
T AXIS: 25 DEGREES
T OFFSET: 459 MS
UROBILINOGEN UR STRIP.AUTO-MCNC: NORMAL MG/DL
VENTRICULAR RATE: 83 BPM
WBC # BLD AUTO: 19.5 THOUSAND/UL (ref 3.8–10.8)
WBC # BLD AUTO: 25.2 X10*3/UL (ref 4.4–11.3)
WBC #/AREA URNS AUTO: ABNORMAL /HPF

## 2025-03-21 PROCEDURE — 36415 COLL VENOUS BLD VENIPUNCTURE: CPT | Performed by: NURSE PRACTITIONER

## 2025-03-21 PROCEDURE — 96375 TX/PRO/DX INJ NEW DRUG ADDON: CPT | Mod: 59

## 2025-03-21 PROCEDURE — 71046 X-RAY EXAM CHEST 2 VIEWS: CPT | Performed by: RADIOLOGY

## 2025-03-21 PROCEDURE — 96365 THER/PROPH/DIAG IV INF INIT: CPT | Mod: 59

## 2025-03-21 PROCEDURE — 85025 COMPLETE CBC W/AUTO DIFF WBC: CPT | Performed by: NURSE PRACTITIONER

## 2025-03-21 PROCEDURE — P9047 ALBUMIN (HUMAN), 25%, 50ML: HCPCS | Performed by: STUDENT IN AN ORGANIZED HEALTH CARE EDUCATION/TRAINING PROGRAM

## 2025-03-21 PROCEDURE — 76705 ECHO EXAM OF ABDOMEN: CPT

## 2025-03-21 PROCEDURE — 49083 ABD PARACENTESIS W/IMAGING: CPT | Performed by: STUDENT IN AN ORGANIZED HEALTH CARE EDUCATION/TRAINING PROGRAM

## 2025-03-21 PROCEDURE — 83605 ASSAY OF LACTIC ACID: CPT | Performed by: NURSE PRACTITIONER

## 2025-03-21 PROCEDURE — 87086 URINE CULTURE/COLONY COUNT: CPT | Mod: PORLAB | Performed by: NURSE PRACTITIONER

## 2025-03-21 PROCEDURE — 99285 EMERGENCY DEPT VISIT HI MDM: CPT | Mod: 25 | Performed by: STUDENT IN AN ORGANIZED HEALTH CARE EDUCATION/TRAINING PROGRAM

## 2025-03-21 PROCEDURE — 2500000004 HC RX 250 GENERAL PHARMACY W/ HCPCS (ALT 636 FOR OP/ED): Performed by: EMERGENCY MEDICINE

## 2025-03-21 PROCEDURE — 93005 ELECTROCARDIOGRAM TRACING: CPT | Mod: 59

## 2025-03-21 PROCEDURE — 71046 X-RAY EXAM CHEST 2 VIEWS: CPT

## 2025-03-21 PROCEDURE — 96367 TX/PROPH/DG ADDL SEQ IV INF: CPT | Mod: 59

## 2025-03-21 PROCEDURE — 76705 ECHO EXAM OF ABDOMEN: CPT | Performed by: RADIOLOGY

## 2025-03-21 PROCEDURE — 96361 HYDRATE IV INFUSION ADD-ON: CPT | Mod: 59

## 2025-03-21 PROCEDURE — 96366 THER/PROPH/DIAG IV INF ADDON: CPT | Mod: 59

## 2025-03-21 PROCEDURE — 84075 ASSAY ALKALINE PHOSPHATASE: CPT | Performed by: NURSE PRACTITIONER

## 2025-03-21 PROCEDURE — 2500000004 HC RX 250 GENERAL PHARMACY W/ HCPCS (ALT 636 FOR OP/ED): Performed by: STUDENT IN AN ORGANIZED HEALTH CARE EDUCATION/TRAINING PROGRAM

## 2025-03-21 PROCEDURE — 83690 ASSAY OF LIPASE: CPT | Performed by: NURSE PRACTITIONER

## 2025-03-21 PROCEDURE — 84484 ASSAY OF TROPONIN QUANT: CPT | Performed by: NURSE PRACTITIONER

## 2025-03-21 PROCEDURE — 81001 URINALYSIS AUTO W/SCOPE: CPT | Performed by: NURSE PRACTITIONER

## 2025-03-21 PROCEDURE — 87075 CULTR BACTERIA EXCEPT BLOOD: CPT | Mod: PORLAB | Performed by: STUDENT IN AN ORGANIZED HEALTH CARE EDUCATION/TRAINING PROGRAM

## 2025-03-21 PROCEDURE — 85610 PROTHROMBIN TIME: CPT | Performed by: NURSE PRACTITIONER

## 2025-03-21 RX ORDER — ALBUMIN HUMAN 250 G/1000ML
12.5 SOLUTION INTRAVENOUS ONCE
Status: COMPLETED | OUTPATIENT
Start: 2025-03-21 | End: 2025-03-21

## 2025-03-21 RX ORDER — CEFTRIAXONE 2 G/50ML
2 INJECTION, SOLUTION INTRAVENOUS ONCE
Status: COMPLETED | OUTPATIENT
Start: 2025-03-21 | End: 2025-03-21

## 2025-03-21 RX ORDER — LIDOCAINE HYDROCHLORIDE AND EPINEPHRINE 10; 10 UG/ML; MG/ML
10 INJECTION, SOLUTION INFILTRATION; PERINEURAL ONCE
Status: COMPLETED | OUTPATIENT
Start: 2025-03-21 | End: 2025-03-21

## 2025-03-21 RX ORDER — ALBUMIN HUMAN 250 G/1000ML
1 SOLUTION INTRAVENOUS ONCE
Status: DISCONTINUED | OUTPATIENT
Start: 2025-03-21 | End: 2025-03-21

## 2025-03-21 RX ORDER — ALBUMIN HUMAN 250 G/1000ML
25 SOLUTION INTRAVENOUS ONCE
Status: COMPLETED | OUTPATIENT
Start: 2025-03-22 | End: 2025-03-22

## 2025-03-21 RX ORDER — ALBUMIN HUMAN 250 G/1000ML
25 SOLUTION INTRAVENOUS ONCE
Status: COMPLETED | OUTPATIENT
Start: 2025-03-21 | End: 2025-03-21

## 2025-03-21 RX ORDER — FENTANYL CITRATE 50 UG/ML
25 INJECTION, SOLUTION INTRAMUSCULAR; INTRAVENOUS ONCE
Status: COMPLETED | OUTPATIENT
Start: 2025-03-21 | End: 2025-03-21

## 2025-03-21 RX ADMIN — CEFTRIAXONE 2 G: 2 INJECTION, SOLUTION INTRAVENOUS at 16:33

## 2025-03-21 RX ADMIN — LIDOCAINE HYDROCHLORIDE,EPINEPHRINE BITARTRATE 10 ML: 10; .01 INJECTION, SOLUTION INFILTRATION; PERINEURAL at 15:21

## 2025-03-21 RX ADMIN — ALBUMIN HUMAN 25 G: 0.25 SOLUTION INTRAVENOUS at 18:39

## 2025-03-21 RX ADMIN — FENTANYL CITRATE 25 MCG: 50 INJECTION INTRAMUSCULAR; INTRAVENOUS at 22:40

## 2025-03-21 RX ADMIN — SODIUM CHLORIDE 1000 ML: 0.9 INJECTION, SOLUTION INTRAVENOUS at 16:33

## 2025-03-21 RX ADMIN — ALBUMIN HUMAN 12.5 G: 0.25 SOLUTION INTRAVENOUS at 18:01

## 2025-03-21 ASSESSMENT — HEART SCORE
AGE: 45-64
HEART SCORE: 2
RISK FACTORS: 1-2 RISK FACTORS
HISTORY: SLIGHTLY SUSPICIOUS
ECG: NORMAL
TROPONIN: LESS THAN OR EQUAL TO NORMAL LIMIT

## 2025-03-21 ASSESSMENT — PAIN SCALES - GENERAL
PAINLEVEL_OUTOF10: 10 - WORST POSSIBLE PAIN
PAINLEVEL_OUTOF10: 10 - WORST POSSIBLE PAIN
PAINLEVEL_OUTOF10: 0 - NO PAIN

## 2025-03-21 ASSESSMENT — PAIN DESCRIPTION - LOCATION: LOCATION: ABDOMEN

## 2025-03-21 ASSESSMENT — LIFESTYLE VARIABLES
HAVE YOU EVER FELT YOU SHOULD CUT DOWN ON YOUR DRINKING: NO
EVER HAD A DRINK FIRST THING IN THE MORNING TO STEADY YOUR NERVES TO GET RID OF A HANGOVER: NO
TOTAL SCORE: 0
HAVE PEOPLE ANNOYED YOU BY CRITICIZING YOUR DRINKING: NO
EVER FELT BAD OR GUILTY ABOUT YOUR DRINKING: NO

## 2025-03-21 ASSESSMENT — PAIN DESCRIPTION - FREQUENCY: FREQUENCY: INTERMITTENT

## 2025-03-21 ASSESSMENT — PAIN DESCRIPTION - DESCRIPTORS
DESCRIPTORS: OTHER (COMMENT)
DESCRIPTORS: DISCOMFORT

## 2025-03-21 ASSESSMENT — PAIN - FUNCTIONAL ASSESSMENT: PAIN_FUNCTIONAL_ASSESSMENT: 0-10

## 2025-03-21 ASSESSMENT — PAIN DESCRIPTION - PAIN TYPE: TYPE: ACUTE PAIN

## 2025-03-21 NOTE — TELEPHONE ENCOUNTER
Patient had labs drawn yesterday elevated WBC. Per Dr Vann patient to go to ED for eval. Spoke with patients mother she will take patient [place] [facility]

## 2025-03-21 NOTE — ED PROVIDER NOTES
Chief Complaint   Patient presents with    Abdominal Pain     Pt was sent in from her PCP for abnormal labs, pt reportedly cirrhosis and receives a paracentesis weekly her. Pt is also reporting intermittent abdominal pain.    Abnormal Labs       HPI       49 year old female presents to the Emergency Department today complaining of intermittent diffuse abdominal pain that she is unable to characterize and varies in intensity. Notes that she has chronic abdominal swelling secondary to liver cirrhosis for which she receives a paracentesis weekly for. Noted to have an elevated WBC count on outpatient labs yesterday. Thus, prompting evaluation in the ED. Parents report that she has had a decreased appetite and constipation. Denies any associated fever, chills, headache, neck pain, chest pain, shortness of breath, nausea, vomiting, diarrhea, hematemesis, hematochezia, melena, or urinary symptoms.       History provided by:  Parent             Patient History   Past Medical History:   Diagnosis Date    Cirrhosis (Multi)     Disruptive mood dysregulation disorder (Multi)     Hyperlipidemia     Hypothyroidism     Rogelio syndrome (HHS-HCC)      Past Surgical History:   Procedure Laterality Date    US GUIDED NEEDLE LIVER BIOPSY  8/2/2023    US GUIDED NEEDLE LIVER BIOPSY 8/2/2023 [facility]     Family History   Problem Relation Name Age of Onset    Diabetes Mother      Other (prediabetes) Father       Social History     Tobacco Use    Smoking status: Never     Passive exposure: Never    Smokeless tobacco: Never   Vaping Use    Vaping status: Never Used   Substance Use Topics    Alcohol use: Never    Drug use: Never           Physical Exam  Constitutional:       Appearance: Normal appearance.   HENT:      Head: Normocephalic.      Right Ear: Tympanic membrane, ear canal and external ear normal.      Left Ear: Tympanic membrane, ear canal and external ear normal.      Nose: Nose normal.      Mouth/Throat:      Mouth: Mucous  membranes are moist.      Pharynx: Oropharynx is clear. No oropharyngeal exudate or posterior oropharyngeal erythema.   Eyes:      Conjunctiva/sclera: Conjunctivae normal.      Pupils: Pupils are equal, round, and reactive to light.   Cardiovascular:      Rate and Rhythm: Normal rate and regular rhythm.      Pulses:           Radial pulses are 3+ on the right side and 3+ on the left side.        Dorsalis pedis pulses are 3+ on the right side and 3+ on the left side.      Heart sounds: Normal heart sounds. No murmur heard.     No friction rub. No gallop.   Pulmonary:      Effort: Pulmonary effort is normal. No respiratory distress.      Breath sounds: Normal breath sounds. No wheezing, rhonchi or rales.   Abdominal:      General: Abdomen is protuberant. Bowel sounds are normal.      Palpations: Abdomen is soft.      Tenderness: There is no abdominal tenderness. There is no right CVA tenderness, left CVA tenderness, guarding or rebound. Negative signs include Shannon's sign and McBurney's sign.   Musculoskeletal:         General: No swelling or deformity.      Cervical back: Full passive range of motion without pain.      Right lower leg: No edema.      Left lower leg: No edema.   Lymphadenopathy:      Cervical: No cervical adenopathy.   Skin:     Capillary Refill: Capillary refill takes less than 2 seconds.      Coloration: Skin is not jaundiced.      Findings: No rash.   Neurological:      General: No focal deficit present.      Mental Status: She is alert and oriented to person, place, and time. Mental status is at baseline.      Gait: Gait is intact.   Psychiatric:         Mood and Affect: Mood normal.         Behavior: Behavior is cooperative.         Labs Reviewed   CBC WITH AUTO DIFFERENTIAL - Abnormal       Result Value    WBC 25.2 (*)     nRBC 0.0      RBC 3.48 (*)     Hemoglobin 12.2      Hematocrit 34.5 (*)     MCV 99      MCH 35.1 (*)     MCHC 35.4      RDW 16.1 (*)     Platelets 352      Neutrophils % 87.6       Immature Granulocytes %, Automated 1.2 (*)     Lymphocytes % 5.4      Monocytes % 5.3      Eosinophils % 0.2      Basophils % 0.3      Neutrophils Absolute 22.11 (*)     Immature Granulocytes Absolute, Automated 0.31      Lymphocytes Absolute 1.35      Monocytes Absolute 1.33 (*)     Eosinophils Absolute 0.05      Basophils Absolute 0.08     COMPREHENSIVE METABOLIC PANEL - Abnormal    Glucose 137 (*)     Sodium 128 (*)     Potassium 5.2      Chloride 92 (*)     Bicarbonate 22      Anion Gap 19      Urea Nitrogen 140 (*)     Creatinine 4.66 (*)     eGFR 11 (*)     Calcium 9.7      Albumin 3.1 (*)     Alkaline Phosphatase 99      Total Protein 7.2       (*)     Bilirubin, Total 3.0 (*)     ALT 67 (*)    LIPASE - Abnormal    Lipase 143 (*)     Narrative:     Venipuncture immediately after or during the administration of Metamizole may lead to falsely low results. Testing should be performed immediately prior to Metamizole dosing.   PROTIME-INR - Abnormal    Protime 16.5 (*)     INR 1.5 (*)    LACTATE - Normal    Lactate 1.8      Narrative:     Venipuncture immediately after or during the administration of Metamizole may lead to falsely low results. Testing should be performed immediately prior to Metamizole dosing.   SERIAL TROPONIN-INITIAL - Normal    Troponin I, High Sensitivity 8      Narrative:     Less than 99th percentile of normal range cutoff-  Female and children under 18 years old <14 ng/L; Male <21 ng/L: Negative  Repeat testing should be performed if clinically indicated.     Female and children under 18 years old 14-50 ng/L; Male 21-50 ng/L:  Consistent with possible cardiac damage and possible increased clinical   risk. Serial measurements may help to assess extent of myocardial damage.     >50 ng/L: Consistent with cardiac damage, increased clinical risk and  myocardial infarction. Serial measurements may help assess extent of   myocardial damage.      NOTE: Children less than 1 year old may  have higher baseline troponin   levels and results should be interpreted in conjunction with the overall   clinical context.     NOTE: Troponin I testing is performed using a different   testing methodology at [facility] than at other   [facility]. Direct result comparisons should only   be made within the same method.   SERIAL TROPONIN, 1 HOUR - Normal    Troponin I, High Sensitivity 8      Narrative:     Less than 99th percentile of normal range cutoff-  Female and children under 18 years old <14 ng/L; Male <21 ng/L: Negative  Repeat testing should be performed if clinically indicated.     Female and children under 18 years old 14-50 ng/L; Male 21-50 ng/L:  Consistent with possible cardiac damage and possible increased clinical   risk. Serial measurements may help to assess extent of myocardial damage.     >50 ng/L: Consistent with cardiac damage, increased clinical risk and  myocardial infarction. Serial measurements may help assess extent of   myocardial damage.      NOTE: Children less than 1 year old may have higher baseline troponin   levels and results should be interpreted in conjunction with the overall   clinical context.     NOTE: Troponin I testing is performed using a different   testing methodology at [facility] than at other   [facility]. Direct result comparisons should only   be made within the same method.   TROPONIN SERIES- (INITIAL, 1 HR)    Narrative:     The following orders were created for panel order Troponin I Series, High Sensitivity (0, 1 HR).  Procedure                               Abnormality         Status                     ---------                               -----------         ------                     Troponin I, High Sensiti...[[id number]]  Normal              Final result               Troponin, High Sensitivi...[[id number]]  Normal              Final result                 Please view results for these tests on the individual orders.    URINALYSIS WITH REFLEX CULTURE AND MICROSCOPIC    Narrative:     The following orders were created for panel order Urinalysis with Reflex Culture and Microscopic.  Procedure                               Abnormality         Status                     ---------                               -----------         ------                     Urinalysis with Reflex C...[[id number]]                                                 Extra Urine Gray Tube[[id number]]                                                         Please view results for these tests on the individual orders.   URINALYSIS WITH REFLEX CULTURE AND MICROSCOPIC   EXTRA URINE GRAY TUBE   SERIAL TROPONIN, 1 HOUR       XR chest 2 views   Final Result   No acute cardiopulmonary disease.        Signed by: Lesley Benjamin 3/21/2025 1:37 PM   Dictation workstation:   [id number]               ED Course & MDM            Medical Decision Making  EKG interpreted by Dr. Cavazos. Indication: abdominal pain. Findings: NSR with a ventricular rate of 83, normal axis, normal intervals, and no acute ischemic or injury pattern. Impression: No acute pathology.       Patient was seen and evaluated by myself. Record review shows that she had an outpatient CT scan of her abdomen and pelvis without contrast shows mild morphologic features of cirrhosis; small focal hypodensity right hepatic lobe associated with capsular retraction not well evaluated on the current study; questionable wall thickening involving portions of stomach and ascending colon/transverse colon, nonspecific in the setting of ascites; moderate amount of ascites in the abdomen and pelvis; mesenteric edema; appendix is not well delineated or evaluated and appears to be obscured by ascites (no RLQ tenderness on exam); and cholelithiasis/sludge. Saline lock was established with labs drawn and results as above. Noted to have an elevated WBC count of 25.2 with is increased from yesterday (19.5). Noted to have a sodium of  128 and an acute kidney injury with BUN/Creatinine of 140/4.66. Remainder of blood counts, lactate, and clotting factors were unremarkable. Heart Score- 2 with normal EKG and troponin. At this time, we feel that the abdominal pain is atypical for acute coronary syndrome. We find no underlying evidence of an acute infectious process or pneumothorax on CXR. Clinically, we do not feel they are exhibiting signs of pulmonary embolism or thoracic aortic dissection (no connective tissue disorder, no tachycardia, tachypnea, hypoxia, and mediastinum normal in size on CXR). Noted to have an elevated lipase of 143 and elevated liver enzymes. Combined with the prior CT findings, an US of her gallbladder was ordered.              Your medication list        ASK your doctor about these medications        Instructions Last Dose Given Next Dose Due   ARIPiprazole 15 mg tablet  Commonly known as: Abilify           cholecalciferol 50 mcg (2,000 unit) capsule  Commonly known as: Vitamin D-3           divalproex 250 mg 24 hr tablet  Commonly known as: Depakote ER           fenofibrate micronized 134 mg capsule  Commonly known as: Lofibra      Take 1 capsule (134 mg) by mouth once daily with breakfast.       FLUoxetine 20 mg capsule  Commonly known as: PROzac           furosemide 40 mg tablet  Commonly known as: Lasix      Take 1.5 tablets (60 mg) by mouth once daily.       hepatitis B virus vacc.rec(PF) 20 mcg/mL syringe  Commonly known as: ENGERIX-B      Give a dose now and in 2 months       levothyroxine 25 mcg tablet  Commonly known as: Synthroid, Levoxyl      TAKE 1 TABLET BY MOUTH EVERY DAY       lovastatin 20 mg tablet  Commonly known as: Mevacor      TAKE 1 TABLET BY MOUTH EVERY DAY       spironolactone 50 mg tablet  Commonly known as: Aldactone      Take 1 tablet (50 mg) by mouth once daily.       zinc gluconate 50 mg tablet      Take 1 tablet (50 mg of elemental zinc) by mouth 2 times a day.                   Procedure  Procedures     LEI Steen-CHOCO  03/21/25 9013       LEI Steen-CNP  03/21/25 1500       LEI Steen-CHOCO  03/21/25 5595

## 2025-03-21 NOTE — ED PROCEDURE NOTE
Procedure  Paracentesis    Performed by: Harman Cavazos DO  Authorized by: Harman Cavazos DO    Consent:     Consent obtained:  Written    Consent given by:  Parent    Risks, benefits, and alternatives were discussed: yes      Risks discussed:  Bowel perforation and bleeding    Alternatives discussed:  No treatment  Universal protocol:     Procedure explained and questions answered to patient or proxy's satisfaction: yes      Site/side marked: yes      Immediately prior to procedure, a time out was called: yes      Patient identity confirmed:  Verbally with patient and arm band  Pre-procedure details:     Procedure purpose:  Diagnostic    Preparation: Patient was prepped and draped in usual sterile fashion    Anesthesia:     Anesthesia method:  Local infiltration    Local anesthetic:  Lidocaine 1% WITH epi  Procedure details:     Needle gauge:  18    Ultrasound guidance: yes      Puncture site:  R lower quadrant    Fluid removed amount:  1cc    Fluid appearance:  Ayleen    Dressing:  4x4 sterile gauze  Post-procedure details:     Procedure completion:  Tolerated well, no immediate complications               Harman Cavazos DO  03/21/25 1541

## 2025-03-22 LAB
ALBUMIN SERPL BCP-MCNC: 3.6 G/DL (ref 3.4–5)
ALP SERPL-CCNC: 90 U/L (ref 33–110)
ALT SERPL W P-5'-P-CCNC: 54 U/L (ref 7–45)
ANION GAP SERPL CALC-SCNC: 20 MMOL/L (ref 10–20)
AST SERPL W P-5'-P-CCNC: 97 U/L (ref 9–39)
BILIRUB DIRECT SERPL-MCNC: 1.2 MG/DL (ref 0–0.3)
BILIRUB SERPL-MCNC: 2.3 MG/DL (ref 0–1.2)
BUN SERPL-MCNC: 138 MG/DL (ref 6–23)
CALCIUM SERPL-MCNC: 9.6 MG/DL (ref 8.6–10.3)
CHLORIDE SERPL-SCNC: 95 MMOL/L (ref 98–107)
CO2 SERPL-SCNC: 23 MMOL/L (ref 21–32)
CREAT SERPL-MCNC: 4.04 MG/DL (ref 0.5–1.05)
EGFRCR SERPLBLD CKD-EPI 2021: 13 ML/MIN/1.73M*2
ERYTHROCYTE [DISTWIDTH] IN BLOOD BY AUTOMATED COUNT: 16.4 % (ref 11.5–14.5)
GLUCOSE SERPL-MCNC: 102 MG/DL (ref 74–99)
HCT VFR BLD AUTO: 26.4 % (ref 36–46)
HGB BLD-MCNC: 9.3 G/DL (ref 12–16)
HOLD SPECIMEN: NORMAL
MCH RBC QN AUTO: 35.1 PG (ref 26–34)
MCHC RBC AUTO-ENTMCNC: 35.2 G/DL (ref 32–36)
MCV RBC AUTO: 100 FL (ref 80–100)
NRBC BLD-RTO: 0 /100 WBCS (ref 0–0)
PLATELET # BLD AUTO: 262 X10*3/UL (ref 150–450)
POTASSIUM SERPL-SCNC: 5 MMOL/L (ref 3.5–5.3)
PROT SERPL-MCNC: 7.2 G/DL (ref 6.4–8.2)
RBC # BLD AUTO: 2.65 X10*6/UL (ref 4–5.2)
SODIUM SERPL-SCNC: 133 MMOL/L (ref 136–145)
WBC # BLD AUTO: 15.3 X10*3/UL (ref 4.4–11.3)

## 2025-03-22 PROCEDURE — 96366 THER/PROPH/DIAG IV INF ADDON: CPT

## 2025-03-22 PROCEDURE — 2500000004 HC RX 250 GENERAL PHARMACY W/ HCPCS (ALT 636 FOR OP/ED): Performed by: STUDENT IN AN ORGANIZED HEALTH CARE EDUCATION/TRAINING PROGRAM

## 2025-03-22 PROCEDURE — 96375 TX/PRO/DX INJ NEW DRUG ADDON: CPT

## 2025-03-22 PROCEDURE — 2500000004 HC RX 250 GENERAL PHARMACY W/ HCPCS (ALT 636 FOR OP/ED): Performed by: NURSE PRACTITIONER

## 2025-03-22 PROCEDURE — 84075 ASSAY ALKALINE PHOSPHATASE: CPT | Performed by: EMERGENCY MEDICINE

## 2025-03-22 PROCEDURE — P9047 ALBUMIN (HUMAN), 25%, 50ML: HCPCS | Performed by: STUDENT IN AN ORGANIZED HEALTH CARE EDUCATION/TRAINING PROGRAM

## 2025-03-22 PROCEDURE — 80053 COMPREHEN METABOLIC PANEL: CPT | Performed by: EMERGENCY MEDICINE

## 2025-03-22 PROCEDURE — 96367 TX/PROPH/DG ADDL SEQ IV INF: CPT

## 2025-03-22 PROCEDURE — 85027 COMPLETE CBC AUTOMATED: CPT | Performed by: EMERGENCY MEDICINE

## 2025-03-22 PROCEDURE — 96372 THER/PROPH/DIAG INJ SC/IM: CPT | Performed by: NURSE PRACTITIONER

## 2025-03-22 PROCEDURE — 36415 COLL VENOUS BLD VENIPUNCTURE: CPT | Performed by: EMERGENCY MEDICINE

## 2025-03-22 PROCEDURE — 99222 1ST HOSP IP/OBS MODERATE 55: CPT | Performed by: NURSE PRACTITIONER

## 2025-03-22 PROCEDURE — 2500000004 HC RX 250 GENERAL PHARMACY W/ HCPCS (ALT 636 FOR OP/ED): Performed by: EMERGENCY MEDICINE

## 2025-03-22 RX ORDER — LEVOTHYROXINE SODIUM 50 UG/1
25 TABLET ORAL DAILY
Status: DISCONTINUED | OUTPATIENT
Start: 2025-03-22 | End: 2025-03-25 | Stop reason: HOSPADM

## 2025-03-22 RX ORDER — FLUOXETINE HYDROCHLORIDE 20 MG/1
60 CAPSULE ORAL
Status: DISCONTINUED | OUTPATIENT
Start: 2025-03-22 | End: 2025-03-25 | Stop reason: HOSPADM

## 2025-03-22 RX ORDER — DIVALPROEX SODIUM 250 MG/1
500 TABLET, FILM COATED, EXTENDED RELEASE ORAL NIGHTLY
Status: DISCONTINUED | OUTPATIENT
Start: 2025-03-22 | End: 2025-03-25 | Stop reason: HOSPADM

## 2025-03-22 RX ORDER — HEPARIN SODIUM 5000 [USP'U]/ML
5000 INJECTION, SOLUTION INTRAVENOUS; SUBCUTANEOUS EVERY 8 HOURS SCHEDULED
Status: DISCONTINUED | OUTPATIENT
Start: 2025-03-22 | End: 2025-03-25 | Stop reason: HOSPADM

## 2025-03-22 RX ORDER — METRONIDAZOLE 500 MG/100ML
500 INJECTION, SOLUTION INTRAVENOUS EVERY 8 HOURS
Status: DISCONTINUED | OUTPATIENT
Start: 2025-03-22 | End: 2025-03-25 | Stop reason: HOSPADM

## 2025-03-22 RX ORDER — CEFTRIAXONE 2 G/50ML
2 INJECTION, SOLUTION INTRAVENOUS EVERY 24 HOURS
Status: DISCONTINUED | OUTPATIENT
Start: 2025-03-22 | End: 2025-03-25 | Stop reason: HOSPADM

## 2025-03-22 RX ADMIN — HYDROMORPHONE HYDROCHLORIDE 0.2 MG: 0.5 INJECTION, SOLUTION INTRAMUSCULAR; INTRAVENOUS; SUBCUTANEOUS at 10:29

## 2025-03-22 RX ADMIN — HEPARIN SODIUM 5000 UNITS: 5000 INJECTION, SOLUTION INTRAVENOUS; SUBCUTANEOUS at 13:49

## 2025-03-22 RX ADMIN — METRONIDAZOLE 500 MG: 500 INJECTION, SOLUTION INTRAVENOUS at 19:32

## 2025-03-22 RX ADMIN — ALBUMIN HUMAN 25 G: 0.25 SOLUTION INTRAVENOUS at 17:28

## 2025-03-22 RX ADMIN — CEFTRIAXONE 2 G: 2 INJECTION, SOLUTION INTRAVENOUS at 15:46

## 2025-03-22 RX ADMIN — HEPARIN SODIUM 5000 UNITS: 5000 INJECTION, SOLUTION INTRAVENOUS; SUBCUTANEOUS at 21:12

## 2025-03-22 RX ADMIN — METRONIDAZOLE 500 MG: 500 INJECTION, SOLUTION INTRAVENOUS at 10:29

## 2025-03-22 ASSESSMENT — ENCOUNTER SYMPTOMS
CHOKING: 0
FLANK PAIN: 0
TROUBLE SWALLOWING: 0
UNEXPECTED WEIGHT CHANGE: 0
POLYPHAGIA: 0
WEAKNESS: 0
APPETITE CHANGE: 0
PALPITATIONS: 0
COUGH: 0
NAUSEA: 1
DYSURIA: 0
SEIZURES: 0
APNEA: 0
MYALGIAS: 0
NECK STIFFNESS: 0
CHEST TIGHTNESS: 0
EYE ITCHING: 0
ARTHRALGIAS: 0
ABDOMINAL PAIN: 1
CHILLS: 0
ADENOPATHY: 0
WHEEZING: 0
LIGHT-HEADEDNESS: 0
SPEECH DIFFICULTY: 0
PHOTOPHOBIA: 0
VOICE CHANGE: 0
CONFUSION: 0
SINUS PAIN: 0
FREQUENCY: 0
EYE PAIN: 0
HEADACHES: 0
HEMATURIA: 0
HALLUCINATIONS: 0
NECK PAIN: 0
WOUND: 0
FATIGUE: 0
BRUISES/BLEEDS EASILY: 0
FEVER: 0
SORE THROAT: 0
SLEEP DISTURBANCE: 0
DYSPHORIC MOOD: 0
POLYDIPSIA: 0
TREMORS: 0
SHORTNESS OF BREATH: 0
ABDOMINAL DISTENTION: 0
DIZZINESS: 0
DIFFICULTY URINATING: 0
NERVOUS/ANXIOUS: 0
VOMITING: 1
COLOR CHANGE: 0
NUMBNESS: 0
DIARRHEA: 0
CONSTIPATION: 0
BLOOD IN STOOL: 0
BACK PAIN: 0
EYE DISCHARGE: 0

## 2025-03-22 NOTE — ED NOTES
(L) Transfer [facility] [place]  No Bed @0810     Liban Cobb RN  03/22/25 8690

## 2025-03-22 NOTE — SIGNIFICANT EVENT
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    Notable labs and imaging at outside hospital include hyponatremia to 128, DANIELLA concerning for hepatorenal syndrome with a creatinine of 4.66, baseline normal range, elevated INR to 1.5, lipase 143,  ALT 67.  CBC with notable leukocytosis to 25.2 with absolute neutrophil count of 22.11.  Chest x-ray without any acute cardiopulmonary process.  CT of the abdomen and pelvis with cirrhotic features of the liver.  Moderate amount of ascites in the abdomen and pelvis and mesenteric edema.  Ultrasound of the gallbladder showing gallbladder wall thickening with gallbladder sludge and a positive sonographic Shannon sign.  With constellation of findings concerning for superimposed cholecystitis.    Patient given 1 L NS bolus, ceftriaxone 2 g IVPB x 1, 25 g albumin IV x 2, 12.5 g albumin IV x 1.  Vitals at the time of this T36.3 HR 81 RR 18 BP 99/58 SpO2 98% on RA.    Hospitalist at [place] not amenable to admission given no GI/hepatology coverage currently.  Patient was discussed with hepatology at [facility] who agrees with transfer for management of decompensated cirrhosis and hepatorenal syndrome.  General surgery teleconference on patient regarding concern for acute cholecystitis and is requesting medical optimization before consideration of intervention.  At this time patient remains hemodynamically stable.  Sending facility encouraged to reach out to transfer center to reconference should there be any changes in patient's clinical status.

## 2025-03-22 NOTE — PROGRESS NOTES
Emergency Medicine Transition of Care Note.    I received Elizabeth Toledo in signout from Dr. Cavazos.  Please see the previous ED provider note for all HPI, PE and MDM up to the time of signout at 2100. This is in addition to the primary record.    In brief Elizabeth Toledo is an 49 y.o. female presenting for   Chief Complaint   Patient presents with    Abdominal Pain     Pt was sent in from her PCP for abnormal labs, pt reportedly cirrhosis and receives a paracentesis weekly her. Pt is also reporting intermittent abdominal pain.    Abnormal Labs     At the time of signout we were awaiting: [place] [facility]    ED Course as of 03/22/25 0658   Fri Mar 21, 2025   1742 Spoke with Dr. Baez of general surgery who recommends transfer to [facility] medical team due to patient's medical complexity. [RS]   2128 Spoke with Dr. Ontiveros of hepatology who does recommend pt be transferred to [facility]. [RS]   2233 Discussed with Dr. Perez, surgery. Given MELD of 32 and acute decompensation, she would need to be medically optimized before any surgical procedure would be safe or recommended. She would be happy to see the patient in consult.  [SP]   9625 Discussed with Dr. Jose Padilla, hospitalist at [facility]. Ultimately, the patient was accepted in transfer and will be waiting for bed. I will call back if there is any change in her condition.  [SP]      ED Course User Index  [RS] Harman Cavazos DO  [SP] Gretchen Castro DO         Diagnoses as of 03/22/25 0658   Cholecystitis   Hepatorenal syndrome (Multi)   Liver failure without hepatic coma, unspecified chronicity (Multi)       Medical Decision Making  Patient ultimately awaiting transfer to [facility] pending bed availability.  Morning labs were ordered.    Final diagnoses:   [K81.9] Cholecystitis   [K76.7] Hepatorenal syndrome (Multi)   [K72.90] Liver failure without hepatic coma, unspecified chronicity (Multi)           Procedure  Procedures    Gretchen Castro DO

## 2025-03-22 NOTE — CONSULTS
Inpatient consult to Medicine  Consult performed by: LEI Gonzalez-CNP  Consult ordered by: Constance Tovar MD  Reason for consult: medical management              History Of Present Illness:  Elizabeth Toledo is a 49 y.o. female with PMHx s/f liver cirrhosis, hepato renal syndrome, hypothyroidism, Rogelio syndrome, disruptive mood disorder came to the hospital with complaints of diffuse abdominal pain and reports of leukocytosis and outpatient lab work.  Lab studies demonstrated a markedly elevated BUN/creatinine suggesting DANIELLA and a reoccurrence of hepatorenal syndrome as well as hyponatremia and coagulopathy with increased liver enzymes as well.  There is significant leukocytosis with white blood cell count 25.2 the CT of the abdomen and pelvis shows liver cirrhosis and ascites as well as mild mesenteric edema ultrasound of the right upper quadrant shows the gallbladder wall to be thickened and with some positive Shannon signs.  Patient was started on IV Rocephin the emergency department [place] [facility] [place] because there is no GI coverage here and due to the complexity of her liver pathology.  Unfortunately no bed is available at this time inpatient medicine was consulted to assist with medical management in the interim.  The patient was given boluses of albumin to help support her pressure.  Presently she is seen in the emergency department she is denying any fevers chills or rigors she does admit to some nausea 1 episode of vomiting.  She does complain of diffuse abdominal pain and increased abdominal girth.  Patient does not have any lower extremity edema is not having any diarrhea.  ED Course:  ED Course as of 03/22/25 1015   Fri Mar 21, 2025   2422 Spoke with Dr. Baez of general surgery who recommends [place] [facility] [place] due to patient's medical complexity. [RS]   1008 Spoke with Dr. Ontiveros of hepatology who does recommend pt be transferred to [facility]. [RS]   0000  Discussed with Dr. Perez, surgery. Given MELD of 32 and acute decompensation, she would need to be medically optimized before any surgical procedure would be safe or recommended. She would be happy to see the patient in consult.  [SP]   1648 Discussed with Dr. Jose Padilla, hospitalist at [facility]. Ultimately, the patient was accepted in transfer and will be waiting for bed. I will call back if there is any change in her condition.  [SP]      ED Course User Index  [RS] Harman Cavazos DO  [SP] Gretchen Castro DO         Diagnoses as of 03/22/25 1015   Cholecystitis   Hepatorenal syndrome (Multi)   Liver failure without hepatic coma, unspecified chronicity (Multi)     Relevant Results  Results for orders placed or performed during the hospital encounter of 03/21/25 (from the past 24 hours)   ECG 12 lead   Result Value Ref Range    Ventricular Rate 83 BPM    Atrial Rate 83 BPM    MI Interval 128 ms    QRS Duration 95 ms    QT Interval 412 ms    QTC Calculation(Bazett) 485 ms    P Axis 64 degrees    R Axis 16 degrees    T Axis 25 degrees    QRS Count 13 beats    Q Onset 253 ms    T Offset 459 ms    QTC Fredericia 459 ms   CBC and Auto Differential   Result Value Ref Range    WBC 25.2 (H) 4.4 - 11.3 x10*3/uL    nRBC 0.0 0.0 - 0.0 /100 WBCs    RBC 3.48 (L) 4.00 - 5.20 x10*6/uL    Hemoglobin 12.2 12.0 - 16.0 g/dL    Hematocrit 34.5 (L) 36.0 - 46.0 %    MCV 99 80 - 100 fL    MCH 35.1 (H) 26.0 - 34.0 pg    MCHC 35.4 32.0 - 36.0 g/dL    RDW 16.1 (H) 11.5 - 14.5 %    Platelets 352 150 - 450 x10*3/uL    Neutrophils % 87.6 40.0 - 80.0 %    Immature Granulocytes %, Automated 1.2 (H) 0.0 - 0.9 %    Lymphocytes % 5.4 13.0 - 44.0 %    Monocytes % 5.3 2.0 - 10.0 %    Eosinophils % 0.2 0.0 - 6.0 %    Basophils % 0.3 0.0 - 2.0 %    Neutrophils Absolute 22.11 (H) 1.20 - 7.70 x10*3/uL    Immature Granulocytes Absolute, Automated 0.31 0.00 - 0.70 x10*3/uL    Lymphocytes Absolute 1.35 1.20 - 4.80 x10*3/uL    Monocytes Absolute 1.33 (H) 0.10 - 1.00  x10*3/uL    Eosinophils Absolute 0.05 0.00 - 0.70 x10*3/uL    Basophils Absolute 0.08 0.00 - 0.10 x10*3/uL   Lactate   Result Value Ref Range    Lactate 1.8 0.4 - 2.0 mmol/L   Comprehensive metabolic panel   Result Value Ref Range    Glucose 137 (H) 74 - 99 mg/dL    Sodium 128 (L) 136 - 145 mmol/L    Potassium 5.2 3.5 - 5.3 mmol/L    Chloride 92 (L) 98 - 107 mmol/L    Bicarbonate 22 21 - 32 mmol/L    Anion Gap 19 10 - 20 mmol/L    Urea Nitrogen 140 (HH) 6 - 23 mg/dL    Creatinine 4.66 (H) 0.50 - 1.05 mg/dL    eGFR 11 (L) >60 mL/min/1.73m*2    Calcium 9.7 8.6 - 10.3 mg/dL    Albumin 3.1 (L) 3.4 - 5.0 g/dL    Alkaline Phosphatase 99 33 - 110 U/L    Total Protein 7.2 6.4 - 8.2 g/dL     (H) 9 - 39 U/L    Bilirubin, Total 3.0 (H) 0.0 - 1.2 mg/dL    ALT 67 (H) 7 - 45 U/L   Lipase   Result Value Ref Range    Lipase 143 (H) 9 - 82 U/L   Protime-INR   Result Value Ref Range    Protime 16.5 (H) 9.8 - 12.4 seconds    INR 1.5 (H) 0.9 - 1.1   Troponin I, High Sensitivity, Initial   Result Value Ref Range    Troponin I, High Sensitivity 8 0 - 13 ng/L   Troponin, High Sensitivity, 1 Hour   Result Value Ref Range    Troponin I, High Sensitivity 8 0 - 13 ng/L   Troponin, High Sensitivity, 1 Hour   Result Value Ref Range    Troponin I, High Sensitivity 8 0 - 13 ng/L   Blood Culture    Specimen: Peripheral Venipuncture; Blood culture   Result Value Ref Range    Blood Culture Loaded on Instrument - Culture in progress    Blood Culture    Specimen: Peripheral Venipuncture; Blood culture   Result Value Ref Range    Blood Culture Loaded on Instrument - Culture in progress    Urinalysis with Reflex Culture and Microscopic   Result Value Ref Range    Color, Urine Yellow Light-Yellow, Yellow, Dark-Yellow    Appearance, Urine Clear Clear    Specific Gravity, Urine 1.014 1.005 - 1.035    pH, Urine 5.0 5.0, 5.5, 6.0, 6.5, 7.0, 7.5, 8.0    Protein, Urine NEGATIVE NEGATIVE, 10 (TRACE), 20 (TRACE) mg/dL    Glucose, Urine Normal Normal mg/dL     Blood, Urine NEGATIVE NEGATIVE mg/dL    Ketones, Urine NEGATIVE NEGATIVE mg/dL    Bilirubin, Urine NEGATIVE NEGATIVE mg/dL    Urobilinogen, Urine Normal Normal mg/dL    Nitrite, Urine NEGATIVE NEGATIVE    Leukocyte Esterase, Urine 250 Daphne/uL (A) NEGATIVE   Extra Urine Gray Tube   Result Value Ref Range    Extra Tube Hold for add-ons.    Microscopic Only, Urine   Result Value Ref Range    WBC, Urine 1-5 1-5, NONE /HPF    RBC, Urine 1-2 NONE, 1-2, 3-5 /HPF    Squamous Epithelial Cells, Urine 1-9 (SPARSE) Reference range not established. /HPF    Mucus, Urine FEW Reference range not established. /LPF    Hyaline Casts, Urine 3+ (A) NONE /LPF   CBC   Result Value Ref Range    WBC 15.3 (H) 4.4 - 11.3 x10*3/uL    nRBC 0.0 0.0 - 0.0 /100 WBCs    RBC 2.65 (L) 4.00 - 5.20 x10*6/uL    Hemoglobin 9.3 (L) 12.0 - 16.0 g/dL    Hematocrit 26.4 (L) 36.0 - 46.0 %     80 - 100 fL    MCH 35.1 (H) 26.0 - 34.0 pg    MCHC 35.2 32.0 - 36.0 g/dL    RDW 16.4 (H) 11.5 - 14.5 %    Platelets 262 150 - 450 x10*3/uL   Basic metabolic panel   Result Value Ref Range    Glucose 102 (H) 74 - 99 mg/dL    Sodium 133 (L) 136 - 145 mmol/L    Potassium 5.0 3.5 - 5.3 mmol/L    Chloride 95 (L) 98 - 107 mmol/L    Bicarbonate 23 21 - 32 mmol/L    Anion Gap 20 10 - 20 mmol/L    Urea Nitrogen 138 (HH) 6 - 23 mg/dL    Creatinine 4.04 (H) 0.50 - 1.05 mg/dL    eGFR 13 (L) >60 mL/min/1.73m*2    Calcium 9.6 8.6 - 10.3 mg/dL   Hepatic function panel   Result Value Ref Range    Albumin 3.6 3.4 - 5.0 g/dL    Bilirubin, Total 2.3 (H) 0.0 - 1.2 mg/dL    Bilirubin, Direct 1.2 (H) 0.0 - 0.3 mg/dL    Alkaline Phosphatase 90 33 - 110 U/L    ALT 54 (H) 7 - 45 U/L    AST 97 (H) 9 - 39 U/L    Total Protein 7.2 6.4 - 8.2 g/dL      US gallbladder    Result Date: 3/21/2025  Interpreted By:  Jerry Donald, STUDY: US GALLBLADDER;  3/21/2025 4:58 pm   INDICATION: Signs/Symptoms:Abdominal pain with elevated LFT's and lipase.  All   COMPARISON: None.   ACCESSION NUMBER(S):  [id number]   ORDERING CLINICIAN: QIANA SANTA   TECHNIQUE: Multiple images of the right upper quadrant were obtained.   FINDINGS: LIVER: The liver measures 19.7 cm coarsened hepatic echotexture is noted. No discrete lesions are detected in the liver.     GALLBLADDER: No obvious cholelithiasis, however there appears to be some gallbladder sludge. The gallbladder wall thickness is 0.5 cm. Sonographic Shannon's sign is positive.     BILE DUCTS: No evidence of intra or extrahepatic biliary dilatation is identified; the common bile duct measures 0.2 cm.   PANCREAS: The pancreas is poorly visualized due to overlying bowel gas.   RIGHT KIDNEY: The right kidney measures 9.5 cm in length. The renal cortical echogenicity appears unremarkable.  No hydronephrosis. No renal calculi are detected.   Moderate ascites.       Hepatomegaly with cirrhotic morphology liver. Associated moderate volume ascites.   Gallbladder wall thickening with gallbladder sludge and a positive sonographic Shannon sign reported by sonographer. Constellation of findings warrants clinical exclusion of superimposed cholecystitis. Consider gallbladder nuclear medicine scintigraphy for further assessment.   MACRO: None     Signed by: Jerry Donald 3/21/2025 5:16 PM Dictation workstation:   [id number]    ECG 12 lead    Result Date: 3/21/2025  Sinus rhythm Abnormal R-wave progression, early transition LVH with secondary repolarization abnormality Baseline wander in lead(s) V1,V3,V5 See ED provider note for full interpretation and clinical correlation Confirmed by Jaky Singh ([id number]) on 3/21/2025 1:44:46 PM    XR chest 2 views    Result Date: 3/21/2025  Interpreted By:  Lesley Benjamin, STUDY: XR CHEST 2 VIEWS 3/21/2025 1:16 pm   INDICATION: Signs/Symptoms:leukocytosis   COMPARISON: None available.   ACCESSION NUMBER(S): [id number]   ORDERING CLINICIAN: QIANA SANTA   TECHNIQUE: AP erect and lateral views of the chest   FINDINGS: Normal heart,  mediastinum, and lungs. There is spondylotic spurring of the thoracic endplates.       No acute cardiopulmonary disease.   Signed by: Lesley Benjamin 3/21/2025 1:37 PM Dictation workstation:   [id number]    CT abdomen pelvis wo IV contrast    Result Date: 3/18/2025  Patient Name: ASHU JOHNSTON : 1975 [id number] Accession: [id number] Exam Date/Time: 2025 22:12 Procedure: CT ABDOMEN PELVIS WO IV CONTRAST Ordering Provider: MILLS AUSTIN Reason For Exam: abd distension and vomiting CT ABDOMEN AND PELVIS WITHOUT IV CONTRAST CLINICAL INDICATION: abd distension and vomiting TECHNIQUE: Axial CT images through the through the abdomen and pelvis with 3 mm reconstruction without intravenous intravenous contrast media. Enteric contrast was not administered. Coronal and sagittal reconstructions included. Dose reduction was employed with automated exposure control. COMPARISON:  2019 FINDINGS: Examination is somewhat limited in evaluation of solid organs and vascular structures due to the lack of intravenous contrast. Additional limitations:  Somewhat limited evaluation of the GI tract without enteric contrast.   Patient motion and streak artifact Lung base: Mild patchy bibasilar opacities. Liver: Liver is heterogeneous with enlargement of the right hepatic lobe. Subtle contour nodularity. Punctate calcific density right hepatic lobe with capsular retraction with small focal hypodensity. Gallbladder/Biliary tree:  Cholelithiasis/sludge. No intra or extrahepatic biliary ductal dilatation Spleen: No significant abnormality detected.   Pancreas: Homogeneous without adjacent stranding or other significant abnormality. Adrenals: No discrete mass or nodule detected. Kidneys/pelvic organs: No obstructive uropathy.  No nephrolithiasis. Urinary bladder is fairly collapsed limiting evaluation.  No significant abnormality in the uterus GI tract: Questionable wall thickening involving portions  of stomach. No dilated loops of small bowel. Appendix is not well delineated, likely obscured by ascites. Slight apparent wall thickening involving portions of ascending colon and transverse colon. Peritoneal cavity/retroperitoneum: No pneumatosis or pneumoperitoneum. Moderate amount of ascites in the abdomen and pelvis. Mesenteric edema. Prominent periportal lymph nodes. Vasculature: Normal caliber abdominal aorta. Osseous structures/soft tissues: Mild degenerative spondylosis in the visualized spine. Degenerative changes in the sacroiliac joints. No significant soft tissue abnormality detected    1.  Mild morphologic features of cirrhosis. Small focal hypodensity right hepatic lobe associated with capsular retraction not well evaluated on the current study. Consider follow-up with nonemergent MRI when patient is able to tolerate. 2.  Questionable wall thickening involving portions of stomach and ascending colon/transverse colon, nonspecific in the setting of ascites. 3.  Moderate amount of ascites in the abdomen and pelvis. Mesenteric edema. 4.  Appendix is not well delineated or evaluated and appears to be obscured by ascites. 5.  Cholelithiasis/sludge. Report Dictated on Workstation: [id number]  Electronically Signed By: Wm Trent MD  Electronically Signed Date/Time: 3/18/2025 11:10 PM EDT     US guided abdominal paracentesis    Result Date: 3/17/2025  Interpreted By:  Adan Dawkins, STUDY: US GUIDED ABDOMINAL PARACENTESIS;  3/17/2025 12:47 pm   INDICATION: Signs/Symptoms:ascites.   ,R18.8 Other ascites   COMPARISON: None.   ACCESSION NUMBER(S): [id number]   ORDERING CLINICIAN: TIERA LEAVITT   TECHNIQUE:   CONSENT: The patient/patient's POA/next of kin was informed of the nature of the proposed procedure. The purposes, alternatives, risks, and benefits were explained and discussed. All questions were answered and consent was obtained.   SEDATION: None   MEDICATION/CONTRAST: No additional   TIME OUT:  A time out was performed immediately prior to procedure start with the interventional team, correctly identifying the patient name, date of birth, MRN, procedure, anatomy (including marking of site and side), patient position, procedure consent form, relevant laboratory and imaging test results, antibiotic administration, safety precautions, and procedure-specific equipment needs.   COMPLICATIONS: No immediate adverse events identified.   FINDINGS: The patient was placed in the supine position.   Screening ultrasound of the abdomen demonstrated a  large amount of free fluid.  The skin was marked in the region of the  right hemiabdomen.  Next, the skin was prepped and draped in the usual sterile fashion.  Following this, one percent lidocaine was used to anesthetize the skin and subcutaneous tissues.  Finally, a 5 Serbian Yueh catheter was placed into the peritoneal cavity without difficulty. Approximately 4300 mL of yellowish colored fluid was collected.  The catheter was removed.   Patient tolerated the procedure with no immediate complications. Albumin administered.       Uneventful ultrasound guided paracentesis, as detailed above.     Performed and dictated at [facility].   MACRO: None   Signed by: Adan Dawkins 3/17/2025 1:44 PM Dictation workstation:   [id number]    US guided abdominal paracentesis    Result Date: 3/10/2025  Interpreted By:  Adan Dawkins, STUDY: US GUIDED ABDOMINAL PARACENTESIS;  3/10/2025 11:40 am   INDICATION: Signs/Symptoms:ascites.   ,R18.8 Other ascites   COMPARISON: None.   ACCESSION NUMBER(S): [id number]   ORDERING CLINICIAN: TIERA LEAVITT   TECHNIQUE:   CONSENT: The patient/patient's POA/next of kin was informed of the nature of the proposed procedure. The purposes, alternatives, risks, and benefits were explained and discussed. All questions were answered and consent was obtained.   SEDATION: None   MEDICATION/CONTRAST: No additional   TIME OUT: A  time out was performed immediately prior to procedure start with the interventional team, correctly identifying the patient name, date of birth, MRN, procedure, anatomy (including marking of site and side), patient position, procedure consent form, relevant laboratory and imaging test results, antibiotic administration, safety precautions, and procedure-specific equipment needs.   COMPLICATIONS: No immediate adverse events identified.   FINDINGS: The patient was placed in the supine position.   Screening ultrasound of the abdomen demonstrated a  large amount of free fluid.  The skin was marked in the region of the  right hemiabdomen.  Next, the skin was prepped and draped in the usual sterile fashion.  Following this, one percent lidocaine was used to anesthetize the skin and subcutaneous tissues.  Finally, a 5 Martiniquais Yueh catheter was placed into the peritoneal cavity without difficulty. Approximately 5600 mL of yellowish colored fluid was collected.  The catheter was removed.   Patient tolerated the procedure with no immediate complications. Albumin administered.       Uneventful ultrasound guided paracentesis, as detailed above.     Performed and dictated at [facility].   MACRO: None   Signed by: Adan Dawkins 3/10/2025 12:24 PM Dictation workstation:   [id number]    US guided abdominal paracentesis    Result Date: 2/27/2025  Interpreted By:  Adan Dawkins, STUDY: US GUIDED ABDOMINAL PARACENTESIS;  2/27/2025 12:05 pm   INDICATION: Signs/Symptoms:ASCITES.   ,R18.8 Other ascites   COMPARISON: None.   ACCESSION NUMBER(S): [id number]   ORDERING CLINICIAN: TIERA LEAVITT   TECHNIQUE:   CONSENT: The patient/patient's POA/next of kin was informed of the nature of the proposed procedure. The purposes, alternatives, risks, and benefits were explained and discussed. All questions were answered and consent was obtained.   SEDATION: None   MEDICATION/CONTRAST: No additional   TIME OUT: A  time out was performed immediately prior to procedure start with the interventional team, correctly identifying the patient name, date of birth, MRN, procedure, anatomy (including marking of site and side), patient position, procedure consent form, relevant laboratory and imaging test results, antibiotic administration, safety precautions, and procedure-specific equipment needs.   COMPLICATIONS: No immediate adverse events identified.   FINDINGS: The patient was placed in the supine position.   Screening ultrasound of the abdomen demonstrated a  large amount of free fluid.  The skin was marked in the region of the  right hemiabdomen.  Next, the skin was prepped and draped in the usual sterile fashion.  Following this, one percent lidocaine was used to anesthetize the skin and subcutaneous tissues.  Finally, a 5 Haitian Yueh catheter was placed into the peritoneal cavity without difficulty. Approximately 5700 mL of yellowish colored fluid was collected.  The catheter was removed.   Patient tolerated the procedure with no immediate complications. Albumin administered.       Uneventful ultrasound guided paracentesis, as detailed above.     Performed and dictated at [facility].   MACRO: None   Signed by: Adan Dawkins 2/27/2025 12:16 PM Dictation workstation:   [id number]     Scheduled medications:  albumin human, 25 g, intravenous, Once  cefTRIAXone, 2 g, intravenous, q24h  HYDROmorphone, 0.2 mg, intravenous, Once  metroNIDAZOLE, 500 mg, intravenous, q8h      Continuous medications:     PRN medications:        Past Medical History  She has a past medical history of Cirrhosis (Multi), Disruptive mood dysregulation disorder (Multi), Hyperlipidemia, Hypothyroidism, and Rogelio syndrome (HHS-HCC).    Surgical History  She has a past surgical history that includes US guided needle liver biopsy (8/2/2023).     Social History  She reports that she has never smoked. She has never been  exposed to tobacco smoke. She has never used smokeless tobacco. She reports that she does not drink alcohol and does not use drugs.    Family History  Family History   Problem Relation Name Age of Onset    Diabetes Mother      Other (prediabetes) Father          Allergies  Patient has no known allergies.    Code Status  No Order     Review of Systems   Constitutional:  Negative for appetite change, chills, fatigue, fever and unexpected weight change.   HENT:  Negative for congestion, ear discharge, ear pain, mouth sores, nosebleeds, sinus pain, sore throat, trouble swallowing and voice change.    Eyes:  Negative for photophobia, pain, discharge, itching and visual disturbance.   Respiratory:  Negative for apnea, cough, choking, chest tightness, shortness of breath and wheezing.    Cardiovascular:  Negative for chest pain, palpitations and leg swelling.   Gastrointestinal:  Positive for abdominal pain, nausea and vomiting. Negative for abdominal distention, blood in stool, constipation and diarrhea.   Endocrine: Negative for cold intolerance, heat intolerance, polydipsia, polyphagia and polyuria.   Genitourinary:  Negative for decreased urine volume, difficulty urinating, dysuria, flank pain, frequency, hematuria and urgency.   Musculoskeletal:  Negative for arthralgias, back pain, gait problem, myalgias, neck pain and neck stiffness.   Skin:  Negative for color change, pallor and wound.   Allergic/Immunologic: Negative for food allergies and immunocompromised state.   Neurological:  Negative for dizziness, tremors, seizures, syncope, speech difficulty, weakness, light-headedness, numbness and headaches.   Hematological:  Negative for adenopathy. Does not bruise/bleed easily.   Psychiatric/Behavioral:  Negative for confusion, dysphoric mood, hallucinations, sleep disturbance and suicidal ideas. The patient is not nervous/anxious.        Last Recorded Vitals  /57   Pulse 83   Temp 36.3 °C (97.3 °F) (Temporal)    Resp 18   Wt (!) 40.8 kg (90 lb)   SpO2 98%      Physical Exam  Vitals reviewed.   Constitutional:       General: She is not in acute distress.  HENT:      Head: Normocephalic and atraumatic.      Right Ear: External ear normal.      Left Ear: External ear normal.      Nose: Nose normal.      Mouth/Throat:      Mouth: Mucous membranes are moist.      Pharynx: Oropharynx is clear.   Eyes:      Extraocular Movements: Extraocular movements intact.      Conjunctiva/sclera: Conjunctivae normal.      Pupils: Pupils are equal, round, and reactive to light.   Cardiovascular:      Rate and Rhythm: Normal rate and regular rhythm.      Pulses: Normal pulses.      Heart sounds: Normal heart sounds.   Pulmonary:      Effort: Pulmonary effort is normal. No respiratory distress.      Breath sounds: Normal breath sounds. No wheezing, rhonchi or rales.   Chest:      Chest wall: No tenderness.   Abdominal:      General: Bowel sounds are normal. There is distension.      Palpations: Abdomen is soft. There is no mass.      Tenderness: There is abdominal tenderness. There is no rebound.   Musculoskeletal:         General: No swelling or deformity. Normal range of motion.      Cervical back: Normal range of motion.   Skin:     General: Skin is warm and dry.      Capillary Refill: Capillary refill takes less than 2 seconds.   Neurological:      General: No focal deficit present.      Mental Status: She is alert and oriented to person, place, and time.   Psychiatric:         Mood and Affect: Mood normal.         Behavior: Behavior normal.         Assessment/Plan   Assessment & Plan    Abdominal pain suspected cholecystitis with severe chronic cirrhosis and ascites with mesenteric edema, sepsis w DANIELLA  Patient was started on Rocephin will add Flagyl for coverage  We will hold any hepatotoxic medications for now  Plan is to have the patient [place] [facility] [place]    Probable acute kidney injury secondary to  hepatorenal syndrome more but in addition to  sepsis  Patient given albumin for pressure support  Will avoid nephrotoxins  Consider additional albumin or normal saline bolus for pressure support as needed    Anemia  Start GI prophylaxis    Hypothyroidism  Continue her levothyroxine    Disruptive mood disorder  Continue her Depakote and Abilify to avoid withdrawal symptoms    Dyslipidemia  Hold her medications given the acuity of her cirrhosis    VTE prophylaxis  Subcutaneous heparin  Monitor platelets closely    Anemia  No reports of acute bleeding, s/p volume expansion    No new Assessment & Plan notes have been filed under this hospital service since the last note was generated.  Service: Internal Medicine          Mohinder Moreau, APRN-CNP    Dragon dictation software was used to dictate this note and thus there may be minor errors in translation/transcription including garbled speech or misspellings. Please contact for clarification if needed.

## 2025-03-22 NOTE — PROGRESS NOTES
Emergency Medicine Transition of Care Note.    I received Elizabeth Toledo in signout from Dr. Tovar.  Please see the previous ED provider note for all HPI, PE and MDM up to the time of signout at 1600. This is in addition to the primary record.    In brief Elizabeth Toledo is an 49 y.o. female presenting for   Chief Complaint   Patient presents with    Abdominal Pain     Pt was sent in from her PCP for abnormal labs, pt reportedly cirrhosis and receives a paracentesis weekly her. Pt is also reporting intermittent abdominal pain.    Abnormal Labs     At the time of signout we were awaiting: Transfer to [facility].    ED Course as of 03/22/25 1608   Fri Mar 21, 2025   1742 Spoke with Dr. Baez of general surgery who recommends transfer [place] [facility] [place] team due to patient's medical complexity. [RS]   2128 Spoke with Dr. Ontiveros of hepatology who does recommend pt be transferred to [facility]. [RS]   2233 Discussed with Dr. Perez, surgery. Given MELD of 32 and acute decompensation, she would need to be medically optimized before any surgical procedure would be safe or recommended. She would be happy to see the patient in consult.  [SP]   225 Discussed with Dr. Jose Padilla, hospitalist at [facility]. Ultimately, the patient was accepted in transfer and will be waiting for bed. I will call back if there is any change in her condition.  [SP]      ED Course User Index  [RS] Harman Cavazos DO  [SP] Gretchen Castro DO         Diagnoses as of 03/22/25 1608   Cholecystitis   Hepatorenal syndrome (Multi)   Liver failure without hepatic coma, unspecified chronicity (Multi)       Medical Decision Making  Patient is currently being medically managed in the emergency department and by [facility].  She is pending transfer at this time.  Patient is currently hemodynamically stable.  Patient is observed throughout the day in the emergency department.    Final diagnoses:   [K81.9] Cholecystitis   [K76.7] Hepatorenal syndrome  (Multi)   [K72.90] Liver failure without hepatic coma, unspecified chronicity (Multi)           Procedure  Procedures    Danelle Naidu MD

## 2025-03-22 NOTE — PROGRESS NOTES
I have accept care of this patient in signout.    In summary:  I received this patient in signout in summary this is a 49-year-old female with a past medical history of cirrhosis who is following with hepatology presents to the emergency department for abdominal pain.  Here she was found to have hepatorenal syndrome with a worsening creatinine.  Blood pressures are usually low in the 90s to 100s according to prior providers.  She was also found to have concerns for possible acute cholecystitis.  [place] speaking [place] [facility] [place] that she would just be treated with antibiotics which are now scheduled.  She was ultimately admitted for transfer.  Given that she has a complex patient and there is no bed at this time.  IMS was consulted no issues during my shift.      Labs Reviewed   CBC WITH AUTO DIFFERENTIAL - Abnormal       Result Value    WBC 25.2 (*)     nRBC 0.0      RBC 3.48 (*)     Hemoglobin 12.2      Hematocrit 34.5 (*)     MCV 99      MCH 35.1 (*)     MCHC 35.4      RDW 16.1 (*)     Platelets 352      Neutrophils % 87.6      Immature Granulocytes %, Automated 1.2 (*)     Lymphocytes % 5.4      Monocytes % 5.3      Eosinophils % 0.2      Basophils % 0.3      Neutrophils Absolute 22.11 (*)     Immature Granulocytes Absolute, Automated 0.31      Lymphocytes Absolute 1.35      Monocytes Absolute 1.33 (*)     Eosinophils Absolute 0.05      Basophils Absolute 0.08     COMPREHENSIVE METABOLIC PANEL - Abnormal    Glucose 137 (*)     Sodium 128 (*)     Potassium 5.2      Chloride 92 (*)     Bicarbonate 22      Anion Gap 19      Urea Nitrogen 140 (*)     Creatinine 4.66 (*)     eGFR 11 (*)     Calcium 9.7      Albumin 3.1 (*)     Alkaline Phosphatase 99      Total Protein 7.2       (*)     Bilirubin, Total 3.0 (*)     ALT 67 (*)    LIPASE - Abnormal    Lipase 143 (*)     Narrative:     Venipuncture immediately after or during the administration of Metamizole may lead to falsely low results. Testing  should be performed immediately prior to Metamizole dosing.   PROTIME-INR - Abnormal    Protime 16.5 (*)     INR 1.5 (*)    URINALYSIS WITH REFLEX CULTURE AND MICROSCOPIC - Abnormal    Color, Urine Yellow      Appearance, Urine Clear      Specific Gravity, Urine 1.014      pH, Urine 5.0      Protein, Urine NEGATIVE      Glucose, Urine Normal      Blood, Urine NEGATIVE      Ketones, Urine NEGATIVE      Bilirubin, Urine NEGATIVE      Urobilinogen, Urine Normal      Nitrite, Urine NEGATIVE      Leukocyte Esterase, Urine 250 Daphne/uL (*)    MICROSCOPIC ONLY, URINE - Abnormal    WBC, Urine 1-5      RBC, Urine 1-2      Squamous Epithelial Cells, Urine 1-9 (SPARSE)      Mucus, Urine FEW      Hyaline Casts, Urine 3+ (*)    CBC - Abnormal    WBC 15.3 (*)     nRBC 0.0      RBC 2.65 (*)     Hemoglobin 9.3 (*)     Hematocrit 26.4 (*)           MCH 35.1 (*)     MCHC 35.2      RDW 16.4 (*)     Platelets 262     BLOOD CULTURE - Normal    Blood Culture Loaded on Instrument - Culture in progress     BLOOD CULTURE - Normal    Blood Culture Loaded on Instrument - Culture in progress     LACTATE - Normal    Lactate 1.8      Narrative:     Venipuncture immediately after or during the administration of Metamizole may lead to falsely low results. Testing should be performed immediately prior to Metamizole dosing.   SERIAL TROPONIN-INITIAL - Normal    Troponin I, High Sensitivity 8      Narrative:     Less than 99th percentile of normal range cutoff-  Female and children under 18 years old <14 ng/L; Male <21 ng/L: Negative  Repeat testing should be performed if clinically indicated.     Female and children under 18 years old 14-50 ng/L; Male 21-50 ng/L:  Consistent with possible cardiac damage and possible increased clinical   risk. Serial measurements may help to assess extent of myocardial damage.     >50 ng/L: Consistent with cardiac damage, increased clinical risk and  myocardial infarction. Serial measurements may help assess  extent of   myocardial damage.      NOTE: Children less than 1 year old may have higher baseline troponin   levels and results should be interpreted in conjunction with the overall   clinical context.     NOTE: Troponin I testing is performed using a different   testing methodology at [facility] than at other   [facility]. Direct result comparisons should only   be made within the same method.   SERIAL TROPONIN, 1 HOUR - Normal    Troponin I, High Sensitivity 8      Narrative:     Less than 99th percentile of normal range cutoff-  Female and children under 18 years old <14 ng/L; Male <21 ng/L: Negative  Repeat testing should be performed if clinically indicated.     Female and children under 18 years old 14-50 ng/L; Male 21-50 ng/L:  Consistent with possible cardiac damage and possible increased clinical   risk. Serial measurements may help to assess extent of myocardial damage.     >50 ng/L: Consistent with cardiac damage, increased clinical risk and  myocardial infarction. Serial measurements may help assess extent of   myocardial damage.      NOTE: Children less than 1 year old may have higher baseline troponin   levels and results should be interpreted in conjunction with the overall   clinical context.     NOTE: Troponin I testing is performed using a different   testing methodology at [facility] than at other   [facility]. Direct result comparisons should only   be made within the same method.   SERIAL TROPONIN, 1 HOUR - Normal    Troponin I, High Sensitivity 8      Narrative:     Less than 99th percentile of normal range cutoff-  Female and children under 18 years old <14 ng/L; Male <21 ng/L: Negative  Repeat testing should be performed if clinically indicated.     Female and children under 18 years old 14-50 ng/L; Male 21-50 ng/L:  Consistent with possible cardiac damage and possible increased clinical   risk. Serial measurements may help to assess extent of myocardial  damage.     >50 ng/L: Consistent with cardiac damage, increased clinical risk and  myocardial infarction. Serial measurements may help assess extent of   myocardial damage.      NOTE: Children less than 1 year old may have higher baseline troponin   levels and results should be interpreted in conjunction with the overall   clinical context.     NOTE: Troponin I testing is performed using a different   testing methodology at [facility] than at other   [facility]. Direct result comparisons should only   be made within the same method.   URINE CULTURE   TROPONIN SERIES- (INITIAL, 1 HR)    Narrative:     The following orders were created for panel order Troponin I Series, High Sensitivity (0, 1 HR).  Procedure                               Abnormality         Status                     ---------                               -----------         ------                     Troponin I, High Sensiti...[[id number]]  Normal              Final result               Troponin, High Sensitivi...[[id number]]  Normal              Final result                 Please view results for these tests on the individual orders.   URINALYSIS WITH REFLEX CULTURE AND MICROSCOPIC    Narrative:     The following orders were created for panel order Urinalysis with Reflex Culture and Microscopic.  Procedure                               Abnormality         Status                     ---------                               -----------         ------                     Urinalysis with Reflex C...[[id number]]  Abnormal            Final result               Extra Urine Gray Tube[[id number]]                            Final result                 Please view results for these tests on the individual orders.   EXTRA URINE GRAY TUBE    Extra Tube Hold for add-ons.     BASIC METABOLIC PANEL   HEPATIC FUNCTION PANEL     US gallbladder   Final Result   Hepatomegaly with cirrhotic morphology liver. Associated moderate   volume ascites.         Gallbladder wall thickening with gallbladder sludge and a positive   sonographic Shannon sign reported by sonographer. Constellation of   findings warrants clinical exclusion of superimposed cholecystitis.   Consider gallbladder nuclear medicine scintigraphy for further   assessment.        MACRO:   None             Signed by: Jerry Donald 3/21/2025 5:16 PM   Dictation workstation:   [id number]      XR chest 2 views   Final Result   No acute cardiopulmonary disease.        Signed by: Lesley Benjamin 3/21/2025 1:37 PM   Dictation workstation:   [id number]

## 2025-03-23 VITALS
DIASTOLIC BLOOD PRESSURE: 61 MMHG | HEART RATE: 98 BPM | BODY MASS INDEX: 20.24 KG/M2 | WEIGHT: 90 LBS | OXYGEN SATURATION: 98 % | TEMPERATURE: 97.9 F | HEIGHT: 56 IN | RESPIRATION RATE: 17 BRPM | SYSTOLIC BLOOD PRESSURE: 101 MMHG

## 2025-03-23 LAB
ALBUMIN SERPL BCP-MCNC: 3.5 G/DL (ref 3.4–5)
ALP SERPL-CCNC: 79 U/L (ref 33–110)
ALT SERPL W P-5'-P-CCNC: 46 U/L (ref 7–45)
ANION GAP SERPL CALC-SCNC: 15 MMOL/L (ref 10–20)
AST SERPL W P-5'-P-CCNC: 87 U/L (ref 9–39)
BACTERIA BLD CULT: NORMAL
BACTERIA BLD CULT: NORMAL
BACTERIA UR CULT: NORMAL
BILIRUB SERPL-MCNC: 1.6 MG/DL (ref 0–1.2)
BUN SERPL-MCNC: 119 MG/DL (ref 6–23)
CALCIUM SERPL-MCNC: 9.2 MG/DL (ref 8.6–10.3)
CHLORIDE SERPL-SCNC: 99 MMOL/L (ref 98–107)
CO2 SERPL-SCNC: 23 MMOL/L (ref 21–32)
CREAT SERPL-MCNC: 3.63 MG/DL (ref 0.5–1.05)
CREAT UR-MCNC: 104.3 MG/DL (ref 20–320)
CREAT UR-MCNC: 104.3 MG/DL (ref 20–320)
EGFRCR SERPLBLD CKD-EPI 2021: 15 ML/MIN/1.73M*2
ERYTHROCYTE [DISTWIDTH] IN BLOOD BY AUTOMATED COUNT: 16.5 % (ref 11.5–14.5)
FERRITIN SERPL-MCNC: 648 NG/ML (ref 8–150)
GLUCOSE SERPL-MCNC: 101 MG/DL (ref 74–99)
HCT VFR BLD AUTO: 26 % (ref 36–46)
HGB BLD-MCNC: 9.1 G/DL (ref 12–16)
INR PPP: 1.8 (ref 0.9–1.1)
IRON SATN MFR SERPL: 47 % (ref 25–45)
IRON SERPL-MCNC: 60 UG/DL (ref 35–150)
MCH RBC QN AUTO: 35.1 PG (ref 26–34)
MCHC RBC AUTO-ENTMCNC: 35 G/DL (ref 32–36)
MCV RBC AUTO: 100 FL (ref 80–100)
NRBC BLD-RTO: 0 /100 WBCS (ref 0–0)
PLATELET # BLD AUTO: 239 X10*3/UL (ref 150–450)
POTASSIUM SERPL-SCNC: 4.1 MMOL/L (ref 3.5–5.3)
PROT SERPL-MCNC: 6.5 G/DL (ref 6.4–8.2)
PROTHROMBIN TIME: 20.3 SECONDS (ref 9.8–12.4)
RBC # BLD AUTO: 2.59 X10*6/UL (ref 4–5.2)
SODIUM SERPL-SCNC: 133 MMOL/L (ref 136–145)
SODIUM UR-SCNC: <10 MMOL/L
SODIUM/CREAT UR-RTO: NORMAL
TIBC SERPL-MCNC: 128 UG/DL (ref 240–445)
UIBC SERPL-MCNC: 68 UG/DL (ref 110–370)
UREA/CREAT UR-SRTO: 6.8 G/G CREAT
UUN UR-MCNC: 707 MG/DL
WBC # BLD AUTO: 14.6 X10*3/UL (ref 4.4–11.3)

## 2025-03-23 PROCEDURE — 83540 ASSAY OF IRON: CPT | Performed by: INTERNAL MEDICINE

## 2025-03-23 PROCEDURE — 85027 COMPLETE CBC AUTOMATED: CPT | Performed by: NURSE PRACTITIONER

## 2025-03-23 PROCEDURE — 2500000004 HC RX 250 GENERAL PHARMACY W/ HCPCS (ALT 636 FOR OP/ED): Performed by: NURSE PRACTITIONER

## 2025-03-23 PROCEDURE — 84300 ASSAY OF URINE SODIUM: CPT | Performed by: INTERNAL MEDICINE

## 2025-03-23 PROCEDURE — 2500000004 HC RX 250 GENERAL PHARMACY W/ HCPCS (ALT 636 FOR OP/ED): Performed by: EMERGENCY MEDICINE

## 2025-03-23 PROCEDURE — 99233 SBSQ HOSP IP/OBS HIGH 50: CPT | Performed by: INTERNAL MEDICINE

## 2025-03-23 PROCEDURE — 96366 THER/PROPH/DIAG IV INF ADDON: CPT

## 2025-03-23 PROCEDURE — 96375 TX/PRO/DX INJ NEW DRUG ADDON: CPT

## 2025-03-23 PROCEDURE — 96372 THER/PROPH/DIAG INJ SC/IM: CPT | Performed by: NURSE PRACTITIONER

## 2025-03-23 PROCEDURE — 36415 COLL VENOUS BLD VENIPUNCTURE: CPT | Performed by: NURSE PRACTITIONER

## 2025-03-23 PROCEDURE — 2500000002 HC RX 250 W HCPCS SELF ADMINISTERED DRUGS (ALT 637 FOR MEDICARE OP, ALT 636 FOR OP/ED): Mod: MUE | Performed by: NURSE PRACTITIONER

## 2025-03-23 PROCEDURE — 82728 ASSAY OF FERRITIN: CPT | Performed by: INTERNAL MEDICINE

## 2025-03-23 PROCEDURE — 2500000001 HC RX 250 WO HCPCS SELF ADMINISTERED DRUGS (ALT 637 FOR MEDICARE OP): Performed by: NURSE PRACTITIONER

## 2025-03-23 PROCEDURE — 85610 PROTHROMBIN TIME: CPT | Performed by: NURSE PRACTITIONER

## 2025-03-23 PROCEDURE — 80053 COMPREHEN METABOLIC PANEL: CPT | Performed by: NURSE PRACTITIONER

## 2025-03-23 PROCEDURE — 82570 ASSAY OF URINE CREATININE: CPT | Performed by: INTERNAL MEDICINE

## 2025-03-23 PROCEDURE — 96376 TX/PRO/DX INJ SAME DRUG ADON: CPT

## 2025-03-23 RX ORDER — ONDANSETRON 4 MG/1
TABLET, ORALLY DISINTEGRATING ORAL EVERY 8 HOURS PRN
Status: ON HOLD | COMMUNITY
End: 2025-03-26 | Stop reason: ENTERED-IN-ERROR

## 2025-03-23 RX ORDER — PRAVASTATIN SODIUM 40 MG/1
20 TABLET ORAL NIGHTLY
Status: CANCELLED | OUTPATIENT
Start: 2025-03-23

## 2025-03-23 RX ORDER — CHOLECALCIFEROL (VITAMIN D3) 25 MCG
2000 TABLET ORAL DAILY
Status: CANCELLED | OUTPATIENT
Start: 2025-03-23

## 2025-03-23 RX ORDER — FENTANYL CITRATE 50 UG/ML
25 INJECTION, SOLUTION INTRAMUSCULAR; INTRAVENOUS ONCE
Status: COMPLETED | OUTPATIENT
Start: 2025-03-23 | End: 2025-03-23

## 2025-03-23 RX ORDER — LORAZEPAM 2 MG/ML
0.25 INJECTION INTRAMUSCULAR ONCE
Status: COMPLETED | OUTPATIENT
Start: 2025-03-23 | End: 2025-03-23

## 2025-03-23 RX ORDER — FUROSEMIDE 40 MG/1
60 TABLET ORAL DAILY
Status: CANCELLED | OUTPATIENT
Start: 2025-03-23

## 2025-03-23 RX ORDER — SPIRONOLACTONE 25 MG/1
50 TABLET ORAL DAILY
Status: CANCELLED | OUTPATIENT
Start: 2025-03-23

## 2025-03-23 RX ADMIN — METRONIDAZOLE 500 MG: 500 INJECTION, SOLUTION INTRAVENOUS at 18:09

## 2025-03-23 RX ADMIN — METRONIDAZOLE 500 MG: 500 INJECTION, SOLUTION INTRAVENOUS at 09:29

## 2025-03-23 RX ADMIN — DIVALPROEX SODIUM 500 MG: 250 TABLET, EXTENDED RELEASE ORAL at 21:04

## 2025-03-23 RX ADMIN — LORAZEPAM 0.25 MG: 2 INJECTION INTRAMUSCULAR; INTRAVENOUS at 03:03

## 2025-03-23 RX ADMIN — FENTANYL CITRATE 25 MCG: 50 INJECTION INTRAMUSCULAR; INTRAVENOUS at 06:27

## 2025-03-23 RX ADMIN — METRONIDAZOLE 500 MG: 500 INJECTION, SOLUTION INTRAVENOUS at 01:49

## 2025-03-23 RX ADMIN — HEPARIN SODIUM 5000 UNITS: 5000 INJECTION, SOLUTION INTRAVENOUS; SUBCUTANEOUS at 13:22

## 2025-03-23 RX ADMIN — HEPARIN SODIUM 5000 UNITS: 5000 INJECTION, SOLUTION INTRAVENOUS; SUBCUTANEOUS at 06:27

## 2025-03-23 RX ADMIN — FLUOXETINE HYDROCHLORIDE 60 MG: 20 CAPSULE ORAL at 06:08

## 2025-03-23 RX ADMIN — LEVOTHYROXINE SODIUM 25 MCG: 50 TABLET ORAL at 08:30

## 2025-03-23 RX ADMIN — CEFTRIAXONE 2 G: 2 INJECTION, SOLUTION INTRAVENOUS at 16:47

## 2025-03-23 NOTE — PROGRESS NOTES
Emergency Medicine Transition of Care Note.    I received Elizabeth Toledo in signout from Dr. Naidu.  Please see the previous ED provider note for all HPI, PE and MDM up to the time of signout at 0100. This is in addition to the primary record.    In brief Elizabeth Toledo is an 49 y.o. female presenting for   Chief Complaint   Patient presents with    Abdominal Pain     Pt was sent in from her PCP for abnormal labs, pt reportedly cirrhosis and receives a paracentesis weekly her. Pt is also reporting intermittent abdominal pain.    Abnormal Labs     At the time of signout we were awaiting: transfer to [facility] [place] [facility]    ED Course as of 03/23/25 0745   Fri Mar 21, 2025   1742 Spoke with Dr. Baez of general surgery who recommends transfer to [facility] medical team due to patient's medical complexity. [RS]   2128 Spoke with Dr. Ontiveros of hepatology who does recommend pt be transferred to [facility] [place] [facility]. [RS]   2233 Discussed with Dr. Perez, surgery. Given MELD of 32 and acute decompensation, she would need to be medically optimized before any surgical procedure would be safe or recommended. She would be happy to see the patient in consult.  [SP]   6142 Discussed with Dr. Jose Padilla, hospitalist at [facility]. Ultimately, the patient was accepted in transfer and will be waiting for bed. I will call back if there is any change in her condition.  [SP]   Sun Mar 23, 2025   0619 Notified by nursing staff that patient's family has been giving her meds from home. She has apparently declined what we have ordered for home meds. They have her meds in unlabeled container.  [SP]      ED Course User Index  [RS] Harman Cavazos DO  [SP] Gretchen Castro DO         Diagnoses as of 03/23/25 0745   Cholecystitis   Hepatorenal syndrome (Multi)   Liver failure without hepatic coma, unspecified chronicity (Multi)       Medical Decision Making  Patient is being followed by [facility] as well awaiting transfer to [facility] when bed is available.  Morning labs were drawn.  She does have  clear liquid diet ordered and is receiving scheduled antibiotics.  Overnight she was awake and restless.  She felt very anxious.  She was given a 0.25 mg dose of Ativan to assist with anxiety.      See above note regarding medication.  Patient's family was instructed on need to not give her any additional medication as this could cause problems with hospital ordered medications and even medications that she takes normally may need adjusted dosage based on her kidney and liver function.    The patient was signed out to the morning physician pending transfer to [facility] when bed becomes available.    Final diagnoses:   [K81.9] Cholecystitis   [K76.7] Hepatorenal syndrome (Multi)   [K72.90] Liver failure without hepatic coma, unspecified chronicity (Multi)           Procedure  Procedures    Gretchen Castro DO

## 2025-03-23 NOTE — PROGRESS NOTES
Elizabeth Toledo is a 49 y.o. female on day 0 of admission presenting with No Principal Problem: There is no principal problem currently on the Problem List. Please update the Problem List and refresh..      Subjective      Elizabeth Toledo is a 49 y.o. female with PMHx s/f liver cirrhosis, hepato renal syndrome, hypothyroidism, Rogelio syndrome, disruptive mood disorder came to the hospital with complaints of diffuse abdominal pain and reports of leukocytosis and outpatient lab work.  Lab studies demonstrated a markedly elevated BUN/creatinine suggesting DANIELLA and a reoccurrence of hepatorenal syndrome as well as hyponatremia and coagulopathy with increased liver enzymes as well.  There is significant leukocytosis with white blood cell count 25.2 the CT of the abdomen and pelvis shows liver cirrhosis and ascites as well as mild mesenteric edema ultrasound of the right upper quadrant shows the gallbladder wall to be thickened and with some positive Shannon signs.  Patient was started on IV Rocephin the emergency department contacted [facility] to arrange for transfer because there is no GI coverage here and due to the complexity of her liver pathology.  Unfortunately no bed is available at this time inpatient medicine was consulted to assist with medical management in the interim.  The patient was given boluses of albumin to help support her pressure.  Presently she is seen in the emergency department she is denying any fevers chills or rigors she does admit to some nausea 1 episode of vomiting.  She does complain of diffuse abdominal pain and increased abdominal girth.  Patient does not have any lower extremity edema is not having any diarrhea.   3/23: Patient was seen and examined.  Still awaiting bed assignment for transfer to [facility].  Creatinine continues to trend down.  Hyponatremia also improving.  Leukocytosis continues to trend down as well.  Continue current IV antibiotics.  Patient will need paracentesis with  fluid analysis to diagnose potential SBP.  Will consult IR for possible paracentesis in a.m. and fluid analysis afterwards.  Continue to trend INR CMP and CBC daily      Objective     Last Recorded Vitals  BP (!) 106/45   Pulse 85   Temp 36.6 °C (97.9 °F)   Resp 18   Wt (!) 40.8 kg (90 lb)   SpO2 100%   Intake/Output last 3 Shifts:  No intake or output data in the 24 hours ending 03/23/25 1532    Admission Weight  Weight: (!) 40.8 kg (90 lb) (03/21/25 1203)    Daily Weight  03/21/25 : (!) 40.8 kg (90 lb)    Image Results  US gallbladder  Narrative: Interpreted By:  Jerry Donald,   STUDY:  US GALLBLADDER;  3/21/2025 4:58 pm      INDICATION:  Signs/Symptoms:Abdominal pain with elevated LFT's and lipase.  All      COMPARISON:  None.      ACCESSION NUMBER(S):  [id number]      ORDERING CLINICIAN:  QIANA SANTA      TECHNIQUE:  Multiple images of the right upper quadrant were obtained.      FINDINGS:  LIVER:  The liver measures 19.7 cm coarsened hepatic echotexture is noted. No  discrete lesions are detected in the liver.          GALLBLADDER:  No obvious cholelithiasis, however there appears to be some  gallbladder sludge. The gallbladder wall thickness is 0.5 cm.  Sonographic Shannon's sign is positive.          BILE DUCTS:  No evidence of intra or extrahepatic biliary dilatation is  identified; the common bile duct measures 0.2 cm.      PANCREAS:  The pancreas is poorly visualized due to overlying bowel gas.      RIGHT KIDNEY:  The right kidney measures 9.5 cm in length. The renal cortical  echogenicity appears unremarkable.  No hydronephrosis. No renal  calculi are detected.      Moderate ascites.      Impression: Hepatomegaly with cirrhotic morphology liver. Associated moderate  volume ascites.      Gallbladder wall thickening with gallbladder sludge and a positive  sonographic Shannon sign reported by sonographer. Constellation of  findings warrants clinical exclusion of superimposed  cholecystitis.  Consider gallbladder nuclear medicine scintigraphy for further  assessment.      MACRO:  None          Signed by: Jerry Donald 3/21/2025 5:16 PM  Dictation workstation:   [id number]  ECG 12 lead  Sinus rhythm  Abnormal R-wave progression, early transition  LVH with secondary repolarization abnormality  Baseline wander in lead(s) V1,V3,V5    See ED provider note for full interpretation and clinical correlation  Confirmed by Jaky Singh ([id number]) on 3/21/2025 1:44:46 PM  XR chest 2 views  Narrative: Interpreted By:  Lesley Benjamin,   STUDY:  XR CHEST 2 VIEWS 3/21/2025 1:16 pm      INDICATION:  Signs/Symptoms:leukocytosis      COMPARISON:  None available.      ACCESSION NUMBER(S):  [id number]      ORDERING CLINICIAN:  QIANA SANTA      TECHNIQUE:  AP erect and lateral views of the chest      FINDINGS:  Normal heart, mediastinum, and lungs. There is spondylotic spurring  of the thoracic endplates.      Impression: No acute cardiopulmonary disease.      Signed by: Lesley Benjamin 3/21/2025 1:37 PM  Dictation workstation:   [id number]      Physical Exam  Vitals:    03/23/25 1302   BP: (!) 106/45   Pulse: 85   Resp: 18   Temp:    SpO2: 100%     Constitutional:       General: She is not in acute distress.  HENT:      Head: Normocephalic and atraumatic.      Right Ear: External ear normal.      Left Ear: External ear normal.      Nose: Nose normal.      Mouth/Throat:      Mouth: Mucous membranes are moist.      Pharynx: Oropharynx is clear.   Eyes:      Extraocular Movements: Extraocular movements intact.      Conjunctiva/sclera: Conjunctivae normal.      Pupils: Pupils are equal, round, and reactive to light.   Cardiovascular:      Rate and Rhythm: Normal rate and regular rhythm.      Pulses: Normal pulses.      Heart sounds: Normal heart sounds.   Pulmonary:      Effort: Pulmonary effort is normal. No respiratory distress.      Breath sounds: Normal breath sounds. No wheezing, rhonchi or rales.    Chest:      Chest wall: No tenderness.   Abdominal:      General: Bowel sounds are normal. There is distension.      Palpations: Abdomen is soft. There is no mass.      Tenderness: There is abdominal tenderness. There is no rebound.   Musculoskeletal:         General: No swelling or deformity. Normal range of motion.      Cervical back: Normal range of motion.   Skin:     General: Skin is warm and dry.      Capillary Refill: Capillary refill takes less than 2 seconds.   Neurological:      General: No focal deficit present.      Mental Status: She is alert and oriented to person, place, and time.   Psychiatric:         Mood and Affect: Mood normal.         Behavior: Behavior normal.      Relevant Results  {If you would like to pull in Medications, type .meds     If you would like to pull in Lab results for the last 24 hours, type .[id number]    If you would like to pull in Imaging results, type .imgrslt :99}      {Link to Stroke Scoring tools - Link :99}       Assessment/Plan                  Assessment & Plan        ? Acute Cholecyctitis  cirrhosis and ascites with mesenteric edema, sepsis w DANIELLA  HIDA scan pending  Continue IV antibiotics ceftriaxone and Flagyl  Blood cultures are negative x 1 day  We will hold any hepatotoxic medications for now  Plan [place] [facility] [place]    ?  Spontaneous bacterial peritonitis  Currently on IV antibiotic ceftriaxone  IR consulted for possible paracentesis in a.m.  Fluid analysis pending paracentesis  Blood cultures negative x 1 day  Trend CBC daily       DANIELLA on CKD  Etiology unclear; possibly prerenal versus hepatorenal syndrome  Responding to initial IV albumin bolus  Will continue as needed IV fluids plus albumin  Urine creatinine urine urea and urine sodium to calculate Yari/Fe urea  Will avoid nephrotoxins  Consider nephrology consult     Anemia stable  Stable so far no signs of active bleeding  Likely anemia of chronic  disease in the setting of sepsis  Will get iron panel ferritin vitamin B12 and folate  Replenish as needed     Hypothyroidism  Resume home levothyroxine     Disruptive mood disorder  Continue her Depakote and Abilify to avoid withdrawal symptoms     Dyslipidemia  Hold statins for now     VTE prophylaxis  Subcutaneous heparin  Monitor platelets closely          No new Assessment & Plan notes have been filed under this hospital service since the last note was generated.  Service: Internal Medicine       Spent 35 minutes in follow-up management of this patient today       Luis F Burgess MD

## 2025-03-23 NOTE — ED NOTES
([place] [facility]  [place] @2015   labs     Liban Cobb RN  03/22/25 9821

## 2025-03-23 NOTE — PROGRESS NOTES
This progress note represents a emergency department transition note for signout of care.    Patient care was signed out to me. Please see the previous provider's notes for the full history and physical. Briefly, Elizabeth Toledo is 49-year-old female awaiting transfer to [facility].  Patient has concern of decompensated liver cirrhosis with hepatorenal in addition to acute cholecystitis currently on antibiotics.  IMS has been consulted.  During my shift, no acute events.  I did attempt to get an update from [facility] transfer center.  None was provided.  Patient signed out still pending bed transfer    Will DO Faisal  Emergency Medicine    ED Course as of 03/23/25 1617   Fri Mar 21, 2025   1742 Spoke with Dr. Baez of general surgery who recommends transfer to [facility] medical team due to patient's medical complexity. [RS]   2128 Spoke with Dr. Ontiveros of hepatology who does recommend pt be transferred to [facility]. [RS]   2233 Discussed with Dr. Perez, surgery. Given MELD of 32 and acute decompensation, she would need to be medically optimized before any surgical procedure would be safe or recommended. She would be happy to see the patient in consult.  [SP]   2257 Discussed with Dr. Jose Padilla, hospitalist at [facility]. Ultimately, the patient was accepted in transfer and will be waiting for bed. I will call back if there is any change in her condition.  [SP]   Sun Mar 23, 2025   0619 Notified by nursing staff that patient's family has been giving her meds from home. She has apparently declined what we have ordered for home meds. They have her meds in unlabeled container.  [SP]      ED Course User Index  [RS] Harman Cavazos DO  [SP] Gretchen Castro DO         Diagnoses as of 03/23/25 1617   Cholecystitis   Hepatorenal syndrome (Multi)   Liver failure without hepatic coma, unspecified chronicity (Multi)

## 2025-03-23 NOTE — PROGRESS NOTES
Elizabeth Toledo is a 49 y.o. female admitted for No Principal Problem: There is no principal problem currently on the Problem List. Please update the Problem List and refresh.. Pharmacy reviewed the patient's [id number] medications and allergies for accuracy.    The list below reflects the PTA list prior to pharmacy medication history. A summary a changes to the PTA medication list has been listed below. Please review each medication in order reconciliation for additional clarification and justification.    Source of information:  [facility]    Medications added:  Ondansetron  ODT 4mg 1 q8prn    Medications modified:  Vitamin D3 2000u ------------> 1 qd    Medications to be removed:    Medications of concern:      Prior to Admission Medications   Prescriptions Last Dose Informant Patient Reported? Taking?   ARIPiprazole (Abilify) 15 mg tablet   Yes No   Sig: Take 1 tablet (15 mg) by mouth once daily.   FLUoxetine (PROzac) 20 mg capsule   Yes No   Sig: Take 3 capsules (60 mg) by mouth once daily.   cholecalciferol (Vitamin D-3) 50 mcg (2,000 unit) capsule   Yes No   Sig: Take by mouth.   divalproex (Depakote ER) 250 mg 24 hr tablet   Yes No   Sig: Take 2 tablets (500 mg) by mouth at bedtime   fenofibrate micronized (Lofibra) 134 mg capsule   No No   Sig: Take 1 capsule (134 mg) by mouth once daily with breakfast.   furosemide (Lasix) 40 mg tablet   No No   Sig: Take 1.5 tablets (60 mg) by mouth once daily.   hepatitis B virus vacc.rec,PF, (ENGERIX-B) 20 mcg/mL syringe   No No   Sig: Give a dose now and in 2 months   levothyroxine (Synthroid, Levoxyl) 25 mcg tablet   No No   Sig: TAKE 1 TABLET BY MOUTH EVERY DAY   lovastatin (Mevacor) 20 mg tablet   No No   Sig: TAKE 1 TABLET BY MOUTH EVERY DAY   spironolactone (Aldactone) 50 mg tablet   No No   Sig: Take 1 tablet (50 mg) by mouth once daily.   zinc gluconate 50 mg tablet   No No   Sig: Take 1 tablet (50 mg of elemental zinc) by mouth 2 times a  day.      Facility-Administered Medications: None       Pretty Irvin, PharmD  PGY-1 Pharmacy Resident

## 2025-03-24 ENCOUNTER — APPOINTMENT (OUTPATIENT)
Dept: RADIOLOGY | Facility: HOSPITAL | Age: 50
End: 2025-03-24
Payer: MEDICARE

## 2025-03-24 ENCOUNTER — APPOINTMENT (OUTPATIENT)
Dept: RADIOLOGY | Facility: CLINIC | Age: 50
End: 2025-03-24
Payer: MEDICARE

## 2025-03-24 ENCOUNTER — TELEPHONE (OUTPATIENT)
Dept: GASTROENTEROLOGY | Facility: CLINIC | Age: 50
End: 2025-03-24
Payer: MEDICARE

## 2025-03-24 LAB
ALBUMIN SERPL BCP-MCNC: 3.4 G/DL (ref 3.4–5)
ALP SERPL-CCNC: 81 U/L (ref 33–110)
ALT SERPL W P-5'-P-CCNC: 52 U/L (ref 7–45)
ANION GAP SERPL CALC-SCNC: 16 MMOL/L (ref 10–20)
AST SERPL W P-5'-P-CCNC: 103 U/L (ref 9–39)
BASOPHILS # BLD AUTO: 0.06 X10*3/UL (ref 0–0.1)
BASOPHILS NFR BLD AUTO: 0.4 %
BILIRUB SERPL-MCNC: 1.7 MG/DL (ref 0–1.2)
BUN SERPL-MCNC: 112 MG/DL (ref 6–23)
CALCIUM SERPL-MCNC: 9.3 MG/DL (ref 8.6–10.3)
CHLORIDE SERPL-SCNC: 99 MMOL/L (ref 98–107)
CO2 SERPL-SCNC: 22 MMOL/L (ref 21–32)
CREAT SERPL-MCNC: 3.16 MG/DL (ref 0.5–1.05)
EGFRCR SERPLBLD CKD-EPI 2021: 17 ML/MIN/1.73M*2
EOSINOPHIL # BLD AUTO: 0.23 X10*3/UL (ref 0–0.7)
EOSINOPHIL NFR BLD AUTO: 1.4 %
ERYTHROCYTE [DISTWIDTH] IN BLOOD BY AUTOMATED COUNT: 16 % (ref 11.5–14.5)
FOLATE SERPL-MCNC: 5.7 NG/ML
GLUCOSE SERPL-MCNC: 103 MG/DL (ref 74–99)
HCT VFR BLD AUTO: 27.4 % (ref 36–46)
HGB BLD-MCNC: 9.4 G/DL (ref 12–16)
IMM GRANULOCYTES # BLD AUTO: 0.19 X10*3/UL (ref 0–0.7)
IMM GRANULOCYTES NFR BLD AUTO: 1.2 % (ref 0–0.9)
LDH SERPL L TO P-CCNC: 183 U/L (ref 84–246)
LYMPHOCYTES # BLD AUTO: 1.57 X10*3/UL (ref 1.2–4.8)
LYMPHOCYTES NFR BLD AUTO: 9.8 %
MCH RBC QN AUTO: 34.7 PG (ref 26–34)
MCHC RBC AUTO-ENTMCNC: 34.3 G/DL (ref 32–36)
MCV RBC AUTO: 101 FL (ref 80–100)
MONOCYTES # BLD AUTO: 1.11 X10*3/UL (ref 0.1–1)
MONOCYTES NFR BLD AUTO: 6.9 %
NEUTROPHILS # BLD AUTO: 12.88 X10*3/UL (ref 1.2–7.7)
NEUTROPHILS NFR BLD AUTO: 80.3 %
NRBC BLD-RTO: 0 /100 WBCS (ref 0–0)
PLATELET # BLD AUTO: 162 X10*3/UL (ref 150–450)
POTASSIUM SERPL-SCNC: 4 MMOL/L (ref 3.5–5.3)
PROT SERPL-MCNC: 6.7 G/DL (ref 6.4–8.2)
RBC # BLD AUTO: 2.71 X10*6/UL (ref 4–5.2)
SODIUM SERPL-SCNC: 133 MMOL/L (ref 136–145)
VIT B12 SERPL-MCNC: >2000 PG/ML (ref 211–911)
WBC # BLD AUTO: 16 X10*3/UL (ref 4.4–11.3)

## 2025-03-24 PROCEDURE — 83615 LACTATE (LD) (LDH) ENZYME: CPT | Performed by: INTERNAL MEDICINE

## 2025-03-24 PROCEDURE — 78226 HEPATOBILIARY SYSTEM IMAGING: CPT

## 2025-03-24 PROCEDURE — 2500000001 HC RX 250 WO HCPCS SELF ADMINISTERED DRUGS (ALT 637 FOR MEDICARE OP): Performed by: NURSE PRACTITIONER

## 2025-03-24 PROCEDURE — 2500000004 HC RX 250 GENERAL PHARMACY W/ HCPCS (ALT 636 FOR OP/ED): Performed by: NURSE PRACTITIONER

## 2025-03-24 PROCEDURE — 96375 TX/PRO/DX INJ NEW DRUG ADDON: CPT | Mod: 59

## 2025-03-24 PROCEDURE — 36415 COLL VENOUS BLD VENIPUNCTURE: CPT | Performed by: INTERNAL MEDICINE

## 2025-03-24 PROCEDURE — 2500000001 HC RX 250 WO HCPCS SELF ADMINISTERED DRUGS (ALT 637 FOR MEDICARE OP): Performed by: EMERGENCY MEDICINE

## 2025-03-24 PROCEDURE — 2500000004 HC RX 250 GENERAL PHARMACY W/ HCPCS (ALT 636 FOR OP/ED): Performed by: EMERGENCY MEDICINE

## 2025-03-24 PROCEDURE — 2500000002 HC RX 250 W HCPCS SELF ADMINISTERED DRUGS (ALT 637 FOR MEDICARE OP, ALT 636 FOR OP/ED): Performed by: NURSE PRACTITIONER

## 2025-03-24 PROCEDURE — 82607 VITAMIN B-12: CPT | Mod: PORLAB | Performed by: INTERNAL MEDICINE

## 2025-03-24 PROCEDURE — 96372 THER/PROPH/DIAG INJ SC/IM: CPT | Performed by: NURSE PRACTITIONER

## 2025-03-24 PROCEDURE — 85025 COMPLETE CBC W/AUTO DIFF WBC: CPT | Performed by: INTERNAL MEDICINE

## 2025-03-24 PROCEDURE — 3430000001 HC RX 343 DIAGNOSTIC RADIOPHARMACEUTICALS: Performed by: INTERNAL MEDICINE

## 2025-03-24 PROCEDURE — 99233 SBSQ HOSP IP/OBS HIGH 50: CPT | Performed by: INTERNAL MEDICINE

## 2025-03-24 PROCEDURE — 82746 ASSAY OF FOLIC ACID SERUM: CPT | Mod: PORLAB | Performed by: INTERNAL MEDICINE

## 2025-03-24 PROCEDURE — 96366 THER/PROPH/DIAG IV INF ADDON: CPT

## 2025-03-24 PROCEDURE — 78226 HEPATOBILIARY SYSTEM IMAGING: CPT | Performed by: STUDENT IN AN ORGANIZED HEALTH CARE EDUCATION/TRAINING PROGRAM

## 2025-03-24 PROCEDURE — 80053 COMPREHEN METABOLIC PANEL: CPT | Performed by: INTERNAL MEDICINE

## 2025-03-24 PROCEDURE — A9537 TC99M MEBROFENIN: HCPCS | Performed by: INTERNAL MEDICINE

## 2025-03-24 RX ORDER — KIT FOR THE PREPARATION OF TECHNETIUM TC 99M MEBROFENIN 45 MG/10ML
5 INJECTION, POWDER, LYOPHILIZED, FOR SOLUTION INTRAVENOUS
Status: COMPLETED | OUTPATIENT
Start: 2025-03-24 | End: 2025-03-24

## 2025-03-24 RX ORDER — LOVASTATIN 20 MG/1
20 TABLET ORAL NIGHTLY
Status: DISCONTINUED | OUTPATIENT
Start: 2025-03-24 | End: 2025-03-25 | Stop reason: HOSPADM

## 2025-03-24 RX ADMIN — LEVOTHYROXINE SODIUM 25 MCG: 50 TABLET ORAL at 06:33

## 2025-03-24 RX ADMIN — HEPARIN SODIUM 5000 UNITS: 5000 INJECTION, SOLUTION INTRAVENOUS; SUBCUTANEOUS at 21:29

## 2025-03-24 RX ADMIN — DIVALPROEX SODIUM 500 MG: 250 TABLET, EXTENDED RELEASE ORAL at 21:31

## 2025-03-24 RX ADMIN — FLUOXETINE HYDROCHLORIDE 60 MG: 20 CAPSULE ORAL at 06:31

## 2025-03-24 RX ADMIN — HEPARIN SODIUM 5000 UNITS: 5000 INJECTION, SOLUTION INTRAVENOUS; SUBCUTANEOUS at 13:23

## 2025-03-24 RX ADMIN — LOVASTATIN 20 MG: 20 TABLET ORAL at 22:18

## 2025-03-24 RX ADMIN — KIT FOR THE PREPARATION OF TECHNETIUM TC 99M MEBROFENIN 5 MILLICURIE: 45 INJECTION, POWDER, LYOPHILIZED, FOR SOLUTION INTRAVENOUS at 09:35

## 2025-03-24 RX ADMIN — METRONIDAZOLE 500 MG: 500 INJECTION, SOLUTION INTRAVENOUS at 01:50

## 2025-03-24 RX ADMIN — METRONIDAZOLE 500 MG: 500 INJECTION, SOLUTION INTRAVENOUS at 18:36

## 2025-03-24 RX ADMIN — HEPARIN SODIUM 5000 UNITS: 5000 INJECTION, SOLUTION INTRAVENOUS; SUBCUTANEOUS at 06:31

## 2025-03-24 RX ADMIN — METRONIDAZOLE 500 MG: 500 INJECTION, SOLUTION INTRAVENOUS at 09:18

## 2025-03-24 RX ADMIN — CEFTRIAXONE 2 G: 2 INJECTION, SOLUTION INTRAVENOUS at 16:24

## 2025-03-24 NOTE — PROGRESS NOTES
Emergency Medicine Transition of Care Note.    I received Elizabeth Toledo in signout from Dr. Castro.  Please see the previous ED provider note for all HPI, PE and MDM up to the time of signout. This is in addition to the primary record.    In brief Elizabeth Toledo is an 49 y.o. female presenting for   Chief Complaint   Patient presents with    Abdominal Pain     Pt was sent in from her PCP for abnormal labs, pt reportedly cirrhosis and receives a paracentesis weekly her. Pt is also reporting intermittent abdominal pain.    Abnormal Labs        ED Course as of 03/25/25 0005   Fri Mar 21, 2025   1742 Spoke with Dr. Baez of general surgery who recommends transfer to [facility] medical team due to patient's medical complexity. [RS]   2128 Spoke with Dr. Ontiveros of hepatology who does recommend pt be transferred to [facility]. [RS]   2233 Discussed with Dr. Perez, surgery. Given MELD of 32 and acute decompensation, she would need to be medically optimized before any surgical procedure would be safe or recommended. She would be happy to see the patient in consult.  [SP]   2257 Discussed with Dr. Jose Padilla, hospitalist at [facility]. Ultimately, the patient was accepted in transfer and will be waiting for bed. I will call back if there is any change in her condition.  [SP]   Sun Mar 23, 2025   0619 Notified by nursing staff that patient's family has been giving her meds from home. She has apparently declined what we have ordered for home meds. They have her meds in unlabeled container.  [SP]   Mon Mar 24, 2025   1527 IR was unable to perform paracentesis today secondary to high procedure volume in IR, they contacted ED to let us know there is no IR physician on call tomorrow and her paracentesis will be performed on Wednesday [JG]      ED Course User Index  [JG] Ely Aguilar MD  [RS] Harman Cavazos DO  [SP] Gretchen Castro DO         Diagnoses as of 03/25/25 0005   Cholecystitis   Hepatorenal syndrome (Multi)   Liver failure without  hepatic coma, unspecified chronicity (Multi)       Medical Decision Making  Received patient in signout at this time. Patient is accepted to [facility] for acute cholecystitis, aga liver failure with MELD 32. Boarding in the emergency department at this time pending bed availability at accepting facility and transfer.  No acute concerns were vocalized and no significant events occurred while under my care.      Final diagnoses:   [K81.9] Cholecystitis   [K76.7] Hepatorenal syndrome (Multi)   [K72.90] Liver failure without hepatic coma, unspecified chronicity (Multi)           Procedure  Procedures    Ely Aguilar MD

## 2025-03-24 NOTE — PROGRESS NOTES
Elizabeth Toledo is a 49 y.o. female on day 0 of admission presenting with No Principal Problem: There is no principal problem currently on the Problem List. Please update the Problem List and refresh..      Subjective      Elizabeth Toledo is a 49 y.o. female with PMHx s/f liver cirrhosis, hepato renal syndrome, hypothyroidism, Rogelio syndrome, disruptive mood disorder came to the hospital with complaints of diffuse abdominal pain and reports of leukocytosis and outpatient lab work.  Lab studies demonstrated a markedly elevated BUN/creatinine suggesting DANIELLA and a reoccurrence of hepatorenal syndrome as well as hyponatremia and coagulopathy with increased liver enzymes as well.  There is significant leukocytosis with white blood cell count 25.2 the CT of the abdomen and pelvis shows liver cirrhosis and ascites as well as mild mesenteric edema ultrasound of the right upper quadrant shows the gallbladder wall to be thickened and with some positive Shannon signs.  Patient was started on IV Rocephin the emergency department [place] [facility] [place] for [place] because there is no GI coverage here and due to the complexity of her liver pathology.  Unfortunately no bed is available at this time inpatient medicine was consulted to assist with medical management in the interim.  The patient was given boluses of albumin to help support her pressure.  Presently she is seen in the emergency department she is denying any fevers chills or rigors she does admit to some nausea 1 episode of vomiting.  She does complain of diffuse abdominal pain and increased abdominal girth.  Patient does not have any lower extremity edema is not having any diarrhea.   3/23: Patient was seen and examined.  Still awaiting bed assignment for transfer to [facility].  Creatinine continues to trend down.  Hyponatremia also improving.  Leukocytosis continues to trend down as well.  Continue current IV antibiotics.  Patient will need paracentesis with  fluid analysis to diagnose potential SBP.  Will consult IR for possible paracentesis in a.m. and fluid analysis afterwards.  Continue to trend INR CMP and CBC daily  3/24: Patient was seen and examined.  Still awaiting bed assignment for transfer to [facility].  HIDA scan this morning is not consistent with acute cholecystitis.  IR unable to do paracentesis today.  So we will continue current IV antibiotics ceftriaxone.  Fluid analysis pending.  Creatinine continues to trend down.  Liver enzymes also trending down.  Continue to monitor CBC and CMP daily    Objective     Last Recorded Vitals  BP 97/60   Pulse 88   Temp 36.4 °C (97.5 °F) (Oral)   Resp 16   Wt (!) 40.8 kg (90 lb)   SpO2 99%   Intake/Output last 3 Shifts:    Intake/Output Summary (Last 24 hours) at 3/24/2025 1608  Last data filed at 3/24/2025 0303  Gross per 24 hour   Intake 250 ml   Output --   Net 250 ml       Admission Weight  Weight: (!) 40.8 kg (90 lb) (03/21/25 1203)    Daily Weight  03/21/25 : (!) 40.8 kg (90 lb)    Image Results  NM hepatobiliary  Narrative: Interpreted By:  Dre Gage and Ohs Zachary   STUDY:  NM HEPATOBILIARY;  3/24/2025 10:41 am      INDICATION:  Signs/Symptoms:? Acute Cholecystitis.      COMPARISON:  Right upper quadrant ultrasound 03/21/2025.      ACCESSION NUMBER(S):  [id number]      ORDERING CLINICIAN:  OLIVIA CALABRESE      TECHNIQUE:  DIVISION OF NUCLEAR MEDICINE  HEPATOBILIARY SCAN (HIDA), QUANTITATIVE      The patient received an intravenous dose of  5 mCi of Tc-99m  mebrofenin (Choletec).  Sequential images of the upper abdomen were  then acquired over the next 60 minutes.      FINDINGS:  There is prompt accumulation of activity within the liver and normal  subsequent excretion via the biliary ductal system into the small  bowel.  The gallbladder first visualizes at about  28 minutes after  radiopharmaceutical injection and progressively fills.      Impression: 1. Patency of cystic duct and common bile duct  without evidence of  acute cholecystitis.      I personally reviewed the images/study and I agree with the findings  as stated by Dr. Jose Alberto Tony. This study was interpreted at  [facility], [place], [place].      MACRO:  None      Signed by: Dre Gage 3/24/2025 11:30 AM  Dictation workstation:   [id number]      Physical Exam  Vitals:    03/24/25 1417   BP: 97/60   Pulse: 88   Resp: 16   Temp:    SpO2: 99%     Constitutional:       General: She is not in acute distress.  HENT:      Head: Normocephalic and atraumatic.      Right Ear: External ear normal.      Left Ear: External ear normal.      Nose: Nose normal.      Mouth/Throat:      Mouth: Mucous membranes are moist.      Pharynx: Oropharynx is clear.   Eyes:      Extraocular Movements: Extraocular movements intact.      Conjunctiva/sclera: Conjunctivae normal.      Pupils: Pupils are equal, round, and reactive to light.   Cardiovascular:      Rate and Rhythm: Normal rate and regular rhythm.      Pulses: Normal pulses.      Heart sounds: Normal heart sounds.   Pulmonary:      Effort: Pulmonary effort is normal. No respiratory distress.      Breath sounds: Normal breath sounds. No wheezing, rhonchi or rales.   Chest:      Chest wall: No tenderness.   Abdominal:      General: Bowel sounds are normal. There is distension.      Palpations: Abdomen is soft. There is no mass.      Tenderness: There is abdominal tenderness. There is no rebound.   Musculoskeletal:         General: No swelling or deformity. Normal range of motion.      Cervical back: Normal range of motion.   Skin:     General: Skin is warm and dry.      Capillary Refill: Capillary refill takes less than 2 seconds.   Neurological:      General: No focal deficit present.      Mental Status: She is alert and oriented to person, place, and time.   Psychiatric:         Mood and Affect: Mood normal.         Behavior: Behavior normal.      Relevant Results                Assessment/Plan                  Assessment & Plan        Abdominal pain? Acute Cholecyctitis  cirrhosis and ascites with mesenteric edema, sepsis w DANIELLA  HIDA scan not consistent with acute cholecystitis  Continue IV antibiotics ceftriaxone and Flagyl  Blood cultures are negative x 2 day  We will hold any hepatotoxic medications for now  Plan is to have the patient transferred to [facility] where hepatology is available    ?  Spontaneous bacterial peritonitis  Currently on IV antibiotic ceftriaxone  IR consulted for possible paracentesis pending  Fluid analysis pending paracentesis  Blood cultures negative x 1 day  Trend CBC daily       DANIELLA on CKD  Etiology unclear; possibly prerenal versus hepatorenal syndrome  Responding to initial IV albumin bolus  Will continue as needed IV fluids plus albumin  Urine creatinine urine urea and urine sodium to calculate Yari/Fe urea  Will avoid nephrotoxins  Consider nephrology consult     Anemia stable  Stable so far no signs of active bleeding  Likely anemia of chronic disease in the setting of sepsis  Will get iron panel ferritin vitamin B12 and folate  Replenish as needed     Hypothyroidism  Resume home levothyroxine     Disruptive mood disorder  Continue her Depakote and Abilify to avoid withdrawal symptoms     Dyslipidemia  Hold statins for now     VTE prophylaxis  Subcutaneous heparin  Monitor platelets closely          No new Assessment & Plan notes have been filed under this hospital service since the last note was generated.  Service: Internal Medicine       Spent 35 minutes in follow-up management of this patient today       Luis F Burgess MD

## 2025-03-24 NOTE — PROGRESS NOTES
This progress note represents a emergency department transition note for signout of care.    Patient care was signed out to me. Please see the previous provider's notes for the full history and physical. Briefly, Elizabeth Toledo is 49 y.o. female who is awaiting transfer to [facility].  Patient has concerns for hepatorenal failure, acute cholecystitis.  Deemed by surgery to treat medically only and avoid surgical procedure.  During my shift, no acute events other than ordering patient's home medication and patient was signed out    Will DO Faisal  Emergency Medicine    ED Course as of 03/24/25 1640   Fri Mar 21, 2025   1742 Spoke with Dr. Baez of general surgery who recommends transfer to [facility] medical team due to patient's medical complexity. [RS]   2128 Spoke with Dr. Ontiveros of hepatology who does recommend pt be transferred to [facility]. [RS]   2233 Discussed with Dr. Perez, surgery. Given MELD of 32 and acute decompensation, she would need to be medically optimized before any surgical procedure would be safe or recommended. She would be happy to see the patient in consult.  [SP]   2251 Discussed with Dr. Jose Padilla, hospitalist at [facility]. Ultimately, the patient was accepted in transfer and will be waiting for bed. I will call back if there is any change in her condition.  [SP]   Sun Mar 23, 2025   0619 Notified by nursing staff that patient's family has been giving her meds from home. She has apparently declined what we have ordered for home meds. They have her meds in unlabeled container.  [SP]   Mon Mar 24, 2025   1527 IR was unable to perform paracentesis today secondary to high procedure volume in IR, they contacted ED to let us know there is no IR physician on call tomorrow and her paracentesis will be performed on Wednesday [JG]      ED Course User Index  [JG] Ely Aguilar MD  [RS] Harman Cavazos DO  [SP] Gretchen Castro DO         Diagnoses as of 03/24/25 1640   Cholecystitis   Hepatorenal syndrome (Multi)    Liver failure without hepatic coma, unspecified chronicity (Multi)

## 2025-03-24 NOTE — PROGRESS NOTES
Emergency Medicine Transition of Care Note.    I received Elizabeth Toledo in signout from  ***.  Please see the previous ED provider note for all HPI, PE and MDM up to the time of signout at ***. This is in addition to the primary record.    In brief Elizabeth Toledo is an 49 y.o. female presenting for   Chief Complaint   Patient presents with    Abdominal Pain     Pt was sent in from her PCP for abnormal labs, pt reportedly cirrhosis and receives a paracentesis weekly her. Pt is also reporting intermittent abdominal pain.    Abnormal Labs     At the time of signout we were awaiting: ***    ED Course as of 03/24/25 0745   Fri Mar 21, 2025   1742 Spoke with Dr. Baez of general surgery who recommends transfer to [facility] medical team due to patient's medical complexity. [RS]   2128 Spoke with Dr. Ontiveros of hepatology who does recommend pt be transferred to [facility]. [RS]   2233 Discussed with Dr. Peerz, surgery. Given MELD of 32 and acute decompensation, she would need to be medically optimized before any surgical procedure would be safe or recommended. She would be happy to see the patient in consult.  [SP]   4838 Discussed with Dr. Jose Padilla, hospitalist at [facility]. Ultimately, the patient was accepted in transfer and will be waiting for bed. I will call back if there is any change in her condition.  [SP]   Sun Mar 23, 2025   0619 Notified by nursing staff that patient's family has been giving her meds from home. She has apparently declined what we have ordered for home meds. They have her meds in unlabeled container.  [SP]      ED Course User Index  [RS] Harman Cavazos DO  [SP] Gretchen Castro DO         Diagnoses as of 03/24/25 0745   Cholecystitis   Hepatorenal syndrome (Multi)   Liver failure without hepatic coma, unspecified chronicity (Multi)       Medical Decision Making      Final diagnoses:   [K81.9] Cholecystitis   [K76.7] Hepatorenal syndrome (Multi)   [K72.90] Liver failure without hepatic coma, unspecified  chronicity (Multi)           Procedure  Procedures    Gretchen Castro DO

## 2025-03-24 NOTE — TELEPHONE ENCOUNTER
Spoke to patients mother, patient is still at [facility] ED, they are awaiting a bed at [facility] for transfer.

## 2025-03-25 ENCOUNTER — HOSPITAL ENCOUNTER (INPATIENT)
Facility: HOSPITAL | Age: 50
DRG: 432 | End: 2025-03-25
Attending: STUDENT IN AN ORGANIZED HEALTH CARE EDUCATION/TRAINING PROGRAM | Admitting: INTERNAL MEDICINE
Payer: MEDICARE

## 2025-03-25 VITALS
TEMPERATURE: 97.5 F | RESPIRATION RATE: 16 BRPM | DIASTOLIC BLOOD PRESSURE: 58 MMHG | BODY MASS INDEX: 20.24 KG/M2 | WEIGHT: 90 LBS | HEIGHT: 56 IN | OXYGEN SATURATION: 97 % | HEART RATE: 92 BPM | SYSTOLIC BLOOD PRESSURE: 112 MMHG

## 2025-03-25 DIAGNOSIS — K74.60 CIRRHOSIS (MULTI): Primary | ICD-10-CM

## 2025-03-25 DIAGNOSIS — K59.00 CONSTIPATION, UNSPECIFIED CONSTIPATION TYPE: ICD-10-CM

## 2025-03-25 LAB
ALBUMIN SERPL BCP-MCNC: 3.3 G/DL (ref 3.4–5)
ALP SERPL-CCNC: 77 U/L (ref 33–110)
ALT SERPL W P-5'-P-CCNC: 52 U/L (ref 7–45)
ANION GAP SERPL CALC-SCNC: 14 MMOL/L (ref 10–20)
AST SERPL W P-5'-P-CCNC: 103 U/L (ref 9–39)
BASOPHILS # BLD AUTO: 0.07 X10*3/UL (ref 0–0.1)
BASOPHILS NFR BLD AUTO: 0.4 %
BILIRUB SERPL-MCNC: 1.6 MG/DL (ref 0–1.2)
BUN SERPL-MCNC: 105 MG/DL (ref 6–23)
CALCIUM SERPL-MCNC: 9.2 MG/DL (ref 8.6–10.3)
CHLORIDE SERPL-SCNC: 99 MMOL/L (ref 98–107)
CO2 SERPL-SCNC: 22 MMOL/L (ref 21–32)
CREAT SERPL-MCNC: 2.91 MG/DL (ref 0.5–1.05)
EGFRCR SERPLBLD CKD-EPI 2021: 19 ML/MIN/1.73M*2
EOSINOPHIL # BLD AUTO: 0.33 X10*3/UL (ref 0–0.7)
EOSINOPHIL NFR BLD AUTO: 2 %
ERYTHROCYTE [DISTWIDTH] IN BLOOD BY AUTOMATED COUNT: 16.1 % (ref 11.5–14.5)
GLUCOSE SERPL-MCNC: 108 MG/DL (ref 74–99)
HCT VFR BLD AUTO: 26.9 % (ref 36–46)
HGB BLD-MCNC: 9.1 G/DL (ref 12–16)
IMM GRANULOCYTES # BLD AUTO: 0.2 X10*3/UL (ref 0–0.7)
IMM GRANULOCYTES NFR BLD AUTO: 1.2 % (ref 0–0.9)
LYMPHOCYTES # BLD AUTO: 1.34 X10*3/UL (ref 1.2–4.8)
LYMPHOCYTES NFR BLD AUTO: 8.2 %
MCH RBC QN AUTO: 34.6 PG (ref 26–34)
MCHC RBC AUTO-ENTMCNC: 33.8 G/DL (ref 32–36)
MCV RBC AUTO: 102 FL (ref 80–100)
MONOCYTES # BLD AUTO: 1.14 X10*3/UL (ref 0.1–1)
MONOCYTES NFR BLD AUTO: 7 %
NEUTROPHILS # BLD AUTO: 13.21 X10*3/UL (ref 1.2–7.7)
NEUTROPHILS NFR BLD AUTO: 81.2 %
NRBC BLD-RTO: 0 /100 WBCS (ref 0–0)
PLATELET # BLD AUTO: 174 X10*3/UL (ref 150–450)
POTASSIUM SERPL-SCNC: 3.8 MMOL/L (ref 3.5–5.3)
PROT SERPL-MCNC: 6.6 G/DL (ref 6.4–8.2)
RBC # BLD AUTO: 2.63 X10*6/UL (ref 4–5.2)
SODIUM SERPL-SCNC: 131 MMOL/L (ref 136–145)
WBC # BLD AUTO: 16.3 X10*3/UL (ref 4.4–11.3)

## 2025-03-25 PROCEDURE — 96372 THER/PROPH/DIAG INJ SC/IM: CPT | Performed by: NURSE PRACTITIONER

## 2025-03-25 PROCEDURE — 2500000004 HC RX 250 GENERAL PHARMACY W/ HCPCS (ALT 636 FOR OP/ED)

## 2025-03-25 PROCEDURE — 2500000001 HC RX 250 WO HCPCS SELF ADMINISTERED DRUGS (ALT 637 FOR MEDICARE OP): Performed by: EMERGENCY MEDICINE

## 2025-03-25 PROCEDURE — 85025 COMPLETE CBC W/AUTO DIFF WBC: CPT | Performed by: INTERNAL MEDICINE

## 2025-03-25 PROCEDURE — 1210000001 HC SEMI-PRIVATE ROOM DAILY

## 2025-03-25 PROCEDURE — 96366 THER/PROPH/DIAG IV INF ADDON: CPT

## 2025-03-25 PROCEDURE — 2500000002 HC RX 250 W HCPCS SELF ADMINISTERED DRUGS (ALT 637 FOR MEDICARE OP, ALT 636 FOR OP/ED): Performed by: NURSE PRACTITIONER

## 2025-03-25 PROCEDURE — 99233 SBSQ HOSP IP/OBS HIGH 50: CPT | Performed by: INTERNAL MEDICINE

## 2025-03-25 PROCEDURE — XW03367 INTRODUCTION OF TERLIPRESSIN INTO PERIPHERAL VEIN, PERCUTANEOUS APPROACH, NEW TECHNOLOGY GROUP 7: ICD-10-PCS | Performed by: STUDENT IN AN ORGANIZED HEALTH CARE EDUCATION/TRAINING PROGRAM

## 2025-03-25 PROCEDURE — 36415 COLL VENOUS BLD VENIPUNCTURE: CPT | Performed by: INTERNAL MEDICINE

## 2025-03-25 PROCEDURE — 2500000001 HC RX 250 WO HCPCS SELF ADMINISTERED DRUGS (ALT 637 FOR MEDICARE OP)

## 2025-03-25 PROCEDURE — 99222 1ST HOSP IP/OBS MODERATE 55: CPT | Performed by: INTERNAL MEDICINE

## 2025-03-25 PROCEDURE — 80053 COMPREHEN METABOLIC PANEL: CPT | Performed by: INTERNAL MEDICINE

## 2025-03-25 PROCEDURE — 2500000004 HC RX 250 GENERAL PHARMACY W/ HCPCS (ALT 636 FOR OP/ED): Performed by: NURSE PRACTITIONER

## 2025-03-25 PROCEDURE — 2500000001 HC RX 250 WO HCPCS SELF ADMINISTERED DRUGS (ALT 637 FOR MEDICARE OP): Performed by: NURSE PRACTITIONER

## 2025-03-25 RX ORDER — DIVALPROEX SODIUM 500 MG/1
500 TABLET, FILM COATED, EXTENDED RELEASE ORAL NIGHTLY
Status: DISCONTINUED | OUTPATIENT
Start: 2025-03-25 | End: 2025-03-30

## 2025-03-25 RX ORDER — LEVOTHYROXINE SODIUM 25 UG/1
25 TABLET ORAL DAILY
Status: DISCONTINUED | OUTPATIENT
Start: 2025-03-26 | End: 2025-03-26

## 2025-03-25 RX ORDER — CEFTRIAXONE 2 G/50ML
2 INJECTION, SOLUTION INTRAVENOUS EVERY 24 HOURS
Status: DISCONTINUED | OUTPATIENT
Start: 2025-03-25 | End: 2025-03-28

## 2025-03-25 RX ORDER — ARIPIPRAZOLE 15 MG/1
15 TABLET ORAL DAILY
Status: DISCONTINUED | OUTPATIENT
Start: 2025-03-26 | End: 2025-04-01 | Stop reason: HOSPADM

## 2025-03-25 RX ORDER — HEPARIN SODIUM 5000 [USP'U]/ML
5000 INJECTION, SOLUTION INTRAVENOUS; SUBCUTANEOUS EVERY 8 HOURS SCHEDULED
Status: DISCONTINUED | OUTPATIENT
Start: 2025-03-25 | End: 2025-04-01 | Stop reason: HOSPADM

## 2025-03-25 RX ORDER — FLUOXETINE HYDROCHLORIDE 20 MG/1
60 CAPSULE ORAL
Status: DISCONTINUED | OUTPATIENT
Start: 2025-03-26 | End: 2025-04-01 | Stop reason: HOSPADM

## 2025-03-25 RX ORDER — PRAVASTATIN SODIUM 20 MG/1
20 TABLET ORAL NIGHTLY
Status: DISCONTINUED | OUTPATIENT
Start: 2025-03-25 | End: 2025-03-25

## 2025-03-25 RX ADMIN — METRONIDAZOLE 500 MG: 500 INJECTION, SOLUTION INTRAVENOUS at 02:16

## 2025-03-25 RX ADMIN — HEPARIN SODIUM 5000 UNITS: 5000 INJECTION, SOLUTION INTRAVENOUS; SUBCUTANEOUS at 14:12

## 2025-03-25 RX ADMIN — FLUOXETINE HYDROCHLORIDE 60 MG: 20 CAPSULE ORAL at 08:35

## 2025-03-25 RX ADMIN — HEPARIN SODIUM 5000 UNITS: 5000 INJECTION, SOLUTION INTRAVENOUS; SUBCUTANEOUS at 05:20

## 2025-03-25 RX ADMIN — DIVALPROEX SODIUM 500 MG: 500 TABLET, FILM COATED, EXTENDED RELEASE ORAL at 21:41

## 2025-03-25 RX ADMIN — ARIPIPRAZOLE 15 MG: 10 TABLET ORAL at 08:37

## 2025-03-25 RX ADMIN — METRONIDAZOLE 500 MG: 500 INJECTION, SOLUTION INTRAVENOUS at 10:17

## 2025-03-25 RX ADMIN — CEFTRIAXONE SODIUM 2 G: 2 INJECTION, SOLUTION INTRAVENOUS at 18:37

## 2025-03-25 RX ADMIN — HEPARIN SODIUM 5000 UNITS: 5000 INJECTION, SOLUTION INTRAVENOUS; SUBCUTANEOUS at 21:41

## 2025-03-25 RX ADMIN — LEVOTHYROXINE SODIUM 25 MCG: 50 TABLET ORAL at 08:36

## 2025-03-25 SDOH — ECONOMIC STABILITY: INCOME INSECURITY: IN THE PAST 12 MONTHS HAS THE ELECTRIC, GAS, OIL, OR WATER COMPANY THREATENED TO SHUT OFF SERVICES IN YOUR HOME?: NO

## 2025-03-25 SDOH — SOCIAL STABILITY: SOCIAL INSECURITY
WITHIN THE LAST YEAR, HAVE YOU BEEN RAPED OR FORCED TO HAVE ANY KIND OF SEXUAL ACTIVITY BY YOUR PARTNER OR EX-PARTNER?: NO

## 2025-03-25 SDOH — ECONOMIC STABILITY: FOOD INSECURITY: WITHIN THE PAST 12 MONTHS, YOU WORRIED THAT YOUR FOOD WOULD RUN OUT BEFORE YOU GOT THE MONEY TO BUY MORE.: NEVER TRUE

## 2025-03-25 SDOH — SOCIAL STABILITY: SOCIAL INSECURITY: WITHIN THE LAST YEAR, HAVE YOU BEEN HUMILIATED OR EMOTIONALLY ABUSED IN OTHER WAYS BY YOUR PARTNER OR EX-PARTNER?: NO

## 2025-03-25 SDOH — SOCIAL STABILITY: SOCIAL INSECURITY: DO YOU FEEL ANYONE HAS EXPLOITED OR TAKEN ADVANTAGE OF YOU FINANCIALLY OR OF YOUR PERSONAL PROPERTY?: NO

## 2025-03-25 SDOH — SOCIAL STABILITY: SOCIAL INSECURITY: WITHIN THE LAST YEAR, HAVE YOU BEEN AFRAID OF YOUR PARTNER OR EX-PARTNER?: NO

## 2025-03-25 SDOH — ECONOMIC STABILITY: FOOD INSECURITY: WITHIN THE PAST 12 MONTHS, THE FOOD YOU BOUGHT JUST DIDN'T LAST AND YOU DIDN'T HAVE MONEY TO GET MORE.: NEVER TRUE

## 2025-03-25 SDOH — SOCIAL STABILITY: SOCIAL INSECURITY: HAVE YOU HAD THOUGHTS OF HARMING ANYONE ELSE?: NO

## 2025-03-25 SDOH — SOCIAL STABILITY: SOCIAL INSECURITY: HAS ANYONE EVER THREATENED TO HURT YOUR FAMILY OR YOUR PETS?: NO

## 2025-03-25 SDOH — SOCIAL STABILITY: SOCIAL INSECURITY: HAVE YOU HAD ANY THOUGHTS OF HARMING ANYONE ELSE?: NO

## 2025-03-25 SDOH — SOCIAL STABILITY: SOCIAL INSECURITY
WITHIN THE LAST YEAR, HAVE YOU BEEN KICKED, HIT, SLAPPED, OR OTHERWISE PHYSICALLY HURT BY YOUR PARTNER OR EX-PARTNER?: NO

## 2025-03-25 SDOH — SOCIAL STABILITY: SOCIAL INSECURITY: DOES ANYONE TRY TO KEEP YOU FROM HAVING/CONTACTING OTHER FRIENDS OR DOING THINGS OUTSIDE YOUR HOME?: NO

## 2025-03-25 SDOH — SOCIAL STABILITY: SOCIAL INSECURITY: DO YOU FEEL UNSAFE GOING BACK TO THE PLACE WHERE YOU ARE LIVING?: NO

## 2025-03-25 SDOH — SOCIAL STABILITY: SOCIAL INSECURITY: ARE THERE ANY APPARENT SIGNS OF INJURIES/BEHAVIORS THAT COULD BE RELATED TO ABUSE/NEGLECT?: NO

## 2025-03-25 SDOH — HEALTH STABILITY: PHYSICAL HEALTH: ON AVERAGE, HOW MANY MINUTES DO YOU ENGAGE IN EXERCISE AT THIS LEVEL?: 0 MIN

## 2025-03-25 SDOH — HEALTH STABILITY: PHYSICAL HEALTH: ON AVERAGE, HOW MANY DAYS PER WEEK DO YOU ENGAGE IN MODERATE TO STRENUOUS EXERCISE (LIKE A BRISK WALK)?: 0 DAYS

## 2025-03-25 SDOH — SOCIAL STABILITY: SOCIAL INSECURITY: ARE YOU OR HAVE YOU BEEN THREATENED OR ABUSED PHYSICALLY, EMOTIONALLY, OR SEXUALLY BY ANYONE?: NO

## 2025-03-25 SDOH — SOCIAL STABILITY: SOCIAL INSECURITY: WERE YOU ABLE TO COMPLETE ALL THE BEHAVIORAL HEALTH SCREENINGS?: YES

## 2025-03-25 SDOH — SOCIAL STABILITY: SOCIAL INSECURITY: ABUSE: ADULT

## 2025-03-25 ASSESSMENT — COGNITIVE AND FUNCTIONAL STATUS - GENERAL
PERSONAL GROOMING: A LITTLE
PATIENT BASELINE BEDBOUND: NO
MOVING TO AND FROM BED TO CHAIR: A LITTLE
TOILETING: A LITTLE
DRESSING REGULAR UPPER BODY CLOTHING: A LITTLE
CLIMB 3 TO 5 STEPS WITH RAILING: A LITTLE
TOILETING: A LITTLE
DRESSING REGULAR LOWER BODY CLOTHING: A LITTLE
MOVING FROM LYING ON BACK TO SITTING ON SIDE OF FLAT BED WITH BEDRAILS: A LITTLE
DAILY ACTIVITIY SCORE: 19
STANDING UP FROM CHAIR USING ARMS: A LITTLE
WALKING IN HOSPITAL ROOM: A LITTLE
TURNING FROM BACK TO SIDE WHILE IN FLAT BAD: A LITTLE
HELP NEEDED FOR BATHING: A LITTLE
DRESSING REGULAR LOWER BODY CLOTHING: A LITTLE
HELP NEEDED FOR BATHING: A LITTLE
WALKING IN HOSPITAL ROOM: A LITTLE
MOBILITY SCORE: 18
DRESSING REGULAR UPPER BODY CLOTHING: A LITTLE
PERSONAL GROOMING: A LITTLE
MOVING FROM LYING ON BACK TO SITTING ON SIDE OF FLAT BED WITH BEDRAILS: A LITTLE
MOBILITY SCORE: 18
CLIMB 3 TO 5 STEPS WITH RAILING: A LITTLE
STANDING UP FROM CHAIR USING ARMS: A LITTLE
DAILY ACTIVITIY SCORE: 19
MOVING TO AND FROM BED TO CHAIR: A LITTLE
TURNING FROM BACK TO SIDE WHILE IN FLAT BAD: A LITTLE

## 2025-03-25 ASSESSMENT — LIFESTYLE VARIABLES
HOW OFTEN DO YOU HAVE 6 OR MORE DRINKS ON ONE OCCASION: NEVER
SKIP TO QUESTIONS 9-10: 1
HOW OFTEN DO YOU HAVE A DRINK CONTAINING ALCOHOL: NEVER
HOW MANY STANDARD DRINKS CONTAINING ALCOHOL DO YOU HAVE ON A TYPICAL DAY: PATIENT DOES NOT DRINK
AUDIT-C TOTAL SCORE: 0
AUDIT-C TOTAL SCORE: 0

## 2025-03-25 ASSESSMENT — ACTIVITIES OF DAILY LIVING (ADL)
JUDGMENT_ADEQUATE_SAFELY_COMPLETE_DAILY_ACTIVITIES: NO
HEARING - RIGHT EAR: FUNCTIONAL
DRESSING YOURSELF: NEEDS ASSISTANCE
ADEQUATE_TO_COMPLETE_ADL: YES
BATHING: NEEDS ASSISTANCE
FEEDING YOURSELF: INDEPENDENT
HEARING - LEFT EAR: FUNCTIONAL
TOILETING: NEEDS ASSISTANCE
LACK_OF_TRANSPORTATION: NO
PATIENT'S MEMORY ADEQUATE TO SAFELY COMPLETE DAILY ACTIVITIES?: NO
GROOMING: NEEDS ASSISTANCE
WALKS IN HOME: NEEDS ASSISTANCE

## 2025-03-25 ASSESSMENT — PAIN SCALES - GENERAL
PAINLEVEL_OUTOF10: 0 - NO PAIN

## 2025-03-25 ASSESSMENT — PAIN - FUNCTIONAL ASSESSMENT
PAIN_FUNCTIONAL_ASSESSMENT: 0-10
PAIN_FUNCTIONAL_ASSESSMENT: 0-10

## 2025-03-25 ASSESSMENT — PATIENT HEALTH QUESTIONNAIRE - PHQ9
2. FEELING DOWN, DEPRESSED OR HOPELESS: NOT AT ALL
1. LITTLE INTEREST OR PLEASURE IN DOING THINGS: NOT AT ALL
SUM OF ALL RESPONSES TO PHQ9 QUESTIONS 1 & 2: 0

## 2025-03-25 NOTE — NURSING NOTE
Admission Note :- [place] came from  [place] for [place]. Patient is alert and oriented X3.Pt's mom is at bedside. Belongings were searched. Pt is educated about call lights , bathroom , oriented to unit . Pt came in with IV on RUSS . Bed is in lowest position.

## 2025-03-25 NOTE — CARE PLAN
Problem: Pain - Adult  Goal: Verbalizes/displays adequate comfort level or baseline comfort level  Outcome: Progressing     Problem: Safety - Adult  Goal: Free from fall injury  Outcome: Progressing     Problem: Discharge Planning  Goal: Discharge to home or other facility with appropriate resources  Outcome: Progressing     Problem: Chronic Conditions and Co-morbidities  Goal: Patient's chronic conditions and co-morbidity symptoms are monitored and maintained or improved  Outcome: Progressing     Problem: Nutrition  Goal: Nutrient intake appropriate for maintaining nutritional needs  Outcome: Progressing     Problem: Skin  Goal: Decreased wound size/increased tissue granulation at next dressing change  Outcome: Progressing  Flowsheets (Taken 3/25/2025 1811)  Decreased wound size/increased tissue granulation at next dressing change:   Promote sleep for wound healing   Protective dressings over bony prominences  Goal: Participates in plan/prevention/treatment measures  Outcome: Progressing  Flowsheets (Taken 3/25/2025 1811)  Participates in plan/prevention/treatment measures:   Discuss with provider PT/OT consult   Elevate heels  Goal: Prevent/manage excess moisture  Outcome: Progressing  Flowsheets (Taken 3/25/2025 1811)  Prevent/manage excess moisture:   Monitor for/manage infection if present   Moisturize dry skin   Cleanse incontinence/protect with barrier cream  Goal: Prevent/minimize sheer/friction injuries  Outcome: Progressing  Flowsheets (Taken 3/25/2025 1811)  Prevent/minimize sheer/friction injuries:   HOB 30 degrees or less   Turn/reposition every 2 hours/use positioning/transfer devices   Increase activity/out of bed for meals  Goal: Promote/optimize nutrition  Outcome: Progressing  Flowsheets (Taken 3/25/2025 1811)  Promote/optimize nutrition:   Assist with feeding   Monitor/record intake including meals  Goal: Promote skin healing  Outcome: Progressing  Flowsheets (Taken 3/25/2025 1811)  Promote skin  healing:   Turn/reposition every 2 hours/use positioning/transfer devices   Protective dressings over bony prominences   Assess skin/pad under line(s)/device(s)       The clinical goals for the shift include Patient  will remain safe and stable throughout the shift

## 2025-03-25 NOTE — PROGRESS NOTES
Elizabeth Toledo is a 49 y.o. female on day 0 of admission presenting with No Principal Problem: There is no principal problem currently on the Problem List. Please update the Problem List and refresh..      Subjective      Elizabeth Toledo is a 49 y.o. female with PMHx s/f liver cirrhosis, hepato renal syndrome, hypothyroidism, Rogelio syndrome, disruptive mood disorder came to the hospital with complaints of diffuse abdominal pain and reports of leukocytosis and outpatient lab work.  Lab studies demonstrated a markedly elevated BUN/creatinine suggesting DANIELLA and a reoccurrence of hepatorenal syndrome as well as hyponatremia and coagulopathy with increased liver enzymes as well.  There is significant leukocytosis with white blood cell count 25.2 the CT of the abdomen and pelvis shows liver cirrhosis and ascites as well as mild mesenteric edema ultrasound of the right upper quadrant shows the gallbladder wall to be thickened and with some positive Shannon signs.  Patient was started on IV Rocephin the emergency department contacted [facility] to arrange for transfer because there is no GI coverage here and due to the complexity of her liver pathology.  Unfortunately no bed is available at this time inpatient medicine was consulted to assist with medical management in the interim.  The patient was given boluses of albumin to help support her pressure.  Presently she is seen in the emergency department she is denying any fevers chills or rigors she does admit to some nausea 1 episode of vomiting.  She does complain of diffuse abdominal pain and increased abdominal girth.  Patient does not have any lower extremity edema is not having any diarrhea.   3/23: Patient was seen and examined.  Still awaiting bed assignment for transfer to [facility].  Creatinine continues to trend down.  Hyponatremia also improving.  Leukocytosis continues to trend down as well.  Continue current IV antibiotics.  Patient will need paracentesis with  fluid analysis to diagnose potential SBP.  Will consult IR for possible paracentesis in a.m. and fluid analysis afterwards.  Continue to trend INR CMP and CBC daily  3/24: Patient was seen and examined.  Still awaiting bed assignment for transfer to [facility].  HIDA scan this morning is not consistent with acute cholecystitis.  IR unable to do paracentesis today.  So we will continue current IV antibiotics ceftriaxone.  Fluid analysis pending.  Creatinine continues to trend down.  Liver enzymes also trending down.  Continue to monitor CBC and CMP daily  3/25: Patient was seen and examined. Abdominal pain much improved. Blood cultures are negative. Paracentesis is pending. Continue IV Abx. Creatinine trending down. Pt has been scheduled for transfer to [facility] by 2.10pm per bedside nurse. Gen medicine will sign off at transfer.  Objective     Last Recorded Vitals  /68   Pulse 74   Temp 36.4 °C (97.5 °F) (Oral)   Resp 16   Wt (!) 40.8 kg (90 lb)   SpO2 98%   Intake/Output last 3 Shifts:    Intake/Output Summary (Last 24 hours) at 3/25/2025 1342  Last data filed at 3/25/2025 0316  Gross per 24 hour   Intake 300 ml   Output --   Net 300 ml       Admission Weight  Weight: (!) 40.8 kg (90 lb) (03/21/25 1203)    Daily Weight  03/21/25 : (!) 40.8 kg (90 lb)    Image Results  NM hepatobiliary  Narrative: Interpreted By:  Dre Gage and Ohs Zachary   STUDY:  NM HEPATOBILIARY;  3/24/2025 10:41 am      INDICATION:  Signs/Symptoms:? Acute Cholecystitis.      COMPARISON:  Right upper quadrant ultrasound 03/21/2025.      ACCESSION NUMBER(S):  [id number]      ORDERING CLINICIAN:  OLIVIA CALABRESE      TECHNIQUE:  DIVISION OF NUCLEAR MEDICINE  HEPATOBILIARY SCAN (HIDA), QUANTITATIVE      The patient received an intravenous dose of  5 mCi of Tc-99m  mebrofenin (Choletec).  Sequential images of the upper abdomen were  then acquired over the next 60 minutes.      FINDINGS:  There is prompt accumulation of activity within  the liver and normal  subsequent excretion via the biliary ductal system into the small  bowel.  The gallbladder first visualizes at about  28 minutes after  radiopharmaceutical injection and progressively fills.      Impression: 1. Patency of cystic duct and common bile duct without evidence of  acute cholecystitis.      I personally reviewed the images/study and I agree with the findings  as stated by Dr. Jose Alberto Tony. This study was interpreted at  [facility], [place], [place].      MACRO:  None      Signed by: Dre Gage 3/24/2025 11:30 AM  Dictation workstation:   [id number]      Physical Exam  Vitals:    03/25/25 0643   BP: 106/68   Pulse: 74   Resp: 16   Temp:    SpO2: 98%     Constitutional:       General: She is not in acute distress.  HENT:      Head: Normocephalic and atraumatic.      Right Ear: External ear normal.      Left Ear: External ear normal.      Nose: Nose normal.      Mouth/Throat:      Mouth: Mucous membranes are moist.      Pharynx: Oropharynx is clear.   Eyes:      Extraocular Movements: Extraocular movements intact.      Conjunctiva/sclera: Conjunctivae normal.      Pupils: Pupils are equal, round, and reactive to light.   Cardiovascular:      Rate and Rhythm: Normal rate and regular rhythm.      Pulses: Normal pulses.      Heart sounds: Normal heart sounds.   Pulmonary:      Effort: Pulmonary effort is normal. No respiratory distress.      Breath sounds: Normal breath sounds. No wheezing, rhonchi or rales.   Chest:      Chest wall: No tenderness.   Abdominal:      General: Bowel sounds are normal. There is distension.      Palpations: Abdomen is soft. There is no mass.      Tenderness: There is abdominal tenderness. There is no rebound.   Musculoskeletal:         General: No swelling or deformity. Normal range of motion.      Cervical back: Normal range of motion.   Skin:     General: Skin is warm and dry.      Capillary Refill: Capillary refill takes  less than 2 seconds.   Neurological:      General: No focal deficit present.      Mental Status: She is alert and oriented to person, place, and time.   Psychiatric:         Mood and Affect: Mood normal.         Behavior: Behavior normal.      Relevant Results               Assessment/Plan                  Assessment & Plan        Abdominal pain? Acute Cholecyctitis  cirrhosis and ascites with mesenteric edema, sepsis w DANIELLA  HIDA scan not consistent with acute cholecystitis  Continue IV antibiotics ceftriaxone and Flagyl  Blood cultures are negative x 2 day  We will hold any hepatotoxic medications for now  Plan is to have the [place] [facility] [place] available    ?  Spontaneous bacterial peritonitis  Currently on IV antibiotic ceftriaxone  IR consulted for possible paracentesis pending  Fluid analysis pending paracentesis  Blood cultures negative x 1 day  Trend CBC daily       DANIELLA on CKD  Improving  Etiology unclear; possibly prerenal versus hepatorenal syndrome  Responding to initial IV albumin bolus  Will continue as needed IV fluids plus albumin  Urine creatinine urine urea and urine sodium to calculate FeNa/Fe urea  Continue to Hold lasix and aldactone  Will avoid nephrotoxins  Consider nephrology consult     Anemia stable  Stable so far no signs of active bleeding  Likely anemia of chronic disease in the setting of sepsis  Will get iron panel ferritin vitamin B12 and folate  Replenish as needed     Hypothyroidism  Resume home levothyroxine     Disruptive mood disorder  Continue her Depakote and Abilify to avoid withdrawal symptoms     Dyslipidemia  Hold statins for now     VTE prophylaxis  Subcutaneous heparin  Monitor platelets closely          No new Assessment & Plan notes have been filed under this hospital service since the last note was generated.  Service: Internal Medicine       Spent 35 minutes in follow-up management of this patient today       Luis F Burgess MD

## 2025-03-25 NOTE — PROGRESS NOTES
I have accept care of this patient in signout.    In summary:  This is a 49-year-old female who has been pending transfer to [facility] for 93 hours.  [facility] states that they do not have a bed at this time I reached out to Dr. Burgess who states that she is now fast-track to get a bed and states that if she is still here in the morning tomorrow he will admit but he is hopeful that she will get a bed today so does not want to admit at this time.  She has been improving her hepatorenal she is continuing her antibiotics for her acute Samaria this did not have any abdominal pain at my evaluation.        Labs Reviewed   CBC WITH AUTO DIFFERENTIAL - Abnormal       Result Value    WBC 25.2 (*)     nRBC 0.0      RBC 3.48 (*)     Hemoglobin 12.2      Hematocrit 34.5 (*)     MCV 99      MCH 35.1 (*)     MCHC 35.4      RDW 16.1 (*)     Platelets 352      Neutrophils % 87.6      Immature Granulocytes %, Automated 1.2 (*)     Lymphocytes % 5.4      Monocytes % 5.3      Eosinophils % 0.2      Basophils % 0.3      Neutrophils Absolute 22.11 (*)     Immature Granulocytes Absolute, Automated 0.31      Lymphocytes Absolute 1.35      Monocytes Absolute 1.33 (*)     Eosinophils Absolute 0.05      Basophils Absolute 0.08     COMPREHENSIVE METABOLIC PANEL - Abnormal    Glucose 137 (*)     Sodium 128 (*)     Potassium 5.2      Chloride 92 (*)     Bicarbonate 22      Anion Gap 19      Urea Nitrogen 140 (*)     Creatinine 4.66 (*)     eGFR 11 (*)     Calcium 9.7      Albumin 3.1 (*)     Alkaline Phosphatase 99      Total Protein 7.2       (*)     Bilirubin, Total 3.0 (*)     ALT 67 (*)    LIPASE - Abnormal    Lipase 143 (*)     Narrative:     Venipuncture immediately after or during the administration of Metamizole may lead to falsely low results. Testing should be performed immediately prior to Metamizole dosing.   PROTIME-INR - Abnormal    Protime 16.5 (*)     INR 1.5 (*)    URINALYSIS WITH REFLEX CULTURE AND MICROSCOPIC - Abnormal    Color,  Urine Yellow      Appearance, Urine Clear      Specific Gravity, Urine 1.014      pH, Urine 5.0      Protein, Urine NEGATIVE      Glucose, Urine Normal      Blood, Urine NEGATIVE      Ketones, Urine NEGATIVE      Bilirubin, Urine NEGATIVE      Urobilinogen, Urine Normal      Nitrite, Urine NEGATIVE      Leukocyte Esterase, Urine 250 Daphne/uL (*)    MICROSCOPIC ONLY, URINE - Abnormal    WBC, Urine 1-5      RBC, Urine 1-2      Squamous Epithelial Cells, Urine 1-9 (SPARSE)      Mucus, Urine FEW      Hyaline Casts, Urine 3+ (*)    CBC - Abnormal    WBC 15.3 (*)     nRBC 0.0      RBC 2.65 (*)     Hemoglobin 9.3 (*)     Hematocrit 26.4 (*)           MCH 35.1 (*)     MCHC 35.2      RDW 16.4 (*)     Platelets 262     BASIC METABOLIC PANEL - Abnormal    Glucose 102 (*)     Sodium 133 (*)     Potassium 5.0      Chloride 95 (*)     Bicarbonate 23      Anion Gap 20      Urea Nitrogen 138 (*)     Creatinine 4.04 (*)     eGFR 13 (*)     Calcium 9.6     HEPATIC FUNCTION PANEL - Abnormal    Albumin 3.6      Bilirubin, Total 2.3 (*)     Bilirubin, Direct 1.2 (*)     Alkaline Phosphatase 90      ALT 54 (*)     AST 97 (*)     Total Protein 7.2     CBC - Abnormal    WBC 14.6 (*)     nRBC 0.0      RBC 2.59 (*)     Hemoglobin 9.1 (*)     Hematocrit 26.0 (*)           MCH 35.1 (*)     MCHC 35.0      RDW 16.5 (*)     Platelets 239     PROTIME-INR - Abnormal    Protime 20.3 (*)     INR 1.8 (*)    COMPREHENSIVE METABOLIC PANEL - Abnormal    Glucose 101 (*)     Sodium 133 (*)     Potassium 4.1      Chloride 99      Bicarbonate 23      Anion Gap 15      Urea Nitrogen 119 (*)     Creatinine 3.63 (*)     eGFR 15 (*)     Calcium 9.2      Albumin 3.5      Alkaline Phosphatase 79      Total Protein 6.5      AST 87 (*)     Bilirubin, Total 1.6 (*)     ALT 46 (*)    IRON AND TIBC - Abnormal    Iron 60      UIBC 68 (*)     TIBC 128 (*)     % Saturation 47 (*)    FERRITIN - Abnormal    Ferritin 648 (*)    VITAMIN B12 - Abnormal    Vitamin  B12 >2,000 (*)    COMPREHENSIVE METABOLIC PANEL - Abnormal    Glucose 103 (*)     Sodium 133 (*)     Potassium 4.0      Chloride 99      Bicarbonate 22      Anion Gap 16      Urea Nitrogen 112 (*)     Creatinine 3.16 (*)     eGFR 17 (*)     Calcium 9.3      Albumin 3.4      Alkaline Phosphatase 81      Total Protein 6.7       (*)     Bilirubin, Total 1.7 (*)     ALT 52 (*)    CBC WITH AUTO DIFFERENTIAL - Abnormal    WBC 16.0 (*)     nRBC 0.0      RBC 2.71 (*)     Hemoglobin 9.4 (*)     Hematocrit 27.4 (*)      (*)     MCH 34.7 (*)     MCHC 34.3      RDW 16.0 (*)     Platelets 162      Neutrophils % 80.3      Immature Granulocytes %, Automated 1.2 (*)     Lymphocytes % 9.8      Monocytes % 6.9      Eosinophils % 1.4      Basophils % 0.4      Neutrophils Absolute 12.88 (*)     Immature Granulocytes Absolute, Automated 0.19      Lymphocytes Absolute 1.57      Monocytes Absolute 1.11 (*)     Eosinophils Absolute 0.23      Basophils Absolute 0.06     COMPREHENSIVE METABOLIC PANEL - Abnormal    Glucose 108 (*)     Sodium 131 (*)     Potassium 3.8      Chloride 99      Bicarbonate 22      Anion Gap 14      Urea Nitrogen 105 (*)     Creatinine 2.91 (*)     eGFR 19 (*)     Calcium 9.2      Albumin 3.3 (*)     Alkaline Phosphatase 77      Total Protein 6.6       (*)     Bilirubin, Total 1.6 (*)     ALT 52 (*)    CBC WITH AUTO DIFFERENTIAL - Abnormal    WBC 16.3 (*)     nRBC 0.0      RBC 2.63 (*)     Hemoglobin 9.1 (*)     Hematocrit 26.9 (*)      (*)     MCH 34.6 (*)     MCHC 33.8      RDW 16.1 (*)     Platelets 174      Neutrophils % 81.2      Immature Granulocytes %, Automated 1.2 (*)     Lymphocytes % 8.2      Monocytes % 7.0      Eosinophils % 2.0      Basophils % 0.4      Neutrophils Absolute 13.21 (*)     Immature Granulocytes Absolute, Automated 0.20      Lymphocytes Absolute 1.34      Monocytes Absolute 1.14 (*)     Eosinophils Absolute 0.33      Basophils Absolute 0.07     BLOOD CULTURE -  Normal    Blood Culture No growth at 3 days     BLOOD CULTURE - Normal    Blood Culture No growth at 3 days     URINE CULTURE - Normal    Urine Culture        Value: Clinically insignificant growth based on current clinical standards.   LACTATE - Normal    Lactate 1.8      Narrative:     Venipuncture immediately after or during the administration of Metamizole may lead to falsely low results. Testing should be performed immediately prior to Metamizole dosing.   SERIAL TROPONIN-INITIAL - Normal    Troponin I, High Sensitivity 8      Narrative:     Less than 99th percentile of normal range cutoff-  Female and children under 18 years old <14 ng/L; Male <21 ng/L: Negative  Repeat testing should be performed if clinically indicated.     Female and children under 18 years old 14-50 ng/L; Male 21-50 ng/L:  Consistent with possible cardiac damage and possible increased clinical   risk. Serial measurements may help to assess extent of myocardial damage.     >50 ng/L: Consistent with cardiac damage, increased clinical risk and  myocardial infarction. Serial measurements may help assess extent of   myocardial damage.      NOTE: Children less than 1 year old may have higher baseline troponin   levels and results should be interpreted in conjunction with the overall   clinical context.     NOTE: Troponin I testing is performed using a different   testing methodology at [facility] than at other   [facility]. Direct result comparisons should only   be made within the same method.   SERIAL TROPONIN, 1 HOUR - Normal    Troponin I, High Sensitivity 8      Narrative:     Less than 99th percentile of normal range cutoff-  Female and children under 18 years old <14 ng/L; Male <21 ng/L: Negative  Repeat testing should be performed if clinically indicated.     Female and children under 18 years old 14-50 ng/L; Male 21-50 ng/L:  Consistent with possible cardiac damage and possible increased clinical   risk. Serial  measurements may help to assess extent of myocardial damage.     >50 ng/L: Consistent with cardiac damage, increased clinical risk and  myocardial infarction. Serial measurements may help assess extent of   myocardial damage.      NOTE: Children less than 1 year old may have higher baseline troponin   levels and results should be interpreted in conjunction with the overall   clinical context.     NOTE: Troponin I testing is performed using a different   testing methodology at [facility] than at other   [facility]. Direct result comparisons should only   be made within the same method.   SERIAL TROPONIN, 1 HOUR - Normal    Troponin I, High Sensitivity 8      Narrative:     Less than 99th percentile of normal range cutoff-  Female and children under 18 years old <14 ng/L; Male <21 ng/L: Negative  Repeat testing should be performed if clinically indicated.     Female and children under 18 years old 14-50 ng/L; Male 21-50 ng/L:  Consistent with possible cardiac damage and possible increased clinical   risk. Serial measurements may help to assess extent of myocardial damage.     >50 ng/L: Consistent with cardiac damage, increased clinical risk and  myocardial infarction. Serial measurements may help assess extent of   myocardial damage.      NOTE: Children less than 1 year old may have higher baseline troponin   levels and results should be interpreted in conjunction with the overall   clinical context.     NOTE: Troponin I testing is performed using a different   testing methodology at [facility] than at other   [facility]. Direct result comparisons should only   be made within the same method.   FOLATE - Normal    Folate, Serum 5.7      Narrative:     Low           <3.4  Borderline 3.4-5.0  Normal        >5.0    Patients receiving more than 5 mg/day of biotin may have interference in test results. A sample should be taken no sooner than eight hours after previous dose. Contact  the testing laboratory for additional information.    LACTATE DEHYDROGENASE - Normal         STERILE FLUID CULTURE/SMEAR   TROPONIN SERIES- (INITIAL, 1 HR)    Narrative:     The following orders were created for panel order Troponin I Series, High Sensitivity (0, 1 HR).  Procedure                               Abnormality         Status                     ---------                               -----------         ------                     Troponin I, High Sensiti...[[id number]]  Normal              Final result               Troponin, High Sensitivi...[[id number]]  Normal              Final result                 Please view results for these tests on the individual orders.   URINALYSIS WITH REFLEX CULTURE AND MICROSCOPIC    Narrative:     The following orders were created for panel order Urinalysis with Reflex Culture and Microscopic.  Procedure                               Abnormality         Status                     ---------                               -----------         ------                     Urinalysis with Reflex C...[[id number]]  Abnormal            Final result               Extra Urine Gray Tube[[id number]]                            Final result                 Please view results for these tests on the individual orders.   EXTRA URINE GRAY TUBE    Extra Tube Hold for add-ons.     UREA NITROGEN, URINE RANDOM    Urea Nitrogen, Urine Random 707      Creatinine, Urine Random 104.3      Urea Nitrogen/Creatinine Ratio 6.8     SODIUM, URINE RANDOM    Sodium, Urine Random <10      Creatinine, Urine Random 104.3      Sodium/Creatinine Ratio       PROTEIN, TOTAL, BODY FLUID    Narrative:     The following orders were created for panel order Protein, Total, Body Fluid.  Procedure                               Abnormality         Status                     ---------                               -----------         ------                     Protein, Total, Body Fluid[[id number]]                                                     Please view results for these tests on the individual orders.   LACTATE DEHYDROGENASE, BODY FLUID    Narrative:     The following orders were created for panel order Lactate Dehydrogenase, Body Fluid.  Procedure                               Abnormality         Status                     ---------                               -----------         ------                     Lactate Dehydrogenase, B...[[id number]]                                                   Please view results for these tests on the individual orders.   BODY FLUID CELL COUNT WITH DIFFERENTIAL    Narrative:     The following orders were created for panel order Body Fluid Cell Count With Differential.  Procedure                               Abnormality         Status                     ---------                               -----------         ------                     Body Fluid Cell Count[[id number]]                                                       Body Fluid Differential[[id number]]                                                       Please view results for these tests on the individual orders.   PROTEIN, TOTAL, BODY FLUID   LACTATE DEHYDROGENASE, BODY FLUID   BODY FLUID CELL COUNT   BODY FLUID CELL DIFFERENTIAL     NM hepatobiliary   Final Result   1. Patency of cystic duct and common bile duct without evidence of   acute cholecystitis.        I personally reviewed the images/study and I agree with the findings   as stated by Dr. Jose Alberto Tony. This study was interpreted at   [facility], [place], [place].        MACRO:   None        Signed by: Dre Gage 3/24/2025 11:30 AM   Dictation workstation:   [id number]      US gallbladder   Final Result   Hepatomegaly with cirrhotic morphology liver. Associated moderate   volume ascites.        Gallbladder wall thickening with gallbladder sludge and a positive   sonographic Shannon sign reported by sonographer. Constellation of    findings warrants clinical exclusion of superimposed cholecystitis.   Consider gallbladder nuclear medicine scintigraphy for further   assessment.        MACRO:   None             Signed by: Jerry Donald 3/21/2025 5:16 PM   Dictation workstation:   [id number]      XR chest 2 views   Final Result   No acute cardiopulmonary disease.        Signed by: Lesley Benjamin 3/21/2025 1:37 PM   Dictation workstation:   [id number]      US guided abdominal paracentesis    (Results Pending)

## 2025-03-25 NOTE — H&P
"History Of Present Illness  Elizabeth Toledo is a 49 y.o. female with PMHx s/f liver cirrhosis, hepato renal syndrome, hypothyroidism, Rogelio syndrome, disruptive mood disorder came to the OSH with complaints of diffuse abdominal pain and reports of leukocytosis and outpatient lab work. Lab studies demonstrated a markedly elevated BUN/creatinine suggesting DANIELLA and a reoccurrence of hepatorenal syndrome as well as hyponatremia and coagulopathy with increased liver enzymes as well. There is significant leukocytosis with white blood cell count 25.2 the CT of the abdomen and pelvis shows liver cirrhosis and ascites as well as mild mesenteric edema ultrasound of the right upper quadrant shows the gallbladder wall to be thickened and with some positive Shannon signs. Patient was started on IV Rocephin. Pt was admitted to medicine at the OSH Creatinine continues to trend down. Hyponatremia also improving. Leukocytosis continues to trend down as well. HIDA scan was done 03/24 and it is not consistent with acute cholecystitis. Due to no GI coverage at the OSH pt was transferred to [facility] [place] for further evaluation and management.     Vital signs  /61   Pulse 85   Temp 36.7 °C (98.1 °F) (Temporal)   Resp 16   Ht 1.422 m (4' 7.98\")   Wt (!) 43.1 kg (95 lb 1.6 oz)   SpO2 95%   BMI 21.33 kg/m²     Labs 3/25:  Wbc:16.3 hgb 9.1 PLT:174   NA:131, K:3.8 cr:2.9,    AST:103 ALT 52 ALK phos:77 bill 1.6     Imaging:  HIDA scan 3/24   1. Patency of cystic duct and common bile duct without evidence of  acute cholecystitis.    Gallbladder US 3/21   IMPRESSION:  Hepatomegaly with cirrhotic morphology liver. Associated moderate  volume ascites.      Gallbladder wall thickening with gallbladder sludge and a positive  sonographic Shannon sign reported by sonographer. Constellation of  findings warrants clinical exclusion of superimposed cholecystitis.  Consider gallbladder nuclear medicine scintigraphy for further  assessment.   "   Past Medical History  Past Medical History:   Diagnosis Date    Cirrhosis (Multi)     Disruptive mood dysregulation disorder (Multi)     Hyperlipidemia     Hypothyroidism     Rogelio syndrome (HHS-HCC)        Surgical History  Past Surgical History:   Procedure Laterality Date    US GUIDED NEEDLE LIVER BIOPSY  8/2/2023    US GUIDED NEEDLE LIVER BIOPSY 8/2/2023 [facility]        Social History  She reports that she has never smoked. She has never been exposed to tobacco smoke. She has never used smokeless tobacco. She reports that she does not drink alcohol and does not use drugs.    Family History  Family History   Problem Relation Name Age of Onset    Diabetes Mother      Other (prediabetes) Father          Allergies  Patient has no known allergies.    Review of Systems   Per HPI     Physical Exam  Vitals reviewed.   Constitutional:       General: She is not in acute distress.  HENT:      Head: Normocephalic and atraumatic.      Right Ear: External ear normal.      Left Ear: External ear normal.      Nose: Nose normal.      Mouth/Throat:      Mouth: Mucous membranes are moist.      Pharynx: Oropharynx is clear.   Eyes:      Extraocular Movements: Extraocular movements intact.      Conjunctiva/sclera: Conjunctivae normal.      Pupils: Pupils are equal, round, and reactive to light.   Cardiovascular:      Rate and Rhythm: Normal rate and regular rhythm.      Pulses: Normal pulses.      Heart sounds: Normal heart sounds.   Pulmonary:      Effort: Pulmonary effort is normal. No respiratory distress.      Breath sounds: Normal breath sounds. No wheezing, rhonchi or rales.   Chest:      Chest wall: No tenderness.   Abdominal:      General: Bowel sounds are normal. There is distension.      Palpations: Abdomen is soft. There is no mass.      Tenderness: There is abdominal tenderness. There is no rebound.   Musculoskeletal:         General: No swelling or deformity. Normal range of motion.      Cervical back: Normal range  of motion.   Skin:     General: Skin is warm and dry.      Capillary Refill: Capillary refill takes less than 2 seconds.   Neurological:      General: No focal deficit present.      Mental Status: She is alert and oriented to person, place, and time.   Psychiatric:         Mood and Affect: Mood normal.         Behavior: Behavior normal.         Last Recorded Vitals  Blood pressure 100/61, pulse 85, temperature 36.7 °C (98.1 °F), resp. rate 16, SpO2 95%.    Relevant Results  Results for orders placed or performed during the hospital encounter of 03/21/25 (from the past 24 hours)   Comprehensive Metabolic Panel   Result Value Ref Range    Glucose 108 (H) 74 - 99 mg/dL    Sodium 131 (L) 136 - 145 mmol/L    Potassium 3.8 3.5 - 5.3 mmol/L    Chloride 99 98 - 107 mmol/L    Bicarbonate 22 21 - 32 mmol/L    Anion Gap 14 10 - 20 mmol/L    Urea Nitrogen 105 (HH) 6 - 23 mg/dL    Creatinine 2.91 (H) 0.50 - 1.05 mg/dL    eGFR 19 (L) >60 mL/min/1.73m*2    Calcium 9.2 8.6 - 10.3 mg/dL    Albumin 3.3 (L) 3.4 - 5.0 g/dL    Alkaline Phosphatase 77 33 - 110 U/L    Total Protein 6.6 6.4 - 8.2 g/dL     (H) 9 - 39 U/L    Bilirubin, Total 1.6 (H) 0.0 - 1.2 mg/dL    ALT 52 (H) 7 - 45 U/L   CBC and Auto Differential   Result Value Ref Range    WBC 16.3 (H) 4.4 - 11.3 x10*3/uL    nRBC 0.0 0.0 - 0.0 /100 WBCs    RBC 2.63 (L) 4.00 - 5.20 x10*6/uL    Hemoglobin 9.1 (L) 12.0 - 16.0 g/dL    Hematocrit 26.9 (L) 36.0 - 46.0 %     (H) 80 - 100 fL    MCH 34.6 (H) 26.0 - 34.0 pg    MCHC 33.8 32.0 - 36.0 g/dL    RDW 16.1 (H) 11.5 - 14.5 %    Platelets 174 150 - 450 x10*3/uL    Neutrophils % 81.2 40.0 - 80.0 %    Immature Granulocytes %, Automated 1.2 (H) 0.0 - 0.9 %    Lymphocytes % 8.2 13.0 - 44.0 %    Monocytes % 7.0 2.0 - 10.0 %    Eosinophils % 2.0 0.0 - 6.0 %    Basophils % 0.4 0.0 - 2.0 %    Neutrophils Absolute 13.21 (H) 1.20 - 7.70 x10*3/uL    Immature Granulocytes Absolute, Automated 0.20 0.00 - 0.70 x10*3/uL    Lymphocytes  Absolute 1.34 1.20 - 4.80 x10*3/uL    Monocytes Absolute 1.14 (H) 0.10 - 1.00 x10*3/uL    Eosinophils Absolute 0.33 0.00 - 0.70 x10*3/uL    Basophils Absolute 0.07 0.00 - 0.10 x10*3/uL      Assessment/Plan   49-year-old F with PMH of 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 Hospitalist at OSH not amenable to admission given no GI/hepatology coverage currently. Transfer to [facility] [place] for management of decompensated cirrhosis and hepatorenal syndrome.     #Abdominal pain   #Decompensated cirrhosis   -pt presneted with abdominal pain  -gallbladder us showed signs of cholecystitis, HIDA scan negative for cholecystitis   -pt was started on rocephin 3/21, WBC trended down   -general surgery consulted at the OSH recommend medical optimization.   -admit to medicine  -c/w rocephin   -consult general surgery in the am  -consult hepatology in the am   -Paracentesis in the am to rule out SBP   -avoid hepatotoxic meds     #DANIELLA on CKD   -unclear etiology could be hepatorenal, prerenal or mixed   -Continue to Hold lasix and aldactone   -avoid nephrotixic medications and contrast  -consider nephrology consult in the am       #Anemia stable  -no signs of active bleeding  -Likely anemia of chronic disease      Hypothyroidism  Resume home levothyroxine     Disruptive mood disorder  Continue her Depakote and Abilify to avoid withdrawal symptoms       F: cautious given cirrhosis   E:PRN  N:clear liquid  A:PIV  Code status: full code  DVT ppx: heparin subQ   Assessment & Plan  Cirrhosis (Multi)             I spent 30 minutes in the professional and overall care of this patient.      Dylan Cardenas MD      Patient is a 49-year-old F with a PMH of Rogelio syndrome, developmental delay, cognitive and behavioral abnormalities, MASH liver cirrhosis followed by  hepatology requiring frequent paracenteses, HLD, hypothyroidism, who is presented to [facility] for abnormal labs and abdominal pain. She was started on Iv Antibiotics for suspected SBP.  HIDA scan was negative for any evidence of cholecystitis. She is transferred to [facility] for management of decompensated cirrhosis, suspected SBP and DANIELLA.       Plan:   Plan for Paracentesis on 3/26  Follow ascitic fluid analysis  Hepatology consult,   Continue to monitor MELD labs,    Admission creatinine is 2.91, ( base line 1.0)   DD includes dehydration vs Hepatorenal syndrome,   Urine sodium less than 10,   -Continue to Hold lasix and aldactone   -avoid nephrotixic medications and contrast    I have seen and examined the patient, lab work and imaging reviewed independently of the Resident. I agree with assessment and plan. I have personally performed a substantive portion of the encounter.      Marshall Boston MD

## 2025-03-26 ENCOUNTER — APPOINTMENT (OUTPATIENT)
Dept: RADIOLOGY | Facility: HOSPITAL | Age: 50
DRG: 432 | End: 2025-03-26
Payer: MEDICARE

## 2025-03-26 LAB
ALBUMIN FLD-MCNC: 0.8 G/DL
ALBUMIN SERPL BCP-MCNC: 3.8 G/DL (ref 3.4–5)
ALP SERPL-CCNC: 113 U/L (ref 33–110)
ALT SERPL W P-5'-P-CCNC: 59 U/L (ref 7–45)
ANION GAP SERPL CALC-SCNC: 22 MMOL/L (ref 10–20)
APTT PPP: 61 SECONDS (ref 26–36)
AST SERPL W P-5'-P-CCNC: 114 U/L (ref 9–39)
BACTERIA BLD CULT: NORMAL
BACTERIA BLD CULT: NORMAL
BASOPHILS NFR FLD MANUAL: 0 %
BILIRUB SERPL-MCNC: 1.6 MG/DL (ref 0–1.2)
BLASTS NFR FLD MANUAL: 0 %
BUN SERPL-MCNC: 100 MG/DL (ref 6–23)
CALCIUM SERPL-MCNC: 10.2 MG/DL (ref 8.6–10.6)
CHLORIDE SERPL-SCNC: 98 MMOL/L (ref 98–107)
CLARITY FLD: CLEAR
CO2 SERPL-SCNC: 20 MMOL/L (ref 21–32)
COLOR FLD: YELLOW
CREAT SERPL-MCNC: 2.87 MG/DL (ref 0.5–1.05)
EGFRCR SERPLBLD CKD-EPI 2021: 19 ML/MIN/1.73M*2
EOSINOPHIL NFR FLD MANUAL: 3 %
ERYTHROCYTE [DISTWIDTH] IN BLOOD BY AUTOMATED COUNT: 16.5 % (ref 11.5–14.5)
GLUCOSE FLD-MCNC: 160 MG/DL
GLUCOSE SERPL-MCNC: 103 MG/DL (ref 74–99)
HCT VFR BLD AUTO: 31.8 % (ref 36–46)
HGB BLD-MCNC: 10.8 G/DL (ref 12–16)
IMMATURE GRANULOCYTES IN FLUID: 0 %
INR PPP: 2 (ref 0.9–1.1)
LYMPHOCYTES NFR FLD MANUAL: 35 %
MCH RBC QN AUTO: 35.8 PG (ref 26–34)
MCHC RBC AUTO-ENTMCNC: 34 G/DL (ref 32–36)
MCV RBC AUTO: 105 FL (ref 80–100)
MONOS+MACROS NFR FLD MANUAL: 57 %
NEUTROPHILS NFR FLD MANUAL: 5 %
NRBC BLD-RTO: 0 /100 WBCS (ref 0–0)
OTHER CELLS NFR FLD MANUAL: 0 %
PLASMA CELLS NFR FLD MANUAL: 0 %
PLATELET # BLD AUTO: 224 X10*3/UL (ref 150–450)
POTASSIUM SERPL-SCNC: 3.9 MMOL/L (ref 3.5–5.3)
PROT SERPL-MCNC: 8 G/DL (ref 6.4–8.2)
PROTHROMBIN TIME: 22.4 SECONDS (ref 9.8–12.4)
RBC # BLD AUTO: 3.02 X10*6/UL (ref 4–5.2)
RBC # FLD AUTO: 40 /UL
SODIUM SERPL-SCNC: 136 MMOL/L (ref 136–145)
TOTAL CELLS COUNTED FLD: 100
WBC # BLD AUTO: 21.5 X10*3/UL (ref 4.4–11.3)
WBC # FLD AUTO: 39 /UL

## 2025-03-26 PROCEDURE — 99221 1ST HOSP IP/OBS SF/LOW 40: CPT | Performed by: STUDENT IN AN ORGANIZED HEALTH CARE EDUCATION/TRAINING PROGRAM

## 2025-03-26 PROCEDURE — 2500000001 HC RX 250 WO HCPCS SELF ADMINISTERED DRUGS (ALT 637 FOR MEDICARE OP): Performed by: INTERNAL MEDICINE

## 2025-03-26 PROCEDURE — 89051 BODY FLUID CELL COUNT: CPT

## 2025-03-26 PROCEDURE — 82945 GLUCOSE OTHER FLUID: CPT

## 2025-03-26 PROCEDURE — 80053 COMPREHEN METABOLIC PANEL: CPT

## 2025-03-26 PROCEDURE — 99221 1ST HOSP IP/OBS SF/LOW 40: CPT | Performed by: NURSE PRACTITIONER

## 2025-03-26 PROCEDURE — 82042 OTHER SOURCE ALBUMIN QUAN EA: CPT

## 2025-03-26 PROCEDURE — 2500000004 HC RX 250 GENERAL PHARMACY W/ HCPCS (ALT 636 FOR OP/ED): Performed by: INTERNAL MEDICINE

## 2025-03-26 PROCEDURE — 99232 SBSQ HOSP IP/OBS MODERATE 35: CPT | Performed by: INTERNAL MEDICINE

## 2025-03-26 PROCEDURE — 85730 THROMBOPLASTIN TIME PARTIAL: CPT

## 2025-03-26 PROCEDURE — 2500000001 HC RX 250 WO HCPCS SELF ADMINISTERED DRUGS (ALT 637 FOR MEDICARE OP)

## 2025-03-26 PROCEDURE — 2500000004 HC RX 250 GENERAL PHARMACY W/ HCPCS (ALT 636 FOR OP/ED)

## 2025-03-26 PROCEDURE — 36415 COLL VENOUS BLD VENIPUNCTURE: CPT

## 2025-03-26 PROCEDURE — 0W9G3ZZ DRAINAGE OF PERITONEAL CAVITY, PERCUTANEOUS APPROACH: ICD-10-PCS | Performed by: NURSE PRACTITIONER

## 2025-03-26 PROCEDURE — 85027 COMPLETE CBC AUTOMATED: CPT

## 2025-03-26 PROCEDURE — 1210000001 HC SEMI-PRIVATE ROOM DAILY

## 2025-03-26 PROCEDURE — 87070 CULTURE OTHR SPECIMN AEROBIC: CPT | Performed by: INTERNAL MEDICINE

## 2025-03-26 PROCEDURE — 49083 ABD PARACENTESIS W/IMAGING: CPT

## 2025-03-26 RX ORDER — LEVOTHYROXINE SODIUM 25 UG/1
25 TABLET ORAL DAILY
Status: DISCONTINUED | OUTPATIENT
Start: 2025-03-27 | End: 2025-04-01 | Stop reason: HOSPADM

## 2025-03-26 RX ORDER — ZINC SULFATE 50(220)MG
50 CAPSULE ORAL 2 TIMES DAILY
Status: DISCONTINUED | OUTPATIENT
Start: 2025-03-26 | End: 2025-04-01 | Stop reason: HOSPADM

## 2025-03-26 RX ORDER — ALBUMIN HUMAN 250 G/1000ML
100 SOLUTION INTRAVENOUS 3 TIMES DAILY
Status: DISCONTINUED | OUTPATIENT
Start: 2025-03-28 | End: 2025-03-27

## 2025-03-26 RX ADMIN — PHYTONADIONE 10 MG: 10 INJECTION, EMULSION INTRAMUSCULAR; INTRAVENOUS; SUBCUTANEOUS at 22:22

## 2025-03-26 RX ADMIN — ZINC SULFATE 220 MG (50 MG) CAPSULE 50 MG OF ELEMENTAL ZINC: CAPSULE at 22:03

## 2025-03-26 RX ADMIN — CEFTRIAXONE SODIUM 2 G: 2 INJECTION, SOLUTION INTRAVENOUS at 17:18

## 2025-03-26 RX ADMIN — HEPARIN SODIUM 5000 UNITS: 5000 INJECTION, SOLUTION INTRAVENOUS; SUBCUTANEOUS at 15:14

## 2025-03-26 RX ADMIN — LEVOTHYROXINE SODIUM 25 MCG: 25 TABLET ORAL at 08:24

## 2025-03-26 RX ADMIN — DIVALPROEX SODIUM 500 MG: 500 TABLET, FILM COATED, EXTENDED RELEASE ORAL at 22:03

## 2025-03-26 RX ADMIN — FLUOXETINE HYDROCHLORIDE 60 MG: 20 CAPSULE ORAL at 06:29

## 2025-03-26 RX ADMIN — HEPARIN SODIUM 5000 UNITS: 5000 INJECTION, SOLUTION INTRAVENOUS; SUBCUTANEOUS at 22:03

## 2025-03-26 RX ADMIN — ARIPIPRAZOLE 15 MG: 15 TABLET ORAL at 08:25

## 2025-03-26 RX ADMIN — HEPARIN SODIUM 5000 UNITS: 5000 INJECTION, SOLUTION INTRAVENOUS; SUBCUTANEOUS at 07:40

## 2025-03-26 RX ADMIN — SODIUM CHLORIDE, SODIUM LACTATE, POTASSIUM CHLORIDE, AND CALCIUM CHLORIDE 500 ML: .6; .31; .03; .02 INJECTION, SOLUTION INTRAVENOUS at 08:39

## 2025-03-26 ASSESSMENT — COGNITIVE AND FUNCTIONAL STATUS - GENERAL
HELP NEEDED FOR BATHING: A LITTLE
MOVING FROM LYING ON BACK TO SITTING ON SIDE OF FLAT BED WITH BEDRAILS: A LITTLE
STANDING UP FROM CHAIR USING ARMS: A LITTLE
CLIMB 3 TO 5 STEPS WITH RAILING: A LITTLE
DAILY ACTIVITIY SCORE: 19
MOBILITY SCORE: 18
MOVING TO AND FROM BED TO CHAIR: A LITTLE
DRESSING REGULAR UPPER BODY CLOTHING: A LITTLE
PERSONAL GROOMING: A LITTLE
WALKING IN HOSPITAL ROOM: A LITTLE
TURNING FROM BACK TO SIDE WHILE IN FLAT BAD: A LITTLE
TOILETING: A LITTLE
DRESSING REGULAR LOWER BODY CLOTHING: A LITTLE

## 2025-03-26 ASSESSMENT — PAIN SCALES - GENERAL: PAINLEVEL_OUTOF10: 0 - NO PAIN

## 2025-03-26 NOTE — PROGRESS NOTES
Pharmacy Medication History Review    Elizabeth Toledo is a 49 y.o. female admitted for Cirrhosis (Multi). Pharmacy reviewed the patient's [id number] medications and allergies for accuracy.    Medications ADDED:  None  Medications CHANGED:  None  Medications REMOVED:   Vitamin D3   Ondansetron 4mg tab    The list below reflects the updated PTA list.   Prior to Admission Medications   Prescriptions Last Dose Informant   ARIPiprazole (Abilify) 15 mg tablet  Family Member   Sig: Take 1 tablet (15 mg) by mouth once daily.   FLUoxetine (PROzac) 20 mg capsule  Family Member   Sig: Take 3 capsules (60 mg) by mouth once daily.   divalproex (Depakote ER) 250 mg 24 hr tablet  Family Member   Sig: Take 2 tablets (500 mg) by mouth once daily at bedtime.   fenofibrate micronized (Lofibra) 134 mg capsule Not Taking Family Member   Sig: Take 1 capsule (134 mg) by mouth once daily with breakfast.   Patient not taking: Reported on 3/26/2025   furosemide (Lasix) 40 mg tablet  Family Member   Sig: Take 1.5 tablets (60 mg) by mouth once daily.   hepatitis B virus vacc.rec,PF, (ENGERIX-B) 20 mcg/mL syringe  Family Member   Sig: Give a dose now and in 2 months   levothyroxine (Synthroid, Levoxyl) 25 mcg tablet  Family Member   Sig: TAKE 1 TABLET BY MOUTH EVERY DAY   lovastatin (Mevacor) 20 mg tablet  Family Member   Sig: TAKE 1 TABLET BY MOUTH EVERY DAY   spironolactone (Aldactone) 50 mg tablet  Family Member   Sig: Take 1 tablet (50 mg) by mouth once daily.   zinc gluconate 50 mg tablet  Family Member   Sig: Take 1 tablet (50 mg of elemental zinc) by mouth 2 times a day.      Facility-Administered Medications: None        The list below reflects the updated allergy list. Please review each documented allergy for additional clarification and justification.  Allergies  Reviewed by Narendra Raymond on 3/26/2025   No Known Allergies         Patient declines M2B at discharge.     Sources:   -Patient interview with family member  "  -Outpatient pharmacy dispense history  -[facility]  -Care everywhere   -Chart review      Additional Comments:  Patient family member provided a medication list       JAMESON DAVALOSWHITE  Pharmacy Technician  03/26/25     Secure Chat preferred   If no response call [id number] or [employer] \"Med Rec\"    "

## 2025-03-26 NOTE — CARE PLAN
The patient's goals for the shift include Patient will remain HDS during the shift    The clinical goals for the shift include Pt will remain safe throughout the shift.      Problem: Skin  Goal: Decreased wound size/increased tissue granulation at next dressing change  Outcome: Progressing  Flowsheets (Taken 3/26/2025 0143)  Decreased wound size/increased tissue granulation at next dressing change:   Protective dressings over bony prominences   Promote sleep for wound healing  Goal: Participates in plan/prevention/treatment measures  Outcome: Progressing  Flowsheets (Taken 3/26/2025 0143)  Participates in plan/prevention/treatment measures:   Discuss with provider PT/OT consult   Elevate heels  Goal: Prevent/manage excess moisture  Outcome: Progressing  Flowsheets (Taken 3/26/2025 0143)  Prevent/manage excess moisture:   Cleanse incontinence/protect with barrier cream   Monitor for/manage infection if present  Goal: Prevent/minimize sheer/friction injuries  Outcome: Progressing  Flowsheets (Taken 3/26/2025 0143)  Prevent/minimize sheer/friction injuries:   Use pull sheet   HOB 30 degrees or less   Turn/reposition every 2 hours/use positioning/transfer devices  Goal: Promote/optimize nutrition  Outcome: Progressing  Flowsheets (Taken 3/26/2025 0143)  Promote/optimize nutrition:   Assist with feeding   Monitor/record intake including meals   Consume > 50% meals/supplements   Offer water/supplements/favorite foods  Goal: Promote skin healing  Outcome: Progressing  Flowsheets (Taken 3/26/2025 0143)  Promote skin healing:   Assess skin/pad under line(s)/device(s)   Turn/reposition every 2 hours/use positioning/transfer devices   Protective dressings over bony prominences   Rotate device position/do not position patient on device     Problem: Pain - Adult  Goal: Verbalizes/displays adequate comfort level or baseline comfort level  Outcome: Progressing     Problem: Safety - Adult  Goal: Free from fall injury  Outcome:  Progressing     Problem: Chronic Conditions and Co-morbidities  Goal: Patient's chronic conditions and co-morbidity symptoms are monitored and maintained or improved  Outcome: Progressing

## 2025-03-26 NOTE — PROGRESS NOTES
Pharmacy Admission Order Reconciliation Review    Elizabeth Toledo is a 49 y.o. female admitted for Cirrhosis (Multi). Pharmacy reviewed the patient's unreconciled admission medications.    Prior to admission medications that were reviewed and acted on by the pharmacist include:  abilify  depakote ER  prozac  engerix-B vaccine  zinc  synthroid  These medications have been reconciled.     Any other unreconcilied medications have been addressed and will be ordered or held by the patient's medical team. Medications addressed by the pharmacist may be added or changed by the patient's medical team at any time.    Leonides Zaldivar, PharmD   Transitions of Care Pharmacist    Please reach out via Secure Chat for questions

## 2025-03-26 NOTE — CONSULTS
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 Given this uncertainty, elemental zinc 50 mg daily was initiated. She presented to an  [facility] on 03/21 with diffuse abdominal pain. Labs revealed leukocytosis, hyponatremia, elevated LFTs, increasing INR, and elevated  creatinine (Cr 4.66) concerning for DANIELLA vs hepatorenal syndrome.   Imaging revealed cirrhosis with ascites and mild mesenteric edema. She was empirically started on IV ceftriaxone for suspected SBP versus cholecystitis and transferred to [facility] (03/25) for further management.    On transfer admission, her WBC remained elevated (21.5), with improving sodium (136) and creatinine (2.87), macrocytic anemia, and worsened coagulopathy (INR 2.0). Paracentesis was performed  03/26 with studies sent. Notably, a recent outpatient gallbladder ultrasound on 3/21 showed sludge and wall thickening concerning for cholecystitis, though a HIDA scan on 3/24 was normal.    12 point ROS otherwise negative, unless indicated above.     Past Medical History:    Past Medical History:   Diagnosis Date    Cirrhosis (Multi)     Disruptive mood dysregulation disorder (Multi)     Hyperlipidemia     Hypothyroidism     Rogelio syndrome  ([facility])        Home Medications  Medications Prior to Admission   Medication Sig Dispense Refill Last Dose/Taking    ARIPiprazole (Abilify) 15 mg tablet Take 1 tablet (15 mg) by mouth once daily.       cholecalciferol (Vitamin D-3) 50 mcg (2,000 unit) capsule Take 1 capsule (50 mcg) by mouth early in the morning.. (Patient not taking: Reported on 3/24/2025)       divalproex (Depakote ER) 250 mg 24 hr tablet Take 2 tablets (500 mg) by mouth once daily at bedtime.       fenofibrate micronized (Lofibra) 134 mg capsule Take 1 capsule (134 mg) by mouth once daily with breakfast. (Patient not taking: Reported on 3/24/2025) 90 capsule 3     FLUoxetine (PROzac) 20 mg capsule Take 3 capsules (60 mg) by mouth once daily.       furosemide (Lasix) 40 mg tablet Take 1.5 tablets (60 mg) by mouth once daily. 135 tablet 3     hepatitis B virus vacc.rec,PF, (ENGERIX-B) 20 mcg/mL syringe Give a dose now and in 2 months 1 mL 2     levothyroxine (Synthroid, Levoxyl) 25 mcg tablet TAKE 1 TABLET BY MOUTH EVERY DAY 90 tablet 3     lovastatin (Mevacor) 20 mg tablet TAKE 1 TABLET BY MOUTH EVERY DAY 90 tablet 1     ondansetron ODT (Zofran-ODT) 4 mg disintegrating tablet Dissolve in the mouth every 8 hours if needed for nausea or vomiting.       spironolactone (Aldactone) 50 mg tablet Take 1 tablet (50 mg) by mouth once daily. 30 tablet 6     zinc gluconate 50 mg tablet Take 1 tablet (50 mg of elemental zinc) by mouth 2 times a day. 60 tablet 11        Surgical History:    Past Surgical History:   Procedure Laterality Date    US GUIDED NEEDLE LIVER BIOPSY  8/2/2023    US GUIDED NEEDLE LIVER BIOPSY 8/2/2023 [facility]       Allergies:  No Known Allergies    Social History:    Social History     Socioeconomic History    Marital status: Single     Spouse name: Not on file    Number of children: Not on file    Years of education: Not on file    Highest education level: Not on file   Occupational History    Not on file   Tobacco Use    Smoking  status: Never     Passive exposure: Never    Smokeless tobacco: Never   Vaping Use    Vaping status: Never Used   Substance and Sexual Activity    Alcohol use: Never    Drug use: Never    Sexual activity: Not on file   Other Topics Concern    Not on file   Social History Narrative    Not on file     Social Drivers of Health     Financial Resource Strain: Low Risk  (3/25/2025)    Overall Financial Resource Strain (CARDIA)     Difficulty of Paying Living Expenses: Not hard at all   Food Insecurity: No Food Insecurity (3/25/2025)    Hunger Vital Sign     Worried About Running Out of Food in the Last Year: Never true     Ran Out of Food in the Last Year: Never true   Transportation Needs: No Transportation Needs (3/25/2025)    PRAPARE - Transportation     Lack of Transportation (Medical): No     Lack of Transportation (Non-Medical): No   Physical Activity: Inactive (3/25/2025)    Exercise Vital Sign     Days of Exercise per Week: 0 days     Minutes of Exercise per Session: 0 min   Stress: Not on File (5/20/2021)    Received from [facility]    Stress     Stress: 0   Social Connections: Not on File (9/17/2024)    Received from [facility]    Social Connections     Connectedness: 0   Intimate Partner Violence: Not At Risk (3/25/2025)    Humiliation, Afraid, Rape, and Kick questionnaire     Fear of Current or Ex-Partner: No     Emotionally Abused: No     Physically Abused: No     Sexually Abused: No   Housing Stability: Low Risk  (3/25/2025)    Housing Stability Vital Sign     Unable to Pay for Housing in the Last Year: No     Number of Times Moved in the Last Year: 0     Homeless in the Last Year: No       Family History:    Family History   Problem Relation Name Age of Onset    Diabetes Mother      Other (prediabetes) Father       No family history of GI or liver disease.     Objective     Vitals:    Vitals:    03/25/25 1651 03/26/25 0032 03/26/25 0456 03/26/25 0735   BP:  93/54  107/66   Pulse:  74  82   Resp:  18  16  "  Temp:  36.4 °C (97.5 °F) 36 °C (96.8 °F) 36.3 °C (97.3 °F)   TempSrc:       SpO2:  93%  98%   Weight: (!) 43.1 kg (95 lb 1.6 oz)      Height: 1.422 m (4' 7.98\")        Failed to redirect to the Timeline version of the [employer] SmartLink.    Intake/Output Summary (Last 24 hours) at 3/26/2025 1549  Last data filed at 3/26/2025 1227  Gross per 24 hour   Intake 2620 ml   Output 0 ml   Net 2620 ml       Physical Exam  Gen: well appearing, no acute distress  HEENT: PEERL, EOMI, sclera anicteric, MMM  Resp: CTAB, normal breath sounds, normal work of breathing   CV: RRR, no JVD  GI: non-tender, distended, no rebound or guarding,   MSK: warm and well perfused  Neuro: AOx3, no focal deficits, no asterixis  Skin: warm and dry, no lesions, no rashes     Labs:   Lab Results   Component Value Date    WBC 21.5 (H) 03/26/2025    HGB 10.8 (L) 03/26/2025    HCT 31.8 (L) 03/26/2025     (H) 03/26/2025     03/26/2025     Lab Results   Component Value Date    GLUCOSE 103 (H) 03/26/2025    CALCIUM 10.2 03/26/2025     03/26/2025    K 3.9 03/26/2025    CO2 20 (L) 03/26/2025    CL 98 03/26/2025     (HH) 03/26/2025    CREATININE 2.87 (H) 03/26/2025     Lab Results   Component Value Date    ALT 59 (H) 03/26/2025     (H) 03/26/2025    ALKPHOS 113 (H) 03/26/2025    BILITOT 1.6 (H) 03/26/2025     Lab Results   Component Value Date    INR 2.0 (H) 03/26/2025    INR 1.8 (H) 03/23/2025    INR 1.5 (H) 03/21/2025    PROTIME 22.4 (H) 03/26/2025    PROTIME 20.3 (H) 03/23/2025    PROTIME 16.5 (H) 03/21/2025       Imaging:   === 03/21/25 ===    US GALLBLADDER    - Impression -  Hepatomegaly with cirrhotic morphology liver. Associated moderate  volume ascites.    Gallbladder wall thickening with gallbladder sludge and a positive  sonographic Shannon sign reported by sonographer. Constellation of  findings warrants clinical exclusion of superimposed cholecystitis.  Consider gallbladder nuclear medicine scintigraphy for " further  assessment.    MACRO:  None      Signed by: Jerry Donald 3/21/2025 5:16 PM  Dictation workstation:   [id number]  === 02/25/15 ===    CT ABDOMEN PELVIS W CONTRAST    - Impression -  1. Interval increase in size of heterogeneous mass in the right  hepatic lobe, which now appears more heterogeneous and hypodense,  although some of this appearance may relate to differences in phase  of contrast, and a higher attenuating component raises concern for  possible hemorrhage within the mass, which could again represent a  hepatic adenoma, but malignant mass or abscess cannot be excluded and  further evaluation with liver MRI is again recommended.  Smaller  subcentimeter hypodensity in the right lobe as well as ill-defined  hypodensities in segment 4 are grossly unchanged.  These can also be  further evaluated with liver MRI.      Findings discussed with Dr. Vera at 1:38 AM on 2/26/2015.  === 12/19/24 ===    MR LIVER WO AND W CONTRAST    Addendum 12/19/2024 11:21 AM -----------------------------------------------  Interpreted By:  David Navarrete,  ADDENDUM:  Increased size of portacaval and hepatic lymph nodes favoring  reactive nodes.    Signed by: David Navarrete 12/19/2024 11:21 AM    -------- ORIGINAL REPORT --------  Dictation workstation:   [id number]    - Impression -  1.  Findings compatible with cirrhosis, portal hypertension and liver  decompensation with a large amount of ascites.  2. No MRI evidence of HCC.  3. Measurable reduction of pre-existing probably benign liver lesions.      MACRO:  None    Signed by: David Navarrete 12/19/2024 10:50 AM  Dictation workstation:   [id number]    GI procedures:    EGD 05/29/2025  mpression  The esophagus appeared normal.  Abnormal mucosa, consistent with gastritis in the antrum; performed cold forceps biopsy  The fundus of the stomach and body of the stomach appeared normal.  The duodenum appeared normal.        Findings  The esophagus appeared normal. Z-line  is 38 cm from the incisors. No varices were seen  Erythematous mucosa with erosion in the antrum, consistent with gastritis; performed cold forceps biopsy to rule out H. pylori;  The fundus of the stomach and body of the stomach appeared normal.  The duodenum appeared normal.      Assessment & Plan       This is a 49-year-old female with history of hepatic adenomas complicated by hemorhage (2015), Rogelio syndrome, hypothyroidism, and biopsy-confirmed cirrhosis (8/2/2023) of unclear etiology. 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    Her recent hospital presentation (3/21) was notable for diffuse abdominal pain, leukocytosis, hyponatremia, elevated LFTs, worsening coagulopathy (INR 2.0), and DANIELLA (peak Cr 4.66), concerning for possible hepatorenal syndrome vs. pre-renal etiology in dameon setting of infection. Imaging demonstrated cirrhosis with ascites and mild mesenteric edema. Although gallbladder ultrasound initially raised suspicion for acute cholecystitis (wall thickening and sludge), subsequent HIDA scan was normal (3/24). Given the clinical picture and leukocytosis, she was started empirically on IV ceftriaxone for suspected SBP vs cholecystitis prior to transfer to [facility] on 3/25.    Since transfer, renal function and sodium have improved (Cr down to 2.87, Na 136), though leukocytosis and coagulopathy persist. Diagnositc paracentesis was performed (3/26), with fluid studies pending.  Hepatology consulted to assist with management.      Plan:    #Cryptogenic Cirrhosis  #?Tin's Disease   #DANIELLA vs HRS   #Coagulopathy with elevated INR  - please give albumin, 25g TID for 48 hours   - strict I&Os  - continue Zinc gluconate, 50mg BID   - trend CBC, BMP, hepatic function panel, INR  - follow-up ascitic fluid studies and follow up ascitic cultures  - continue CTX, 2g daily   - Vitamin K, 10mg IV for 3 days        Patient seen and discussed with attending, Dr. Nikko Booker.     Ryley Sprague MD, PhD  Gastroenterology Fellow, PGY-6  [facility]   Division of Gastroenterology and Liver Disease      Thank you for the consultation. Gastroenterology will continue to the follow the patient.   Please do not hesitate to contact me on Haiku or page [phone] if there are any further questions between the weekday hours of 7 AM - 5 PM.   If there is an urgent concern during the weekend, after-hours, or holidays; then please page the on-call GI fellow at [phone]. Thank you.    SIGNATURE: Ryley Sprague MD PATIENT NAME: Elizabeth Toledo   DATE: March 26, 2025 MRN: [id number]

## 2025-03-26 NOTE — PROGRESS NOTES
Elizabeth Toledo is a 49 y.o. female on day 1 of admission presenting with Cirrhosis (Multi).      Subjective     No events over night,     Reports improvement in abdominal pain,     No c/o nausea.     No c/o fever or chills.     RN reported no new events.            Objective     Last Recorded Vitals  BP 98/63   Pulse 85   Temp 36.3 °C (97.3 °F)   Resp 16   Wt (!) 43.1 kg (95 lb 1.6 oz)   SpO2 97%   Intake/Output last 3 Shifts:    Intake/Output Summary (Last 24 hours) at 3/26/2025 1818  Last data filed at 3/26/2025 1749  Gross per 24 hour   Intake 1910 ml   Output --   Net 1910 ml       Admission Weight  Weight: (!) 43.1 kg (95 lb 1.6 oz) (03/25/25 1651)    Daily Weight  03/25/25 : (!) 43.1 kg (95 lb 1.6 oz)    Image Results  US guided abdominal paracentesis  Narrative: Interpreted By:  Ignacio Mitchell,   STUDY:  US GUIDED ABDOMINAL PARACENTESIS; 3/26/16556:28 pm      INDICATION:  Signs/Symptoms:Diagnostic and therapeutic paracentesis.      COMPARISON:  None.      ACCESSION NUMBER(S):  [id number]      ORDERING CLINICIAN:  CANDELARIA LUI      TECHNIQUE:  INTERVENTIONALIST(S):  Ignacio Mitchell      CONSENT:  The patient/patient's POA/next of kin was informed of the nature of  the proposed procedure. The purposes, alternatives, risks, and  benefits were explained and discussed. All questions were answered  and consent was obtained.      SEDATION:  None      MEDICATION/CONTRAST:          TIME OUT:  A time out was performed immediately prior to procedure start with  the interventional team, correctly identifying the patient name, date  of birth, MRN, procedure, anatomy (including marking of site and  side), patient position, procedure consent form, relevant laboratory  and imaging test results, antibiotic administration, safety  precautions, and procedure-specific equipment needs.      FINDINGS:  The patient was placed in the supine position.      The abdominal space was examined with grey scale ultrasound,  and the  most accessible fluid identified and marked for paracentesis.      The skin was prepped and draped in usual manner. Local anesthesia  with Lidocaine was administered and a right-sided paracentesis was  performed.  A 5 Polish One-Step paracentesis needle/catheter was then  placed where marked.  Approximately 2600 mL of yellowish colored  fluid was removed.  The needle/catheter was then withdrawn.      The patient tolerated the procedure well and there were no immediate  complications. Specimen(s) sent to the laboratory and pathology for  further evaluation, per the requesting team.      Impression: Uneventful paracentesis, as detailed above. Right Hemiabdomen, 2600 mL      I personally performed and/or directly supervised this study and was  present for the entire procedure.      Performed and dictated at [facility].      Signed by: Ignacio Mitchell 3/26/2025 4:29 PM  Dictation workstation:   [id number]    Vitals:    03/26/25 1627   BP: 98/63   Pulse: 85   Resp: 16   Temp: 36.3 °C (97.3 °F)   SpO2: 97%       Physical Exam  Constitutional:       Comments: Comfortable on RA at rest    HENT:      Head: Normocephalic.      Nose: Nose normal.      Mouth/Throat:      Mouth: Mucous membranes are dry.   Eyes:      Extraocular Movements: Extraocular movements intact.      Pupils: Pupils are equal, round, and reactive to light.   Cardiovascular:      Rate and Rhythm: Normal rate and regular rhythm.      Heart sounds: Normal heart sounds. No murmur heard.  Pulmonary:      Effort: No respiratory distress.      Breath sounds: Normal breath sounds. No wheezing.   Abdominal:      General: There is distension.      Palpations: Abdomen is soft.      Tenderness: There is no abdominal tenderness. There is no guarding.   Musculoskeletal:         General: Normal range of motion.      Cervical back: Normal range of motion.      Right lower leg: No edema.      Left lower leg: No edema.    Skin:     General: Skin is warm.   Neurological:      General: No focal deficit present.      Mental Status: She is alert and oriented to person, place, and time.   Psychiatric:         Mood and Affect: Mood normal.         Relevant Results  Scheduled medications  [START ON 3/28/2025] albumin human, 100 g, intravenous, TID  ARIPiprazole, 15 mg, oral, Daily  cefTRIAXone, 2 g, intravenous, q24h  divalproex, 500 mg, oral, Nightly  FLUoxetine, 60 mg, oral, Daily  heparin (porcine), 5,000 Units, subcutaneous, q8h DEMETRIUS  [START ON 3/27/2025] levothyroxine, 25 mcg, oral, Daily  phytonadione, 10 mg, intravenous, Daily  zinc sulfate, 50 mg of elemental zinc, oral, BID      Continuous medications     PRN medications      Results for orders placed or performed during the hospital encounter of 03/25/25 (from the past 24 hours)   CBC   Result Value Ref Range    WBC 21.5 (H) 4.4 - 11.3 x10*3/uL    nRBC 0.0 0.0 - 0.0 /100 WBCs    RBC 3.02 (L) 4.00 - 5.20 x10*6/uL    Hemoglobin 10.8 (L) 12.0 - 16.0 g/dL    Hematocrit 31.8 (L) 36.0 - 46.0 %     (H) 80 - 100 fL    MCH 35.8 (H) 26.0 - 34.0 pg    MCHC 34.0 32.0 - 36.0 g/dL    RDW 16.5 (H) 11.5 - 14.5 %    Platelets 224 150 - 450 x10*3/uL   Comprehensive metabolic panel   Result Value Ref Range    Glucose 103 (H) 74 - 99 mg/dL    Sodium 136 136 - 145 mmol/L    Potassium 3.9 3.5 - 5.3 mmol/L    Chloride 98 98 - 107 mmol/L    Bicarbonate 20 (L) 21 - 32 mmol/L    Anion Gap 22 (H) 10 - 20 mmol/L    Urea Nitrogen 100 (HH) 6 - 23 mg/dL    Creatinine 2.87 (H) 0.50 - 1.05 mg/dL    eGFR 19 (L) >60 mL/min/1.73m*2    Calcium 10.2 8.6 - 10.6 mg/dL    Albumin 3.8 3.4 - 5.0 g/dL    Alkaline Phosphatase 113 (H) 33 - 110 U/L    Total Protein 8.0 6.4 - 8.2 g/dL     (H) 9 - 39 U/L    Bilirubin, Total 1.6 (H) 0.0 - 1.2 mg/dL    ALT 59 (H) 7 - 45 U/L   Coagulation Screen   Result Value Ref Range    Protime 22.4 (H) 9.8 - 12.4 seconds    INR 2.0 (H) 0.9 - 1.1    aPTT 61 (H) 26 - 36 seconds    Albumin, Body Fluid   Result Value Ref Range    Albumin, Fluid 0.8 Not established g/dL   Glucose, Body Fluid   Result Value Ref Range    Glucose, Fluid 160 Not established mg/dL       Assessment/Plan      49-year-old F with a PMH of Rogelio syndrome, developmental delay, cognitive and behavioral abnormalities, 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 She was started on Iv Antibiotics for suspected SBP.  HIDA scan was negative for any evidence of cholecystitis. She is transferred to [facility] for management of decompensated cirrhosis, suspected SBP and DANIELLA.       #Abdominal pain   #Decompensated cirrhosis ( suspected MASH related)  -pt presneted with abdominal pain  -gallbladder ultrasound suspicious for cholecystitis, however the HIDA scan negative for cholecystitis   -pt was started on rocephin 3/21 for suspected SBP, WBC trended down   -c/w rocephin   S/p Paracentesis on 3/26  Follow ascitic fluid analysis  Hepatology consulted, recs appreciated,   Vitamin K 10 mg x 3 days  Zinc sufate 50 mg BID  Albumin supplements 100 gms TID x 48 hrs  Continue to monitor MELD labs,        #DANIELLA:  Admission creatinine is 2.91, ( base line 1.0)   DD includes dehydration vs Hepatorenal syndrome,   Urine sodium less than 10,   -Continue to Hold lasix and aldactone   -avoid nephrotixic medications and contrast  Judicious fluid bolus  Albumin supplements       #Anemia stable  -no signs of active bleeding  HB 10.8,   -Likely anemia of chronic disease       Hypothyroidism  continue levothyroxine     Disruptive mood disorder  Continue Depakote and Abilify         F: PRN  E:PRN  N:clear liquid  A:PIV  Code status: full code  DVT ppx: heparin subQ     Dispo: pending improvement in DANIELLA, work up for SBP.        Marshall Boston MD

## 2025-03-26 NOTE — CONSULTS
Subjective   Interval History: has complaints abd distension.     Objective   Vital signs in last 24 hours:  /66   Pulse 82   Temp 36.3 °C (97.3 °F)   Resp 16   Wt (!) 43.1 kg (95 lb 1.6 oz)   SpO2 98%     Intake/Output last 3 shifts:  I/O last 3 completed shifts:  In: 1400 (32.5 mL/kg) [P.O.:1340; I.V.:10 (0.2 mL/kg); IV Piggyback:50]  Out: 0 (0 mL/kg)   Weight: 43.1 kg   Intake/Output this shift:  I/O this shift:  In: 1220 [P.O.:720; IV Piggyback:500]  Out: -     Physical Exam  Neuro: oriented to person, place, time, and general circumstances  HEENT: normocephalic, atraumatic  Pulm: clear to auscultation bilaterally, no wheezes, good air entry  Cardiac: Regular rate and rhythm or without murmur or extra heart sounds  Abdomen: soft, nontender, normal bowel sounds, distended  Pulses: 2+ and symmetric    Relevant Results  LABS:  Lab Results   Component Value Date    WBC 21.5 (H) 03/26/2025    HGB 10.8 (L) 03/26/2025    HCT 31.8 (L) 03/26/2025     (H) 03/26/2025     03/26/2025      Results from last 72 hours   Lab Units 03/26/25  0600   SODIUM mmol/L 136   POTASSIUM mmol/L 3.9   CHLORIDE mmol/L 98   CO2 mmol/L 20*   BUN mg/dL 100*   CREATININE mg/dL 2.87*   GLUCOSE mg/dL 103*   CALCIUM mg/dL 10.2   ANION GAP mmol/L 22*   EGFR mL/min/1.73m*2 19*     Results from last 72 hours   Lab Units 03/26/25  0600   ALK PHOS U/L 113*   BILIRUBIN TOTAL mg/dL 1.6*   PROTEIN TOTAL g/dL 8.0   ALT U/L 59*   AST U/L 114*   ALBUMIN g/dL 3.8     Results from last 72 hours   Lab Units 03/26/25  0600   INR  2.0*       MICRO:  No results found for the last 14 days.      IMAGING:  US guided abdominal paracentesis    (Results Pending)       Assessment/Plan   Paracentesis. Please refer to imaging study for further detail.     LOS: 1 day     ROC Lynch      I personally spent over half of a total 35 minutes in counseling and discussion with the patient and coordination of care as described above.

## 2025-03-26 NOTE — CARE PLAN
Problem: Pain - Adult  Goal: Verbalizes/displays adequate comfort level or baseline comfort level  Outcome: Progressing     Problem: Safety - Adult  Goal: Free from fall injury  Outcome: Progressing     Problem: Discharge Planning  Goal: Discharge to home or other facility with appropriate resources  Outcome: Progressing     Problem: Chronic Conditions and Co-morbidities  Goal: Patient's chronic conditions and co-morbidity symptoms are monitored and maintained or improved  Outcome: Progressing     Problem: Nutrition  Goal: Nutrient intake appropriate for maintaining nutritional needs  Outcome: Progressing     Problem: Skin  Goal: Decreased wound size/increased tissue granulation at next dressing change  Outcome: Progressing  Flowsheets (Taken 3/26/2025 1555)  Decreased wound size/increased tissue granulation at next dressing change:   Protective dressings over bony prominences   Promote sleep for wound healing  Goal: Participates in plan/prevention/treatment measures  Outcome: Progressing  Flowsheets (Taken 3/26/2025 1555)  Participates in plan/prevention/treatment measures:   Elevate heels   Increase activity/out of bed for meals  Goal: Prevent/manage excess moisture  Outcome: Progressing  Flowsheets (Taken 3/26/2025 1555)  Prevent/manage excess moisture:   Cleanse incontinence/protect with barrier cream   Monitor for/manage infection if present   Moisturize dry skin  Goal: Prevent/minimize sheer/friction injuries  Outcome: Progressing  Flowsheets (Taken 3/26/2025 1555)  Prevent/minimize sheer/friction injuries:   HOB 30 degrees or less   Turn/reposition every 2 hours/use positioning/transfer devices   Increase activity/out of bed for meals  Goal: Promote/optimize nutrition  Outcome: Progressing  Flowsheets (Taken 3/26/2025 1555)  Promote/optimize nutrition:   Assist with feeding   Monitor/record intake including meals  Goal: Promote skin healing  Outcome: Progressing  Flowsheets (Taken 3/26/2025 1555)  Promote  skin healing:   Assess skin/pad under line(s)/device(s)   Protective dressings over bony prominences   Turn/reposition every 2 hours/use positioning/transfer devices     The clinical goals for the shift include Patient will remain safe and stable throughout the shift

## 2025-03-26 NOTE — POST-PROCEDURE NOTE
INTERVENTIONAL RADIOLOGY ADVANCED PRACTICE PROCEDURE  [facility]    A time out was performed and Right Hemiabdomen was examined with US and appropriate entry point was confirmed and marked.   The patient was prepped and draped in a sterile manner, 1% lidocaine was used to anesthesize the skin and subcutaneous tissue.   A 5F Centesis needle was then introduced through the skin into the peritoneal space, the centesis catheter was then threaded without difficulty.   2600 ml of yellow fluid was removed without difficulty. The catheter was then removed.   No immediate complications were noted during and immediately following the procedure.

## 2025-03-27 ENCOUNTER — APPOINTMENT (OUTPATIENT)
Dept: RADIOLOGY | Facility: HOSPITAL | Age: 50
DRG: 432 | End: 2025-03-27
Payer: MEDICARE

## 2025-03-27 LAB
ALBUMIN SERPL BCP-MCNC: 3.1 G/DL (ref 3.4–5)
ALP SERPL-CCNC: 99 U/L (ref 33–110)
ALT SERPL W P-5'-P-CCNC: 50 U/L (ref 7–45)
ANION GAP SERPL CALC-SCNC: 16 MMOL/L (ref 10–20)
AST SERPL W P-5'-P-CCNC: 94 U/L (ref 9–39)
BILIRUB SERPL-MCNC: 1.3 MG/DL (ref 0–1.2)
BUN SERPL-MCNC: 90 MG/DL (ref 6–23)
CALCIUM SERPL-MCNC: 9.5 MG/DL (ref 8.6–10.6)
CHLORIDE SERPL-SCNC: 99 MMOL/L (ref 98–107)
CO2 SERPL-SCNC: 25 MMOL/L (ref 21–32)
CREAT SERPL-MCNC: 2.28 MG/DL (ref 0.5–1.05)
EGFRCR SERPLBLD CKD-EPI 2021: 26 ML/MIN/1.73M*2
ERYTHROCYTE [DISTWIDTH] IN BLOOD BY AUTOMATED COUNT: 16.3 % (ref 11.5–14.5)
GLUCOSE SERPL-MCNC: 88 MG/DL (ref 74–99)
HCT VFR BLD AUTO: 26.5 % (ref 36–46)
HGB BLD-MCNC: 8.9 G/DL (ref 12–16)
INR PPP: 1.9 (ref 0.9–1.1)
MCH RBC QN AUTO: 34.9 PG (ref 26–34)
MCHC RBC AUTO-ENTMCNC: 33.6 G/DL (ref 32–36)
MCV RBC AUTO: 104 FL (ref 80–100)
NRBC BLD-RTO: 0 /100 WBCS (ref 0–0)
PLATELET # BLD AUTO: 179 X10*3/UL (ref 150–450)
POTASSIUM SERPL-SCNC: 3.7 MMOL/L (ref 3.5–5.3)
PROT SERPL-MCNC: 6.5 G/DL (ref 6.4–8.2)
PROTHROMBIN TIME: 21.1 SECONDS (ref 9.8–12.4)
RBC # BLD AUTO: 2.55 X10*6/UL (ref 4–5.2)
SODIUM SERPL-SCNC: 136 MMOL/L (ref 136–145)
WBC # BLD AUTO: 12.3 X10*3/UL (ref 4.4–11.3)

## 2025-03-27 PROCEDURE — 36415 COLL VENOUS BLD VENIPUNCTURE: CPT | Performed by: INTERNAL MEDICINE

## 2025-03-27 PROCEDURE — 80053 COMPREHEN METABOLIC PANEL: CPT | Performed by: INTERNAL MEDICINE

## 2025-03-27 PROCEDURE — 2500000004 HC RX 250 GENERAL PHARMACY W/ HCPCS (ALT 636 FOR OP/ED)

## 2025-03-27 PROCEDURE — 2500000001 HC RX 250 WO HCPCS SELF ADMINISTERED DRUGS (ALT 637 FOR MEDICARE OP)

## 2025-03-27 PROCEDURE — 2500000001 HC RX 250 WO HCPCS SELF ADMINISTERED DRUGS (ALT 637 FOR MEDICARE OP): Performed by: INTERNAL MEDICINE

## 2025-03-27 PROCEDURE — 2500000004 HC RX 250 GENERAL PHARMACY W/ HCPCS (ALT 636 FOR OP/ED): Performed by: INTERNAL MEDICINE

## 2025-03-27 PROCEDURE — 85610 PROTHROMBIN TIME: CPT | Performed by: INTERNAL MEDICINE

## 2025-03-27 PROCEDURE — 74019 RADEX ABDOMEN 2 VIEWS: CPT | Performed by: RADIOLOGY

## 2025-03-27 PROCEDURE — 74019 RADEX ABDOMEN 2 VIEWS: CPT

## 2025-03-27 PROCEDURE — 99232 SBSQ HOSP IP/OBS MODERATE 35: CPT | Performed by: INTERNAL MEDICINE

## 2025-03-27 PROCEDURE — 99233 SBSQ HOSP IP/OBS HIGH 50: CPT | Performed by: STUDENT IN AN ORGANIZED HEALTH CARE EDUCATION/TRAINING PROGRAM

## 2025-03-27 PROCEDURE — 1210000001 HC SEMI-PRIVATE ROOM DAILY

## 2025-03-27 PROCEDURE — P9047 ALBUMIN (HUMAN), 25%, 50ML: HCPCS | Performed by: INTERNAL MEDICINE

## 2025-03-27 PROCEDURE — 85027 COMPLETE CBC AUTOMATED: CPT | Performed by: INTERNAL MEDICINE

## 2025-03-27 RX ORDER — ONDANSETRON HYDROCHLORIDE 2 MG/ML
4 INJECTION, SOLUTION INTRAVENOUS EVERY 6 HOURS PRN
Status: DISCONTINUED | OUTPATIENT
Start: 2025-03-27 | End: 2025-04-01 | Stop reason: HOSPADM

## 2025-03-27 RX ORDER — ALBUMIN HUMAN 250 G/1000ML
25 SOLUTION INTRAVENOUS 3 TIMES DAILY
Status: COMPLETED | OUTPATIENT
Start: 2025-03-27 | End: 2025-03-29

## 2025-03-27 RX ORDER — BISACODYL 5 MG
10 TABLET, DELAYED RELEASE (ENTERIC COATED) ORAL ONCE
Status: COMPLETED | OUTPATIENT
Start: 2025-03-27 | End: 2025-03-27

## 2025-03-27 RX ORDER — ALBUMIN HUMAN 250 G/1000ML
100 SOLUTION INTRAVENOUS 3 TIMES DAILY
Status: DISCONTINUED | OUTPATIENT
Start: 2025-03-27 | End: 2025-03-27

## 2025-03-27 RX ORDER — POLYETHYLENE GLYCOL 3350 17 G/17G
17 POWDER, FOR SOLUTION ORAL DAILY
Status: DISCONTINUED | OUTPATIENT
Start: 2025-03-27 | End: 2025-04-01 | Stop reason: HOSPADM

## 2025-03-27 RX ADMIN — HEPARIN SODIUM 5000 UNITS: 5000 INJECTION, SOLUTION INTRAVENOUS; SUBCUTANEOUS at 06:58

## 2025-03-27 RX ADMIN — CEFTRIAXONE SODIUM 2 G: 2 INJECTION, SOLUTION INTRAVENOUS at 17:11

## 2025-03-27 RX ADMIN — ALBUMIN HUMAN 25 G: 0.25 SOLUTION INTRAVENOUS at 22:24

## 2025-03-27 RX ADMIN — ZINC SULFATE 220 MG (50 MG) CAPSULE 50 MG OF ELEMENTAL ZINC: CAPSULE at 21:50

## 2025-03-27 RX ADMIN — SALINE NASAL SPRAY 1 SPRAY: 1.5 SOLUTION NASAL at 14:40

## 2025-03-27 RX ADMIN — LEVOTHYROXINE SODIUM 25 MCG: 25 TABLET ORAL at 06:58

## 2025-03-27 RX ADMIN — BISACODYL 10 MG: 5 TABLET, COATED ORAL at 14:39

## 2025-03-27 RX ADMIN — DIVALPROEX SODIUM 500 MG: 500 TABLET, FILM COATED, EXTENDED RELEASE ORAL at 21:50

## 2025-03-27 RX ADMIN — ZINC SULFATE 220 MG (50 MG) CAPSULE 50 MG OF ELEMENTAL ZINC: CAPSULE at 08:16

## 2025-03-27 RX ADMIN — HEPARIN SODIUM 5000 UNITS: 5000 INJECTION, SOLUTION INTRAVENOUS; SUBCUTANEOUS at 14:21

## 2025-03-27 RX ADMIN — POLYETHYLENE GLYCOL 3350 17 G: 17 POWDER, FOR SOLUTION ORAL at 10:39

## 2025-03-27 RX ADMIN — ALBUMIN HUMAN 25 G: 0.25 SOLUTION INTRAVENOUS at 12:16

## 2025-03-27 RX ADMIN — FLUOXETINE HYDROCHLORIDE 60 MG: 20 CAPSULE ORAL at 06:58

## 2025-03-27 RX ADMIN — PHYTONADIONE 10 MG: 10 INJECTION, EMULSION INTRAMUSCULAR; INTRAVENOUS; SUBCUTANEOUS at 21:50

## 2025-03-27 RX ADMIN — ARIPIPRAZOLE 15 MG: 15 TABLET ORAL at 08:16

## 2025-03-27 RX ADMIN — HEPARIN SODIUM 5000 UNITS: 5000 INJECTION, SOLUTION INTRAVENOUS; SUBCUTANEOUS at 21:50

## 2025-03-27 ASSESSMENT — COGNITIVE AND FUNCTIONAL STATUS - GENERAL
MOVING TO AND FROM BED TO CHAIR: A LITTLE
DRESSING REGULAR UPPER BODY CLOTHING: A LITTLE
TOILETING: A LITTLE
PERSONAL GROOMING: A LITTLE
TURNING FROM BACK TO SIDE WHILE IN FLAT BAD: A LITTLE
DRESSING REGULAR LOWER BODY CLOTHING: A LITTLE
MOBILITY SCORE: 18
DAILY ACTIVITIY SCORE: 18
EATING MEALS: A LITTLE
CLIMB 3 TO 5 STEPS WITH RAILING: A LITTLE
HELP NEEDED FOR BATHING: A LITTLE
MOVING FROM LYING ON BACK TO SITTING ON SIDE OF FLAT BED WITH BEDRAILS: A LITTLE
WALKING IN HOSPITAL ROOM: A LITTLE
STANDING UP FROM CHAIR USING ARMS: A LITTLE

## 2025-03-27 ASSESSMENT — PAIN SCALES - GENERAL
PAINLEVEL_OUTOF10: 0 - NO PAIN

## 2025-03-27 ASSESSMENT — ACTIVITIES OF DAILY LIVING (ADL): LACK_OF_TRANSPORTATION: NO

## 2025-03-27 NOTE — CONSULTS
"Nutrition Initial Assessment:   Reason for Assessment: Admission nursing screening    Patient is a 49 y.o. female admitted for abdominal pain and reports of leukocytosis and outpatient lab work.. PMH of Rogelio syndrome, developmental delay, cognitive and behavioral abnormalities, MASH liver cirrhosis requiring frequent paracenteses, HLD, hypothyroidism.    Nutrition Assessment    Nursing Nutrition Screening:  Malnutrition Screening Tool (MST):  Malnutrition Screening Tool (MST)  Have you recently lost weight without trying?: Yes  If yes, how much weight have you lost?: Lost 14 - 23 pounds  Weight Loss Score: 2  Have you been eating poorly because of a decreased appetite?: Yes  Malnutrition Score: 3    Nutrition Screen:  Nutrition Screen  Stage 3 or 4 Pressure Injury or Multiple Non-Healing Wounds: No  Home Tube Feeding or Total Parenteral Nutrition (TPN): No  Dietitian Consult Needed: Yes (Comment)    Nutrition History:  Food and Nutrient History: Pt ate 50% x 1 meal so far today. 66-75% x 3 meals on 3/26.  Energy Intake: Fair 50-75 %       GI Symptoms:  constipation, abdomen is distended secondary to ascites  Oral Problems:  none noted    Anthropometrics:  Ht: 142.2 cm (4' 7.98\"), Wt: (!) 43.1 kg (95 lb 1.6 oz), BMI: 21.33  IBW/kg (Dietitian Calculated): 36.36 kg  Percent of IBW: 119 %       Weight History:  Daily Weight  03/25/25 : (!) 43.1 kg (95 lb 1.6 oz)  03/21/25 : (!) 40.8 kg (90 lb)  03/18/25 : (!) 40.8 kg (90 lb)  02/19/25 : 51.7 kg (114 lb)  02/05/25 : 52.6 kg (116 lb)  02/04/25 : 53 kg (116 lb 12.8 oz)  12/19/24 : 61.4 kg (135 lb 5.8 oz)  12/16/24 : 61.4 kg (135 lb 6.4 oz)  12/02/24 : 56.7 kg (125 lb)  09/18/24 : 56.7 kg (125 lb)     Weight History / % Weight Change: Noted an 18.3 kg LOSS (30% change) in the past 3 months (Possibly related to fluid status)  Significant Weight Loss: Yes  Interpretation of Weight Loss: >7.5% in 3 months       Nutrition Focused Physical Exam Findings:  defer: remote " assessment    Allergies:  No Known Allergies    Nutrition Significant Labs:  BMP Trend:   Results from last 7 days   Lab Units 03/27/25  0527 03/26/25  0600 03/25/25  0607 03/24/25  0345   GLUCOSE mg/dL 88 103* 108* 103*   CALCIUM mg/dL 9.5 10.2 9.2 9.3   SODIUM mmol/L 136 136 131* 133*   POTASSIUM mmol/L 3.7 3.9 3.8 4.0   CO2 mmol/L 25 20* 22 22   CHLORIDE mmol/L 99 98 99 99   BUN mg/dL 90* 100* 105* 112*   CREATININE mg/dL 2.28* 2.87* 2.91* 3.16*        Medications:  albumin human, 25 g, intravenous, TID  ARIPiprazole, 15 mg, oral, Daily  cefTRIAXone, 2 g, intravenous, q24h  divalproex, 500 mg, oral, Nightly  FLUoxetine, 60 mg, oral, Daily  heparin (porcine), 5,000 Units, subcutaneous, q8h DEMETRIUS  levothyroxine, 25 mcg, oral, Daily  phytonadione, 10 mg, intravenous, Daily  polyethylene glycol, 17 g, oral, Daily  zinc sulfate, 50 mg of elemental zinc, oral, BID           I/O:    ;      Dietary Orders (From admission, onward)       Start     Ordered    03/26/25 1059  Adult diet Regular, 2-3 grams sodium  Diet effective now        Question Answer Comment   Diet type Regular    Diet type 2-3 grams sodium        03/26/25 1059    03/25/25 1903  May Participate in Room Service With Assistance  ( ROOM SERVICE MAY PARTICIPATE WITH ASSISTANCE)  Once        Question:  .  Answer:  Yes    03/25/25 1902                     Estimated Needs:   Estimated Energy Needs  Total Energy Estimated Needs in 24 hours (kCal): 1272 kCal  Energy Estimated Needs per kg Body Weight in 24 hours (kCal/kg): 35 kCal/kg (Cirrhosis)  Method for Estimating Needs: IBW    Estimated Protein Needs  Total Protein Estimated Needs in 24 Hours (g): 54 g  Protein Estimated Needs per kg Body Weight in 24 Hours (g/kg): 1.5 g/kg (cirrhosis)  Method for Estimating 24 Hour Protein Needs: IBW    Estimated Fluid Needs  Total Fluid Estimated Needs in 24 Hours (mL): 1090 mL  Total Fluid Estimated Needs in 24 hours (mL/kg): 30 mL/kg  Method for Estimating 24 Hour Fluid  Needs: IBW  Patient on Order Fluid Restriction: No        Nutrition Diagnosis        Nutrition Diagnosis  Patient has Nutrition Diagnosis: Yes  Diagnosis Status (1): New  Nutrition Diagnosis 1: Increased nutrient needs (protein)  Related to (1): cirrhosis  As Evidenced by (1): pt with known h/o cirrhosis, abdominal ascites       Nutrition Interventions/Recommendations   Nutrition Prescription:  Individualized Nutrition Prescription Provided for : Recommend providing 7449-1268 kcal and 43-54 gm protein daily via PO diet    Nutrition Interventions:   Food and/or Nutrient Delivery Interventions  Interventions: Meals and snacks, Medical food supplement  Meals and Snacks: Mineral-modified diet  Goal: Continue current sodium controlled diet as ordered  Medical Food Supplement: Commercial beverage medical food supplement therapy  Goal: Initiate orders for Ensure HP daily (160 kcal, 16 gm protein total)    Education Documentation  No documentation found.           Nutrition Monitoring and Evaluation   Food/Nutrient Related History Monitoring  Monitoring and Evaluation Plan: Intake / amount of food  Intake / Amount of food: Consumes at least 50% or more of meals/snacks/supplements              Physical Exam Findings  Monitoring and Evaluation Plan: Digestive System  Digestive System Finding: Abdominal distension         Time Spent/Follow-up:   Follow Up  Time Spent (min): 30 minutes  Last Date of Nutrition Visit: 03/27/25  Nutrition Follow-Up Needed?: Dietitian to reassess per policy  Follow up Comment: Fair PO, Ensure HP daily    03/27/25 at 11:40 AM - MERCY GUTHRIE RDN, PIYUSH

## 2025-03-27 NOTE — PROGRESS NOTES
Elizabeth Toledo is a 49 y.o. female on day 2 of admission presenting with Cirrhosis (Multi).      Subjective     No events over night,     Reports improvement in abdominal pain,     No c/o nausea.     No c/o fever or chills.     RN reported no new events.            Objective     Last Recorded Vitals  BP 98/62   Pulse 81   Temp 36.3 °C (97.3 °F)   Resp 17   Wt (!) 43.1 kg (95 lb 1.6 oz)   SpO2 97%   Intake/Output last 3 Shifts:    Intake/Output Summary (Last 24 hours) at 3/27/2025 0845  Last data filed at 3/27/2025 0829  Gross per 24 hour   Intake 1590 ml   Output --   Net 1590 ml       Admission Weight  Weight: (!) 43.1 kg (95 lb 1.6 oz) (03/25/25 1651)    Daily Weight  03/25/25 : (!) 43.1 kg (95 lb 1.6 oz)    Image Results  US guided abdominal paracentesis  Narrative: Interpreted By:  Ignacio Mitchell,   STUDY:  US GUIDED ABDOMINAL PARACENTESIS; 3/26/89458:28 pm      INDICATION:  Signs/Symptoms:Diagnostic and therapeutic paracentesis.      COMPARISON:  None.      ACCESSION NUMBER(S):  [id number]      ORDERING CLINICIAN:  CANDELARIA LUI      TECHNIQUE:  INTERVENTIONALIST(S):  Ignacio Mitchell      CONSENT:  The patient/patient's POA/next of kin was informed of the nature of  the proposed procedure. The purposes, alternatives, risks, and  benefits were explained and discussed. All questions were answered  and consent was obtained.      SEDATION:  None      MEDICATION/CONTRAST:          TIME OUT:  A time out was performed immediately prior to procedure start with  the interventional team, correctly identifying the patient name, date  of birth, MRN, procedure, anatomy (including marking of site and  side), patient position, procedure consent form, relevant laboratory  and imaging test results, antibiotic administration, safety  precautions, and procedure-specific equipment needs.      FINDINGS:  The patient was placed in the supine position.      The abdominal space was examined with grey scale ultrasound,  and the  most accessible fluid identified and marked for paracentesis.      The skin was prepped and draped in usual manner. Local anesthesia  with Lidocaine was administered and a right-sided paracentesis was  performed.  A 5 Japanese One-Step paracentesis needle/catheter was then  placed where marked.  Approximately 2600 mL of yellowish colored  fluid was removed.  The needle/catheter was then withdrawn.      The patient tolerated the procedure well and there were no immediate  complications. Specimen(s) sent to the laboratory and pathology for  further evaluation, per the requesting team.      Impression: Uneventful paracentesis, as detailed above. Right Hemiabdomen, 2600 mL      I personally performed and/or directly supervised this study and was  present for the entire procedure.      Performed and dictated at [facility].      Signed by: Ignacio Mitchell 3/26/2025 4:29 PM  Dictation workstation:   [id number]    Vitals:    03/27/25 0715   BP: 98/62   Pulse: 81   Resp: 17   Temp: 36.3 °C (97.3 °F)   SpO2: 97%       Physical Exam  Constitutional:       Comments: Comfortable on RA at rest    HENT:      Head: Normocephalic.      Nose: Nose normal.      Mouth/Throat:      Mouth: Mucous membranes are dry.   Eyes:      Extraocular Movements: Extraocular movements intact.      Pupils: Pupils are equal, round, and reactive to light.   Cardiovascular:      Rate and Rhythm: Normal rate and regular rhythm.      Heart sounds: Normal heart sounds. No murmur heard.  Pulmonary:      Effort: No respiratory distress.      Breath sounds: Normal breath sounds. No wheezing.   Abdominal:      General: There is distension.      Palpations: Abdomen is soft.      Tenderness: There is no abdominal tenderness. There is no guarding.   Musculoskeletal:         General: Normal range of motion.      Cervical back: Normal range of motion.      Right lower leg: No edema.      Left lower leg: No edema.    Skin:     General: Skin is warm.   Neurological:      General: No focal deficit present.      Mental Status: She is alert and oriented to person, place, and time.   Psychiatric:         Mood and Affect: Mood normal.         Relevant Results  Scheduled medications  [START ON 3/28/2025] albumin human, 100 g, intravenous, TID  ARIPiprazole, 15 mg, oral, Daily  cefTRIAXone, 2 g, intravenous, q24h  divalproex, 500 mg, oral, Nightly  FLUoxetine, 60 mg, oral, Daily  heparin (porcine), 5,000 Units, subcutaneous, q8h DEMETRIUS  levothyroxine, 25 mcg, oral, Daily  phytonadione, 10 mg, intravenous, Daily  zinc sulfate, 50 mg of elemental zinc, oral, BID      Continuous medications     PRN medications      Results for orders placed or performed during the hospital encounter of 03/25/25 (from the past 24 hours)   Body Fluid Cell Count   Result Value Ref Range    Color, Fluid Yellow Colorless, Straw, Yellow    Clarity, Fluid Clear Clear    WBC, Fluid 39 See Comment /uL    RBC, Fluid 40 see comment /uL   Body Fluid Differential   Result Value Ref Range    Neutrophils %, Manual, Fluid 5 see comment %    Lymphocytes %, Manual, Fluid 35 see comment %    Mono/Macrophages %, Manual, Fluid 57 see comment %    Eosinophils %, Manual, Fluid 3 see comment %    Basophils %, Manual, Fluid 0 not established %    Immature Granulocytes %, Manual, Fluid 0 not established %    Blasts %, Manual, Fluid 0 not established %    Unclassified Cells %, Manual, Fluid 0 not established %    Plasma Cells %, Manual, Fluid 0 not established %    Total Cells Counted, Fluid 100    Albumin, Body Fluid   Result Value Ref Range    Albumin, Fluid 0.8 Not established g/dL   Glucose, Body Fluid   Result Value Ref Range    Glucose, Fluid 160 Not established mg/dL   CBC   Result Value Ref Range    WBC 12.3 (H) 4.4 - 11.3 x10*3/uL    nRBC 0.0 0.0 - 0.0 /100 WBCs    RBC 2.55 (L) 4.00 - 5.20 x10*6/uL    Hemoglobin 8.9 (L) 12.0 - 16.0 g/dL    Hematocrit 26.5 (L) 36.0 - 46.0 %      (H) 80 - 100 fL    MCH 34.9 (H) 26.0 - 34.0 pg    MCHC 33.6 32.0 - 36.0 g/dL    RDW 16.3 (H) 11.5 - 14.5 %    Platelets 179 150 - 450 x10*3/uL   Protime-INR   Result Value Ref Range    Protime 21.1 (H) 9.8 - 12.4 seconds    INR 1.9 (H) 0.9 - 1.1   Comprehensive Metabolic Panel   Result Value Ref Range    Glucose 88 74 - 99 mg/dL    Sodium 136 136 - 145 mmol/L    Potassium 3.7 3.5 - 5.3 mmol/L    Chloride 99 98 - 107 mmol/L    Bicarbonate 25 21 - 32 mmol/L    Anion Gap 16 10 - 20 mmol/L    Urea Nitrogen 90 (H) 6 - 23 mg/dL    Creatinine 2.28 (H) 0.50 - 1.05 mg/dL    eGFR 26 (L) >60 mL/min/1.73m*2    Calcium 9.5 8.6 - 10.6 mg/dL    Albumin 3.1 (L) 3.4 - 5.0 g/dL    Alkaline Phosphatase 99 33 - 110 U/L    Total Protein 6.5 6.4 - 8.2 g/dL    AST 94 (H) 9 - 39 U/L    Bilirubin, Total 1.3 (H) 0.0 - 1.2 mg/dL    ALT 50 (H) 7 - 45 U/L       Assessment/Plan      49-year-old F with a PMH of Rogelio syndrome, developmental delay, cognitive and behavioral abnormalities, MASH liver cirrhosis followed by hepatology requiring frequent paracenteses, HLD, hypothyroidism, who is presented to [facility] for abnormal labs and abdominal pain. She was started on Iv Antibiotics for suspected SBP.  HIDA scan was negative for any evidence of cholecystitis. She is transferred to [facility] for management of decompensated cirrhosis, suspected SBP and DANIELLA.       #Abdominal pain   #Decompensated cirrhosis ( suspected MASH related)  -pt presented to OSH with abdominal pain  -gallbladder ultrasound suspicious for cholecystitis, however the HIDA scan was negative for cholecystitis   -pt was started on rocephin 3/21 for suspected SBP, WBC trended down   -c/w rocephin   S/p Paracentesis on 3/26  Ascitic fluid analysis showed normal WBC, ( however she received several doses of antibiotics)  Hepatology consulted, recs appreciated,   Vitamin K 10 mg x 3 days D2/3.   Zinc sufate 50 mg BID  Albumin supplements 25 gms TID x 48  hrs  Continue to monitor MELD labs,        #DANIELLA:  Admission creatinine is 2.91, ( base line 1.0)   DD includes dehydration vs Hepatorenal syndrome,   Urine sodium less than 10,   -Continue to Hold lasix and aldactone   -avoid nephrotixic medications and contrast  Judicious fluid bolus  Continue Albumin supplements       #Anemia stable  -no signs of active bleeding  HB 10.8,   -Likely anemia of chronic disease       Hypothyroidism  continue levothyroxine     Disruptive mood disorder  Continue Depakote and Abilify         F: PRN  E:PRN  N:clear liquid  A:PIV  Code status: full code  DVT ppx: heparin subQ     Dispo: pending improvement in DANIELLA, and albumin replacement.         Marshall Boston MD

## 2025-03-27 NOTE — PROGRESS NOTES
03/27/25 1135   Discharge Planning   Living Arrangements Parent   Support Systems Parent   Assistance Needed patient is up and independent, needs some assistance with ADL's   Type of Residence Private residence   Home or Post Acute Services None   Expected Discharge Disposition Home   Does the patient need discharge transport arranged? No   Financial Resource Strain   How hard is it for you to pay for the very basics like food, housing, medical care, and heating? Not hard   Housing Stability   In the last 12 months, was there a time when you were not able to pay the mortgage or rent on time? N   In the past 12 months, how many times have you moved where you were living? 0   At any time in the past 12 months, were you homeless or living in a shelter (including now)? N   Transportation Needs   In the past 12 months, has lack of transportation kept you from medical appointments or from getting medications? no   In the past 12 months, has lack of transportation kept you from meetings, work, or from getting things needed for daily living? No       Plan per Medical/Surgical team: Patient is admitted for decompensated cirrhosis and DANIELLA. Patient was scheduled for a paracentesis on 3/26. Hepatology consulted.    Assessment performed with patient father at the bedside.    Demographics/Insurance: verified.  Living Environment: Patient lives with her parents.    Home Care: denies  PCP: Dr. Gretchen Hwang  Pharmacy: [facility] in [place]  DME: requesting a rollator  Falls: denies  Dialysis: denies  Social Work Needs: no financial or social work needs stated  Transportation at discharge: parents will provide transportation    Potential Barriers: none  Discharge Disposition: home  ADOD:  1-3 days    Debra Tsang RN, BSN  Transitional Care Coordinator

## 2025-03-27 NOTE — CARE PLAN
The patient's goals for the shift include Patient will remain HDS during the shift    The clinical goals for the shift include pt will remain free from injuries during this shift      Problem: Pain - Adult  Goal: Verbalizes/displays adequate comfort level or baseline comfort level  Outcome: Progressing     Problem: Safety - Adult  Goal: Free from fall injury  Outcome: Progressing     Problem: Discharge Planning  Goal: Discharge to home or other facility with appropriate resources  Outcome: Progressing     Problem: Chronic Conditions and Co-morbidities  Goal: Patient's chronic conditions and co-morbidity symptoms are monitored and maintained or improved  Outcome: Progressing     Problem: Nutrition  Goal: Nutrient intake appropriate for maintaining nutritional needs  Outcome: Progressing     Problem: Skin  Goal: Decreased wound size/increased tissue granulation at next dressing change  Outcome: Progressing  Flowsheets (Taken 3/27/2025 0536)  Decreased wound size/increased tissue granulation at next dressing change:   Protective dressings over bony prominences   Promote sleep for wound healing  Goal: Participates in plan/prevention/treatment measures  Outcome: Progressing  Flowsheets (Taken 3/27/2025 0536)  Participates in plan/prevention/treatment measures: Elevate heels  Goal: Prevent/manage excess moisture  Outcome: Progressing  Flowsheets (Taken 3/27/2025 0536)  Prevent/manage excess moisture:   Monitor for/manage infection if present   Moisturize dry skin  Goal: Prevent/minimize sheer/friction injuries  Outcome: Progressing  Flowsheets (Taken 3/27/2025 0536)  Prevent/minimize sheer/friction injuries:   Increase activity/out of bed for meals   HOB 30 degrees or less   Turn/reposition every 2 hours/use positioning/transfer devices  Goal: Promote/optimize nutrition  Outcome: Progressing  Flowsheets (Taken 3/27/2025 0536)  Promote/optimize nutrition:   Monitor/record intake including meals   Offer  water/supplements/favorite foods  Goal: Promote skin healing  Outcome: Progressing  Flowsheets (Taken 3/27/2025 7343)  Promote skin healing:   Turn/reposition every 2 hours/use positioning/transfer devices   Protective dressings over bony prominences   Assess skin/pad under line(s)/device(s)

## 2025-03-27 NOTE — PROGRESS NOTES
Gastroenterology Consult Service Progress Note  Department of Gastroenterology & Hepatology  [facility]    [facility]  March 27, 2025   Patient: Elizabeth Toledo    Medical Record: [id number]  Reason for Initial Consult: Concerns for SBP, elevated LFTs, and elevated Cr concerning for HRS    Interval History:   - NAEO   - mild improvement in LFTs, INR elevated at 1.9,   - improving renal function   - improving leukocytosis     Physical Exam:    Vitals:    03/26/25 0735 03/26/25 1627 03/27/25 0043 03/27/25 0715   BP: 107/66 98/63 98/62 98/62   Pulse: 82 85 77 81   Resp: 16 16 16 17   Temp: 36.3 °C (97.3 °F) 36.3 °C (97.3 °F) 36.4 °C (97.5 °F) 36.3 °C (97.3 °F)   TempSrc:   Temporal    SpO2: 98% 97% 95% 97%   Weight:       Height:             Intake/Output Summary (Last 24 hours) at 3/27/2025 1404  Last data filed at 3/27/2025 1340  Gross per 24 hour   Intake 950 ml   Output --   Net 950 ml       Gen: well appearing, no acute distress  HEENT: PEERL, EOMI, sclera anicteric, MMM  Resp: CTAB, normal breath sounds, normal work of breathing   CV: RRR, no JVD  GI: non-tender, distended, no rebound or guarding,   MSK: warm and well perfused  Neuro: AOx3, no focal deficits, no asterixis  Skin: warm and dry, no lesions, no rashe    Labs:  CBC  Results from last 7 days   Lab Units 03/27/25  0526 03/26/25  0600 03/25/25  0607 03/24/25  0345   HEMOGLOBIN g/dL 8.9* 10.8* 9.1* 9.4*   HEMATOCRIT % 26.5* 31.8* 26.9* 27.4*   WBC AUTO x10*3/uL 12.3* 21.5* 16.3* 16.0*   PLATELETS AUTO x10*3/uL 179 224 174 162      BMP  Results from last 7 days   Lab Units 03/27/25  0527 03/26/25  0600 03/25/25  0607 03/24/25  0345   SODIUM mmol/L 136 136 131* 133*   POTASSIUM mmol/L 3.7 3.9 3.8 4.0   CHLORIDE mmol/L 99 98 99 99   CO2 mmol/L 25 20* 22 22   BUN mg/dL 90* 100* 105* 112*   CREATININE mg/dL 2.28* 2.87* 2.91* 3.16*   ANION GAP mmol/L 16 22* 14 16   GLUCOSE mg/dL 88 103* 108* 103*   CALCIUM mg/dL  9.5 10.2 9.2 9.3     LFTS  Results from last 7 days   Lab Units 03/27/25  0527 03/26/25  0600 03/25/25  0607 03/23/25  0500 03/22/25  0726 03/21/25  1419 03/20/25  1525   [facility] ALT U/L  --   --   --   --   --   --  80*   ALT U/L 50* 59* 52*   < > 54*   < >  --    [facility] AST U/L  --   --   --   --   --   --  146*   AST U/L 94* 114* 103*   < > 97*   < >  --    [facility] ALK PHOS U/L  --   --   --   --   --   --  136   ALK PHOS U/L 99 113* 77   < > 90   < >  --    BILIRUBIN TOTAL mg/dL 1.3* 1.6* 1.6*   < > 2.3*   < >  --    [facility] BILIRUBIN TOTAL mg/dL  --   --   --   --   --   --  3.0*   [facility] BILIRUBIN DIRECT mg/dL  --   --   --   --   --   --  TNP   BILIRUBIN DIRECT mg/dL  --   --   --   --  1.2*  --   --     < > = values in this interval not displayed.     COAGS  Results from last 7 days   Lab Units 03/27/25  0526 03/26/25  0600 03/23/25  0500   INR  1.9* 2.0* 1.8*        Imaging:  === 03/25/25 ===    US GUIDED ABDOMINAL PARACENTESIS    - Impression -  Uneventful paracentesis, as detailed above. Right Hemiabdomen, 2600 mL    I personally performed and/or directly supervised this study and was  present for the entire procedure.    Performed and dictated at [facility].    Signed by: Ignacio Mitchell 3/26/2025 4:29 PM  Dictation workstation:   [id number]  === 02/25/15 ===    CT ABDOMEN PELVIS W CONTRAST    - Impression -  1. Interval increase in size of heterogeneous mass in the right  hepatic lobe, which now appears more heterogeneous and hypodense,  although some of this appearance may relate to differences in phase  of contrast, and a higher attenuating component raises concern for  possible hemorrhage within the mass, which could again represent a  hepatic adenoma, but malignant mass or abscess cannot be excluded and  further evaluation with liver MRI is again recommended.  Smaller  subcentimeter hypodensity in the right lobe as well as ill-defined  hypodensities in segment 4 are  grossly unchanged.  These can also be  further evaluated with liver MRI.      Findings discussed with Dr. Vera at 1:38 AM on 2/26/2015.  === 12/19/24 ===    MR LIVER WO AND W CONTRAST    Addendum 12/19/2024 11:21 AM -----------------------------------------------  Interpreted By:  David Navarrete,  ADDENDUM:  Increased size of portacaval and hepatic lymph nodes favoring  reactive nodes.    Signed by: David Navarrete 12/19/2024 11:21 AM    -------- ORIGINAL REPORT --------  Dictation workstation:   [id number]    - Impression -  1.  Findings compatible with cirrhosis, portal hypertension and liver  decompensation with a large amount of ascites.  2. No MRI evidence of HCC.  3. Measurable reduction of pre-existing probably benign liver lesions.      MACRO:  None    Signed by: David Navarrete 12/19/2024 10:50 AM  Dictation workstation:   [id number]    GI procedures    EGD 05/29/2025  mpression  The esophagus appeared normal.  Abnormal mucosa, consistent with gastritis in the antrum; performed cold forceps biopsy  The fundus of the stomach and body of the stomach appeared normal.  The duodenum appeared normal.  Findings  The esophagus appeared normal. Z-line is 38 cm from the incisors. No varices were seen  Erythematous mucosa with erosion in the antrum, consistent with gastritis; performed cold forceps biopsy to rule out H. pylori;  The fundus of the stomach and body of the stomach appeared normal.  The duodenum appeared normal.    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    Her recent hospital presentation (3/21) was notable for diffuse abdominal pain, leukocytosis, hyponatremia, elevated  LFTs, worsening coagulopathy (INR 2.0), and DANIELLA (peak Cr 4.66), concerning for possible hepatorenal syndrome vs. pre-renal etiology in dameon setting of infection. Imaging demonstrated cirrhosis with ascites and mild mesenteric edema. Although gallbladder ultrasound initially raised suspicion for acute cholecystitis (wall thickening and sludge), subsequent HIDA scan was normal (3/24). Given the clinical picture and leukocytosis, she was started empirically on IV ceftriaxone for suspected SBP vs cholecystitis prior [place] [facility] [place]     Since transfer, renal function and sodium have improved (Cr down to 2.87, Na 136), though leukocytosis and coagulopathy persist. Diagnositc paracentesis was performed (3/26), with fluid studies pending.  Hepatology consulted to assist with management.       Plan:     #Cryptogenic Cirrhosis  #?Tin's Disease   #DANIELLA vs HRS   #Coagulopathy with elevated INR  - please give albumin, 25g TID for 48 hours   - strict I&Os  - continue Zinc gluconate, 50mg BID   - trend CBC, BMP, hepatic function panel, INR  - follow-up ascitic fluid studies and follow up ascitic cultures  - continue CTX, 2g daily for 7 days (last dose 03/27)  - continue Vitamin K, 10mg IV for 3 days  - Miralax BID and senna daily until first bowel movement, then Miralax BID thereafter.    - can follow up with Dr. Vann at discharge       Patient seen and discussed with attending, Dr. Nikko Contreras.       Ryley Sprague MD, PhD  Gastroenterology Fellow, PGY-6  [facility] [place]   Division of Gastroenterology and Liver Disease       Thank you for the consultation. Gastroenterology will continue to the follow the patient.   Please do not hesitate to contact me on DocHalo or page [phone] if there are any further questions between the weekday hours of 7 AM - 5 PM.   If there is an urgent concern during the weekend, after-hours, or holidays; then please page the [place] [phone]. Thank  you.    SIGNATURE: Ryley Sprague MD PATIENT NAME: Elizabeth Toledo   DATE: March 27, 2025 MRN: [id number]

## 2025-03-28 LAB
ALBUMIN SERPL BCP-MCNC: 4.5 G/DL (ref 3.4–5)
ALP SERPL-CCNC: 90 U/L (ref 33–110)
ALT SERPL W P-5'-P-CCNC: 41 U/L (ref 7–45)
ANION GAP SERPL CALC-SCNC: 20 MMOL/L (ref 10–20)
AST SERPL W P-5'-P-CCNC: 72 U/L (ref 9–39)
BILIRUB SERPL-MCNC: 1.3 MG/DL (ref 0–1.2)
BUN SERPL-MCNC: 97 MG/DL (ref 6–23)
CALCIUM SERPL-MCNC: 9.9 MG/DL (ref 8.6–10.6)
CHLORIDE SERPL-SCNC: 97 MMOL/L (ref 98–107)
CO2 SERPL-SCNC: 24 MMOL/L (ref 21–32)
CREAT SERPL-MCNC: 2.54 MG/DL (ref 0.5–1.05)
EGFRCR SERPLBLD CKD-EPI 2021: 22 ML/MIN/1.73M*2
ERYTHROCYTE [DISTWIDTH] IN BLOOD BY AUTOMATED COUNT: 16.4 % (ref 11.5–14.5)
GLUCOSE BLD MANUAL STRIP-MCNC: 108 MG/DL (ref 74–99)
GLUCOSE BLD MANUAL STRIP-MCNC: 174 MG/DL (ref 74–99)
GLUCOSE SERPL-MCNC: 76 MG/DL (ref 74–99)
HCT VFR BLD AUTO: 26.9 % (ref 36–46)
HGB BLD-MCNC: 9.1 G/DL (ref 12–16)
INR PPP: 1.6 (ref 0.9–1.1)
MCH RBC QN AUTO: 34.9 PG (ref 26–34)
MCHC RBC AUTO-ENTMCNC: 33.8 G/DL (ref 32–36)
MCV RBC AUTO: 103 FL (ref 80–100)
NRBC BLD-RTO: 0 /100 WBCS (ref 0–0)
PLATELET # BLD AUTO: 158 X10*3/UL (ref 150–450)
POTASSIUM SERPL-SCNC: 4 MMOL/L (ref 3.5–5.3)
PROT SERPL-MCNC: 7.6 G/DL (ref 6.4–8.2)
PROTHROMBIN TIME: 18.2 SECONDS (ref 9.8–12.4)
RBC # BLD AUTO: 2.61 X10*6/UL (ref 4–5.2)
SODIUM SERPL-SCNC: 137 MMOL/L (ref 136–145)
WBC # BLD AUTO: 15.5 X10*3/UL (ref 4.4–11.3)

## 2025-03-28 PROCEDURE — 85027 COMPLETE CBC AUTOMATED: CPT | Performed by: INTERNAL MEDICINE

## 2025-03-28 PROCEDURE — 99233 SBSQ HOSP IP/OBS HIGH 50: CPT | Performed by: STUDENT IN AN ORGANIZED HEALTH CARE EDUCATION/TRAINING PROGRAM

## 2025-03-28 PROCEDURE — 2500000004 HC RX 250 GENERAL PHARMACY W/ HCPCS (ALT 636 FOR OP/ED)

## 2025-03-28 PROCEDURE — 82947 ASSAY GLUCOSE BLOOD QUANT: CPT

## 2025-03-28 PROCEDURE — 2500000001 HC RX 250 WO HCPCS SELF ADMINISTERED DRUGS (ALT 637 FOR MEDICARE OP)

## 2025-03-28 PROCEDURE — 2500000004 HC RX 250 GENERAL PHARMACY W/ HCPCS (ALT 636 FOR OP/ED): Performed by: INTERNAL MEDICINE

## 2025-03-28 PROCEDURE — P9047 ALBUMIN (HUMAN), 25%, 50ML: HCPCS | Performed by: INTERNAL MEDICINE

## 2025-03-28 PROCEDURE — 84075 ASSAY ALKALINE PHOSPHATASE: CPT | Performed by: INTERNAL MEDICINE

## 2025-03-28 PROCEDURE — 1210000001 HC SEMI-PRIVATE ROOM DAILY

## 2025-03-28 PROCEDURE — 85610 PROTHROMBIN TIME: CPT | Performed by: INTERNAL MEDICINE

## 2025-03-28 PROCEDURE — 2500000001 HC RX 250 WO HCPCS SELF ADMINISTERED DRUGS (ALT 637 FOR MEDICARE OP): Performed by: INTERNAL MEDICINE

## 2025-03-28 PROCEDURE — 36415 COLL VENOUS BLD VENIPUNCTURE: CPT | Performed by: INTERNAL MEDICINE

## 2025-03-28 PROCEDURE — 99232 SBSQ HOSP IP/OBS MODERATE 35: CPT | Performed by: INTERNAL MEDICINE

## 2025-03-28 RX ADMIN — ZINC SULFATE 220 MG (50 MG) CAPSULE 50 MG OF ELEMENTAL ZINC: CAPSULE at 08:49

## 2025-03-28 RX ADMIN — ALBUMIN HUMAN 25 G: 0.25 SOLUTION INTRAVENOUS at 05:19

## 2025-03-28 RX ADMIN — DIVALPROEX SODIUM 500 MG: 500 TABLET, FILM COATED, EXTENDED RELEASE ORAL at 21:23

## 2025-03-28 RX ADMIN — ALBUMIN HUMAN 25 G: 0.25 SOLUTION INTRAVENOUS at 11:52

## 2025-03-28 RX ADMIN — HEPARIN SODIUM 5000 UNITS: 5000 INJECTION, SOLUTION INTRAVENOUS; SUBCUTANEOUS at 16:10

## 2025-03-28 RX ADMIN — PHYTONADIONE 10 MG: 10 INJECTION, EMULSION INTRAMUSCULAR; INTRAVENOUS; SUBCUTANEOUS at 22:10

## 2025-03-28 RX ADMIN — ALBUMIN HUMAN 25 G: 0.25 SOLUTION INTRAVENOUS at 21:27

## 2025-03-28 RX ADMIN — SODIUM CHLORIDE, SODIUM LACTATE, POTASSIUM CHLORIDE, AND CALCIUM CHLORIDE 500 ML: .6; .31; .03; .02 INJECTION, SOLUTION INTRAVENOUS at 09:20

## 2025-03-28 RX ADMIN — FLUOXETINE HYDROCHLORIDE 60 MG: 20 CAPSULE ORAL at 05:54

## 2025-03-28 RX ADMIN — ARIPIPRAZOLE 15 MG: 15 TABLET ORAL at 08:49

## 2025-03-28 RX ADMIN — POLYETHYLENE GLYCOL 3350 17 G: 17 POWDER, FOR SOLUTION ORAL at 08:49

## 2025-03-28 RX ADMIN — LEVOTHYROXINE SODIUM 25 MCG: 25 TABLET ORAL at 05:54

## 2025-03-28 RX ADMIN — ZINC SULFATE 220 MG (50 MG) CAPSULE 50 MG OF ELEMENTAL ZINC: CAPSULE at 21:23

## 2025-03-28 RX ADMIN — HEPARIN SODIUM 5000 UNITS: 5000 INJECTION, SOLUTION INTRAVENOUS; SUBCUTANEOUS at 05:55

## 2025-03-28 RX ADMIN — HEPARIN SODIUM 5000 UNITS: 5000 INJECTION, SOLUTION INTRAVENOUS; SUBCUTANEOUS at 21:23

## 2025-03-28 ASSESSMENT — COGNITIVE AND FUNCTIONAL STATUS - GENERAL
CLIMB 3 TO 5 STEPS WITH RAILING: A LITTLE
DRESSING REGULAR UPPER BODY CLOTHING: A LITTLE
MOBILITY SCORE: 23
HELP NEEDED FOR BATHING: A LITTLE
EATING MEALS: A LITTLE
TOILETING: A LITTLE
MOBILITY SCORE: 23
DRESSING REGULAR LOWER BODY CLOTHING: A LITTLE
PERSONAL GROOMING: A LITTLE
CLIMB 3 TO 5 STEPS WITH RAILING: A LITTLE
DAILY ACTIVITIY SCORE: 18

## 2025-03-28 ASSESSMENT — PAIN SCALES - GENERAL
PAINLEVEL_OUTOF10: 0 - NO PAIN
PAINLEVEL_OUTOF10: 0 - NO PAIN

## 2025-03-28 NOTE — PROGRESS NOTES
Gastroenterology Consult Service Progress Note  Department of Gastroenterology & Hepatology  [facility]    [facility]  March 28, 2025   Patient: Elizabeth Toledo    Medical Record: [id number]  Reason for Initial Consult: Concerns for SBP, elevated LFTs, and elevated Cr concerning for HRS     Interval History:   - NAEO   - creatinine increased: 4.24 (3/22) ? 3.63 (3/23) ? 3.16 (3/24) ? 2.91 (3/25) ? 2.87 (3/26) ? 2.28 (3/27) ? 2.54 (3/28)  - continued leukocytosis: 15.3 (3/22) ? 14.6 (3/23) ? 16.0 (3/24) ? 16.3 (3/25) ? 21.5 (3/26) ? 12.3 (3/27) ? 15.5 (3/28), now s/p 7 day ceftriaxone course      Physical Exam:     Vitals          Vitals:     03/28/25 0400 03/28/25 0458 03/28/25 0500 03/28/25 0833   BP: 86/53 101/59 96/58 102/62   BP Location:     Left arm Left arm   Patient Position:       Sitting   Pulse: 78 81   79   Resp: 17     18   Temp: 36.3 °C (97.3 °F) 36.4 °C (97.5 °F)   36.2 °C (97.2 °F)   TempSrc:   Temporal   Temporal   SpO2: 97% 98%   98%   Weight:           Height:                       Intake/Output Summary (Last 24 hours) at 3/28/2025 1231  Last data filed at 3/27/2025 1751      Gross per 24 hour   Intake 490 ml   Output --   Net 490 ml         Gen: well appearing, no acute distress  HEENT: PEERL, EOMI, sclera anicteric, MMM  Resp: CTAB, normal breath sounds, normal work of breathing   CV: RRR, no JVD  GI: non-tender, distended, no rebound or guarding,   MSK: warm and well perfused  Neuro: AOx3, no focal deficits, no asterixis  Skin: warm and dry, no lesions, no rashe     Labs:  CBC          Results from last 7 days   Lab Units 03/28/25  0625 03/27/25  0526 03/26/25  0600 03/25/25  0607   HEMOGLOBIN g/dL 9.1* 8.9* 10.8* 9.1*   HEMATOCRIT % 26.9* 26.5* 31.8* 26.9*   WBC AUTO x10*3/uL 15.5* 12.3* 21.5* 16.3*   PLATELETS AUTO x10*3/uL 158 179 224 174      BMP          Results from last 7 days   Lab Units 03/28/25  0625 03/27/25  0527 03/26/25  0600  03/25/25  0607   SODIUM mmol/L 137 136 136 131*   POTASSIUM mmol/L 4.0 3.7 3.9 3.8   CHLORIDE mmol/L 97* 99 98 99   CO2 mmol/L 24 25 20* 22   BUN mg/dL 97* 90* 100* 105*   CREATININE mg/dL 2.54* 2.28* 2.87* 2.91*   ANION GAP mmol/L 20 16 22* 14   GLUCOSE mg/dL 76 88 103* 108*   CALCIUM mg/dL 9.9 9.5 10.2 9.2      LFTS           Results from last 7 days   Lab Units 03/28/25  0625 03/27/25  0527 03/26/25  0600 03/23/25  0500 03/22/25  0726   ALT U/L 41 50* 59*   < > 54*   AST U/L 72* 94* 114*   < > 97*   ALK PHOS U/L 90 99 113*   < > 90   BILIRUBIN TOTAL mg/dL 1.3* 1.3* 1.6*   < > 2.3*   BILIRUBIN DIRECT mg/dL  --   --   --   --  1.2*    < > = values in this interval not displayed.      COAGS         Results from last 7 days   Lab Units 03/28/25  0625 03/27/25  0526 03/26/25  0600   INR   1.6* 1.9* 2.0*         Imaging:  === 03/25/25 ===     US GUIDED ABDOMINAL PARACENTESIS     - Impression -  Uneventful paracentesis, as detailed above. Right Hemiabdomen, 2600 mL     I personally performed and/or directly supervised this study and was  present for the entire procedure.     Performed and dictated at [facility].     Signed by: Ignacio Mitchell 3/26/2025 4:29 PM  Dictation workstation:   [id number]  === 02/25/15 ===     CT ABDOMEN PELVIS W CONTRAST     - Impression -  1. Interval increase in size of heterogeneous mass in the right  hepatic lobe, which now appears more heterogeneous and hypodense,  although some of this appearance may relate to differences in phase  of contrast, and a higher attenuating component raises concern for  possible hemorrhage within the mass, which could again represent a  hepatic adenoma, but malignant mass or abscess cannot be excluded and  further evaluation with liver MRI is again recommended.  Smaller  subcentimeter hypodensity in the right lobe as well as ill-defined  hypodensities in segment 4 are grossly unchanged.  These can also be  further  evaluated with liver MRI.        Findings discussed with Dr. Vera at 1:38 AM on 2/26/2015.  === 12/19/24 ===     MR LIVER WO AND W CONTRAST     Addendum 12/19/2024 11:21 AM -----------------------------------------------  Interpreted By:  David Navarrete,  ADDENDUM:  Increased size of portacaval and hepatic lymph nodes favoring  reactive nodes.     Signed by: David Navarrete 12/19/2024 11:21 AM     -------- ORIGINAL REPORT --------  Dictation workstation:   [id number]     - Impression -  1.  Findings compatible with cirrhosis, portal hypertension and liver  decompensation with a large amount of ascites.  2. No MRI evidence of HCC.  3. Measurable reduction of pre-existing probably benign liver lesions.        MACRO:  None     Signed by: David Navarrete 12/19/2024 10:50 AM  Dictation workstation:   [id number]     GI procedures    EGD 05/29/2025  mpression  The esophagus appeared normal.  Abnormal mucosa, consistent with gastritis in the antrum; performed cold forceps biopsy  The fundus of the stomach and body of the stomach appeared normal.  The duodenum appeared normal.  Findings  The esophagus appeared normal. Z-line is 38 cm from the incisors. No varices were seen  Erythematous mucosa with erosion in the antrum, consistent with gastritis; performed cold forceps biopsy to rule out H. pylori;  The fundus of the stomach and body of the stomach appeared normal.  The duodenum appeared normal.     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    Her recent hospital presentation (3/21) was notable for diffuse abdominal pain, leukocytosis, hyponatremia, elevated LFTs, worsening coagulopathy (INR  2.0), and DANIELLA (peak Cr 4.66), concerning for possible hepatorenal syndrome vs. pre-renal etiology in dameon setting of infection. Imaging demonstrated cirrhosis with ascites and mild mesenteric edema. Although gallbladder ultrasound initially raised suspicion for acute cholecystitis (wall thickening and sludge), subsequent HIDA scan was normal (3/24). Given the clinical picture and leukocytosis, she was started empirically on IV ceftriaxone for suspected SBP vs cholecystitis prior to transfer to [facility] [place] on 3/25.     Since transfer, renal function and sodium have improved (Cr down to 2.87, Na 136), though leukocytosis and coagulopathy persist. Diagnositc paracentesis was performed (3/26), revealing a SAAG of 3.0, negative for SBP, with cultures having no growth to date.       Plan:  #Cryptogenic Cirrhosis  #?Tin's Disease   #DANIELLA vs HRS   #Coagulopathy with elevated INR  - please continue albumin, 25g TID for 48 hours   - strict I&Os  - continue Zinc gluconate, 50mg BID   - trend CBC, BMP, hepatic function panel, INR  - continue CTX, 2g daily for 7 days (last dose 03/27)  - continue Vitamin K, 10mg IV for 3 days  - Miralax BID and senna daily until first bowel movement, then Miralax BID thereafter.    - can follow up with Dr. Vann at discharge        Patient seen and discussed with attending, Dr. Reggie Sprague MD, PhD  Gastroenterology Fellow, PGY-6  [facility]   Division of Gastroenterology and Liver Disease       Thank you for the consultation. Gastroenterology will continue to the follow the patient.   Please do not hesitate to contact me on DocHalo or page [phone] if there are any further questions between the weekday hours of 7 AM - 5 PM.   If there is an urgent concern during the weekend, after-hours, or holidays; then please page the [place] [phone]. Thank you.

## 2025-03-28 NOTE — CARE PLAN
The patient's goals for the shift include improvement in food intake    The clinical goals for the shift include patient will remain HDS thoughout the shift    Over the shift, the patient did not make progress toward the following goals. Barriers to progression include  slow eater and early feeling of being full

## 2025-03-28 NOTE — PROGRESS NOTES
Elizabeth Toledo is a 49 y.o. female on day 3 of admission presenting with Cirrhosis (Multi).      Subjective     No events over night,     Reports no abdominal pain,     No c/o nausea.     No c/o fever or chills.     RN reported no new events.            Objective     Last Recorded Vitals  /62 (BP Location: Left arm, Patient Position: Sitting)   Pulse 79   Temp 36.2 °C (97.2 °F) (Temporal)   Resp 18   Wt (!) 43.1 kg (95 lb 1.6 oz)   SpO2 98%   Intake/Output last 3 Shifts:    Intake/Output Summary (Last 24 hours) at 3/28/2025 1432  Last data filed at 3/27/2025 1751  Gross per 24 hour   Intake 290 ml   Output --   Net 290 ml       Admission Weight  Weight: (!) 43.1 kg (95 lb 1.6 oz) (03/25/25 1651)    Daily Weight  03/25/25 : (!) 43.1 kg (95 lb 1.6 oz)    Image Results  XR abdomen 2 views supine and erect or decub  Narrative: Interpreted By:  Supa Henley,  Kenny Paul   STUDY:  XR ABDOMEN 2 VIEWS SUPINE AND ERECT OR DECUB;  3/27/2025 8:11 pm      INDICATION:  Signs/Symptoms:Emesis consider Ileus or partial obs.      COMPARISON:  CT abdomen and pelvis 02/26/2015. MR liver 02/19/2024.      ACCESSION NUMBER(S):  [id number]      ORDERING CLINICIAN:  MELONY MARQUIS      FINDINGS:  Two AP views of the abdomen were provided for review.      Nonobstructive bowel gas pattern. There is large colonic stool  burden. Limited evaluation of pneumoperitoneum, however no gross  evidence of free air is noted.      Visualized lungs are clear.      Osseous structures demonstrate no acute bony changes.      Impression: 1.  Large colonic stool burden with nonobstructive bowel-gas pattern.  No gross evidence of pneumoperitoneum.      I personally reviewed the images/study and I agree with the findings  as stated by resident Humberto Raman. This study was interpreted  at [place], [place].      MACRO:  None      Signed by: Supa Henley 3/28/2025 8:54 AM  Dictation  workstation:   [id number]    Vitals:    03/28/25 0833   BP: 102/62   Pulse: 79   Resp: 18   Temp: 36.2 °C (97.2 °F)   SpO2: 98%       Physical Exam  Constitutional:       Comments: Comfortable on RA at rest    HENT:      Head: Normocephalic.      Nose: Nose normal.      Mouth/Throat:      Mouth: Mucous membranes are dry.   Eyes:      Extraocular Movements: Extraocular movements intact.      Pupils: Pupils are equal, round, and reactive to light.   Cardiovascular:      Rate and Rhythm: Normal rate and regular rhythm.      Heart sounds: Normal heart sounds. No murmur heard.  Pulmonary:      Effort: No respiratory distress.      Breath sounds: Normal breath sounds. No wheezing.   Abdominal:      General: There is distension.      Palpations: Abdomen is soft.      Tenderness: There is no abdominal tenderness. There is no guarding.   Musculoskeletal:         General: Normal range of motion.      Cervical back: Normal range of motion.      Right lower leg: No edema.      Left lower leg: No edema.   Skin:     General: Skin is warm.   Neurological:      General: No focal deficit present.      Mental Status: She is alert and oriented to person, place, and time.   Psychiatric:         Mood and Affect: Mood normal.         Relevant Results  Scheduled medications  albumin human, 25 g, intravenous, TID  ARIPiprazole, 15 mg, oral, Daily  divalproex, 500 mg, oral, Nightly  FLUoxetine, 60 mg, oral, Daily  heparin (porcine), 5,000 Units, subcutaneous, q8h DEMETRIUS  levothyroxine, 25 mcg, oral, Daily  phytonadione, 10 mg, intravenous, Daily  polyethylene glycol, 17 g, oral, Daily  zinc sulfate, 50 mg of elemental zinc, oral, BID      Continuous medications     PRN medications  PRN medications: ondansetron, sodium chloride    Results for orders placed or performed during the hospital encounter of 03/25/25 (from the past 24 hours)   CBC   Result Value Ref Range    WBC 15.5 (H) 4.4 - 11.3 x10*3/uL    nRBC 0.0 0.0 - 0.0 /100 WBCs    RBC  2.61 (L) 4.00 - 5.20 x10*6/uL    Hemoglobin 9.1 (L) 12.0 - 16.0 g/dL    Hematocrit 26.9 (L) 36.0 - 46.0 %     (H) 80 - 100 fL    MCH 34.9 (H) 26.0 - 34.0 pg    MCHC 33.8 32.0 - 36.0 g/dL    RDW 16.4 (H) 11.5 - 14.5 %    Platelets 158 150 - 450 x10*3/uL   Comprehensive Metabolic Panel   Result Value Ref Range    Glucose 76 74 - 99 mg/dL    Sodium 137 136 - 145 mmol/L    Potassium 4.0 3.5 - 5.3 mmol/L    Chloride 97 (L) 98 - 107 mmol/L    Bicarbonate 24 21 - 32 mmol/L    Anion Gap 20 10 - 20 mmol/L    Urea Nitrogen 97 (HH) 6 - 23 mg/dL    Creatinine 2.54 (H) 0.50 - 1.05 mg/dL    eGFR 22 (L) >60 mL/min/1.73m*2    Calcium 9.9 8.6 - 10.6 mg/dL    Albumin 4.5 3.4 - 5.0 g/dL    Alkaline Phosphatase 90 33 - 110 U/L    Total Protein 7.6 6.4 - 8.2 g/dL    AST 72 (H) 9 - 39 U/L    Bilirubin, Total 1.3 (H) 0.0 - 1.2 mg/dL    ALT 41 7 - 45 U/L   Protime-INR   Result Value Ref Range    Protime 18.2 (H) 9.8 - 12.4 seconds    INR 1.6 (H) 0.9 - 1.1   POCT GLUCOSE   Result Value Ref Range    POCT Glucose 108 (H) 74 - 99 mg/dL   POCT GLUCOSE   Result Value Ref Range    POCT Glucose 174 (H) 74 - 99 mg/dL       Assessment/Plan      49-year-old F with a PMH of Rogelio syndrome, developmental delay, cognitive and behavioral abnormalities, 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 for abnormal labs and abdominal pain. She was started on Iv Antibiotics for suspected SBP.  HIDA scan was negative for any evidence of cholecystitis. She is transferred to [facility] for management of decompensated cirrhosis, suspected SBP and DANIELLA.       #Abdominal pain   #Decompensated cirrhosis ( suspected MASH related)  -pt presented to OSH with abdominal pain  -gallbladder ultrasound suspicious for cholecystitis, however the HIDA scan was negative for cholecystitis   -pt was started on rocephin 3/21 for suspected SBP, WBC trended down   -c/w rocephin   S/p  Paracentesis on 3/26  Ascitic fluid analysis showed normal WBC, ( however she received several doses of antibiotics)  Hepatology consulted, recs appreciated,   Vitamin K 10 mg x 3 days D3/3.   Zinc sufate 50 mg BID  Albumin supplements 25 gms TID x 48 hrs  MELD-Na 21 on 3/28,   Continue to monitor MELD labs,        #DANIELLA:  Creatinine is 2.5,   Admission creatinine is 2.91, ( base line 1.0)   DD includes dehydration vs Hepatorenal syndrome,   Urine sodium less than 10,   -Continue to Hold lasix and aldactone   -avoid nephrotixic medications and contrast  Judicious fluid bolus  Continue Albumin supplements       #Anemia stable  -no signs of active bleeding  HB 10.8,   -Likely anemia of chronic disease       Hypothyroidism  continue levothyroxine     Disruptive mood disorder  Continue Depakote and Abilify         F: PRN  E:PRN  N:clear liquid  A:PIV  Code status: full code  DVT ppx: heparin subQ     Dispo: pending improvement in DANIELLA, and albumin replacement. If no improvement in DANIELLA, start Terlipressin infusion on 3/29.         Marshall Boston MD

## 2025-03-28 NOTE — CARE PLAN
The patient's goals for the shift include Patient will remain HDS during the shift    The clinical goals for the shift include patient will remain HDS thoughout the shift      Problem: Pain - Adult  Goal: Verbalizes/displays adequate comfort level or baseline comfort level  Outcome: Progressing     Problem: Safety - Adult  Goal: Free from fall injury  Outcome: Progressing     Problem: Discharge Planning  Goal: Discharge to home or other facility with appropriate resources  Outcome: Progressing     Problem: Chronic Conditions and Co-morbidities  Goal: Patient's chronic conditions and co-morbidity symptoms are monitored and maintained or improved  Outcome: Progressing     Problem: Nutrition  Goal: Nutrient intake appropriate for maintaining nutritional needs  Outcome: Progressing     Problem: Skin  Goal: Decreased wound size/increased tissue granulation at next dressing change  Outcome: Progressing  Goal: Participates in plan/prevention/treatment measures  Outcome: Progressing  Goal: Prevent/manage excess moisture  Outcome: Progressing  Goal: Prevent/minimize sheer/friction injuries  Outcome: Progressing  Goal: Promote/optimize nutrition  Outcome: Progressing  Goal: Promote skin healing  Outcome: Progressing

## 2025-03-28 NOTE — CARE PLAN
The patient's goals for the shift include Patient will remain HDS during the shift    The clinical goals for the shift include patient will remain HDS thoughout the shift    Problem: Pain - Adult  Goal: Verbalizes/displays adequate comfort level or baseline comfort level  3/28/2025 0202 by Nay Rodriguez RN  Outcome: Progressing  3/28/2025 0125 by Nay Rodriguez RN  Outcome: Progressing     Problem: Safety - Adult  Goal: Free from fall injury  3/28/2025 0202 by Nay Rodriguez RN  Outcome: Progressing  3/28/2025 0125 by Nay Rodriguez RN  Outcome: Progressing     Problem: Discharge Planning  Goal: Discharge to home or other facility with appropriate resources  3/28/2025 0202 by Nay Rodriguez RN  Outcome: Progressing  3/28/2025 0125 by Nay Rodriguez RN  Outcome: Progressing     Problem: Chronic Conditions and Co-morbidities  Goal: Patient's chronic conditions and co-morbidity symptoms are monitored and maintained or improved  3/28/2025 0202 by Nay Rodriguez RN  Outcome: Progressing  3/28/2025 0125 by Nay Rodriguez RN  Outcome: Progressing     Problem: Nutrition  Goal: Nutrient intake appropriate for maintaining nutritional needs  3/28/2025 0202 by Nay Rodriguez RN  Outcome: Progressing  3/28/2025 0125 by Nay Rodriguez RN  Outcome: Progressing     Problem: Skin  Goal: Decreased wound size/increased tissue granulation at next dressing change  3/28/2025 0202 by Nay Rodriguez RN  Outcome: Progressing  Flowsheets (Taken 3/28/2025 0202)  Decreased wound size/increased tissue granulation at next dressing change: Protective dressings over bony prominences  3/28/2025 0125 by aNy Rodriguez RN  Outcome: Progressing  Goal: Participates in plan/prevention/treatment measures  3/28/2025 0202 by Nay Rodriguez RN  Outcome: Progressing  Flowsheets (Taken  3/28/2025 0202)  Participates in plan/prevention/treatment measures: Elevate heels  3/28/2025 0125 by Nay Rodriguez RN  Outcome: Progressing  Goal: Prevent/manage excess moisture  3/28/2025 0202 by Nay Rodriguez RN  Outcome: Progressing  Flowsheets (Taken 3/28/2025 0202)  Prevent/manage excess moisture: Monitor for/manage infection if present  3/28/2025 0125 by Nay Rodriguez RN  Outcome: Progressing  Goal: Prevent/minimize sheer/friction injuries  3/28/2025 0202 by Nay Rodriguez RN  Outcome: Progressing  Flowsheets (Taken 3/28/2025 0202)  Prevent/minimize sheer/friction injuries:   Use pull sheet   Increase activity/out of bed for meals   Turn/reposition every 2 hours/use positioning/transfer devices  3/28/2025 0125 by Nay Rodriguez RN  Outcome: Progressing  Goal: Promote/optimize nutrition  3/28/2025 0202 by Nay Rodrgiuez RN  Outcome: Progressing  Flowsheets (Taken 3/28/2025 0202)  Promote/optimize nutrition:   Monitor/record intake including meals   Offer water/supplements/favorite foods  3/28/2025 0125 by Nay Rodriguez RN  Outcome: Progressing  Goal: Promote skin healing  3/28/2025 0202 by Nay Rodriguez RN  Outcome: Progressing  Flowsheets (Taken 3/28/2025 0202)  Promote skin healing:   Turn/reposition every 2 hours/use positioning/transfer devices   Assess skin/pad under line(s)/device(s)   Protective dressings over bony prominences  3/28/2025 0125 by Nay Rodriguez RN  Outcome: Progressing

## 2025-03-28 NOTE — PROGRESS NOTES
3/28/25 1251 Transitional Care Coordinator Notes:     Transitional Care Coordination Progress Note:  Patient discussed during interdisciplinary rounds.   Team members present: MD and TCC  Plan per Medical/Surgical team: Patient creatinine is trending upward. Anticipating discharge possibly over the weekend. Will continue to follow for updates.  Payor: Medicare  Discharge disposition: home  Potential Barriers: none  ADOD: 1-3 days                  Assessment/Plan   Assessment & Plan  Cirrhosis (Multi)               Debra Tsang RN

## 2025-03-29 LAB
ALBUMIN SERPL BCP-MCNC: 4.4 G/DL (ref 3.4–5)
ALP SERPL-CCNC: 90 U/L (ref 33–110)
ALT SERPL W P-5'-P-CCNC: 31 U/L (ref 7–45)
ANION GAP SERPL CALC-SCNC: 19 MMOL/L (ref 10–20)
APPEARANCE UR: CLEAR
AST SERPL W P-5'-P-CCNC: 69 U/L (ref 9–39)
BACTERIA FLD CULT: NORMAL
BILIRUB SERPL-MCNC: 1.1 MG/DL (ref 0–1.2)
BILIRUB UR STRIP.AUTO-MCNC: NEGATIVE MG/DL
BUN SERPL-MCNC: 101 MG/DL (ref 6–23)
CALCIUM SERPL-MCNC: 9.2 MG/DL (ref 8.6–10.6)
CHLORIDE SERPL-SCNC: 97 MMOL/L (ref 98–107)
CHLORIDE UR-SCNC: <15 MMOL/L
CHLORIDE/CREATININE (MMOL/G) IN URINE: NORMAL
CO2 SERPL-SCNC: 22 MMOL/L (ref 21–32)
COLOR UR: YELLOW
CREAT SERPL-MCNC: 2.86 MG/DL (ref 0.5–1.05)
CREAT UR-MCNC: 179.7 MG/DL (ref 20–320)
EGFRCR SERPLBLD CKD-EPI 2021: 19 ML/MIN/1.73M*2
ERYTHROCYTE [DISTWIDTH] IN BLOOD BY AUTOMATED COUNT: 15.7 % (ref 11.5–14.5)
GLUCOSE SERPL-MCNC: 85 MG/DL (ref 74–99)
GLUCOSE UR STRIP.AUTO-MCNC: NORMAL MG/DL
GRAM STN SPEC: NORMAL
GRAM STN SPEC: NORMAL
HCT VFR BLD AUTO: 22.4 % (ref 36–46)
HGB BLD-MCNC: 8.2 G/DL (ref 12–16)
HYALINE CASTS #/AREA URNS AUTO: ABNORMAL /LPF
INR PPP: 1.4 (ref 0.9–1.1)
KETONES UR STRIP.AUTO-MCNC: ABNORMAL MG/DL
LEUKOCYTE ESTERASE UR QL STRIP.AUTO: ABNORMAL
MCH RBC QN AUTO: 35.8 PG (ref 26–34)
MCHC RBC AUTO-ENTMCNC: 36.6 G/DL (ref 32–36)
MCV RBC AUTO: 98 FL (ref 80–100)
MUCOUS THREADS #/AREA URNS AUTO: ABNORMAL /LPF
NITRITE UR QL STRIP.AUTO: NEGATIVE
NRBC BLD-RTO: 0 /100 WBCS (ref 0–0)
OSMOLALITY UR: 352 MOSM/KG (ref 200–1200)
PH UR STRIP.AUTO: 5.5 [PH]
PLATELET # BLD AUTO: 142 X10*3/UL (ref 150–450)
POTASSIUM SERPL-SCNC: 3.7 MMOL/L (ref 3.5–5.3)
POTASSIUM UR-SCNC: 47 MMOL/L
POTASSIUM/CREAT UR-RTO: 26 MMOL/G CREAT
PROT SERPL-MCNC: 7.2 G/DL (ref 6.4–8.2)
PROT UR STRIP.AUTO-MCNC: ABNORMAL MG/DL
PROTHROMBIN TIME: 15.6 SECONDS (ref 9.8–12.4)
RBC # BLD AUTO: 2.29 X10*6/UL (ref 4–5.2)
RBC # UR STRIP.AUTO: NEGATIVE MG/DL
RBC #/AREA URNS AUTO: ABNORMAL /HPF
SODIUM SERPL-SCNC: 134 MMOL/L (ref 136–145)
SODIUM UR-SCNC: <10 MMOL/L
SODIUM/CREAT UR-RTO: NORMAL
SP GR UR STRIP.AUTO: 1.02
SQUAMOUS #/AREA URNS AUTO: ABNORMAL /HPF
UROBILINOGEN UR STRIP.AUTO-MCNC: NORMAL MG/DL
WBC # BLD AUTO: 13 X10*3/UL (ref 4.4–11.3)
WBC #/AREA URNS AUTO: ABNORMAL /HPF

## 2025-03-29 PROCEDURE — 2500000001 HC RX 250 WO HCPCS SELF ADMINISTERED DRUGS (ALT 637 FOR MEDICARE OP)

## 2025-03-29 PROCEDURE — 87086 URINE CULTURE/COLONY COUNT: CPT | Performed by: INTERNAL MEDICINE

## 2025-03-29 PROCEDURE — 81001 URINALYSIS AUTO W/SCOPE: CPT | Performed by: INTERNAL MEDICINE

## 2025-03-29 PROCEDURE — 36415 COLL VENOUS BLD VENIPUNCTURE: CPT | Performed by: INTERNAL MEDICINE

## 2025-03-29 PROCEDURE — 1200000002 HC GENERAL ROOM WITH TELEMETRY DAILY

## 2025-03-29 PROCEDURE — 83935 ASSAY OF URINE OSMOLALITY: CPT | Performed by: INTERNAL MEDICINE

## 2025-03-29 PROCEDURE — 85610 PROTHROMBIN TIME: CPT | Performed by: INTERNAL MEDICINE

## 2025-03-29 PROCEDURE — 2500000001 HC RX 250 WO HCPCS SELF ADMINISTERED DRUGS (ALT 637 FOR MEDICARE OP): Performed by: INTERNAL MEDICINE

## 2025-03-29 PROCEDURE — 2500000004 HC RX 250 GENERAL PHARMACY W/ HCPCS (ALT 636 FOR OP/ED): Performed by: INTERNAL MEDICINE

## 2025-03-29 PROCEDURE — 82436 ASSAY OF URINE CHLORIDE: CPT | Performed by: INTERNAL MEDICINE

## 2025-03-29 PROCEDURE — 2500000004 HC RX 250 GENERAL PHARMACY W/ HCPCS (ALT 636 FOR OP/ED)

## 2025-03-29 PROCEDURE — 84075 ASSAY ALKALINE PHOSPHATASE: CPT | Performed by: INTERNAL MEDICINE

## 2025-03-29 PROCEDURE — 85027 COMPLETE CBC AUTOMATED: CPT | Performed by: INTERNAL MEDICINE

## 2025-03-29 PROCEDURE — P9047 ALBUMIN (HUMAN), 25%, 50ML: HCPCS | Performed by: INTERNAL MEDICINE

## 2025-03-29 PROCEDURE — 99232 SBSQ HOSP IP/OBS MODERATE 35: CPT | Performed by: INTERNAL MEDICINE

## 2025-03-29 RX ORDER — ALBUMIN HUMAN 250 G/1000ML
25 SOLUTION INTRAVENOUS ONCE
Status: COMPLETED | OUTPATIENT
Start: 2025-03-31 | End: 2025-03-31

## 2025-03-29 RX ORDER — AMOXICILLIN 250 MG
2 CAPSULE ORAL NIGHTLY
Status: DISCONTINUED | OUTPATIENT
Start: 2025-03-29 | End: 2025-04-01 | Stop reason: HOSPADM

## 2025-03-29 RX ORDER — ALBUMIN HUMAN 250 G/1000ML
25 SOLUTION INTRAVENOUS ONCE
Status: COMPLETED | OUTPATIENT
Start: 2025-03-30 | End: 2025-03-30

## 2025-03-29 RX ADMIN — HEPARIN SODIUM 5000 UNITS: 5000 INJECTION, SOLUTION INTRAVENOUS; SUBCUTANEOUS at 21:27

## 2025-03-29 RX ADMIN — ARIPIPRAZOLE 15 MG: 15 TABLET ORAL at 08:12

## 2025-03-29 RX ADMIN — POLYETHYLENE GLYCOL 3350 17 G: 17 POWDER, FOR SOLUTION ORAL at 08:12

## 2025-03-29 RX ADMIN — FLUOXETINE HYDROCHLORIDE 60 MG: 20 CAPSULE ORAL at 08:12

## 2025-03-29 RX ADMIN — TERLIPRESSIN 0.85 MG: 0.85 INJECTION, POWDER, LYOPHILIZED, FOR SOLUTION INTRAVENOUS at 21:21

## 2025-03-29 RX ADMIN — LEVOTHYROXINE SODIUM 25 MCG: 25 TABLET ORAL at 05:14

## 2025-03-29 RX ADMIN — ZINC SULFATE 220 MG (50 MG) CAPSULE 50 MG OF ELEMENTAL ZINC: CAPSULE at 08:12

## 2025-03-29 RX ADMIN — ZINC SULFATE 220 MG (50 MG) CAPSULE 50 MG OF ELEMENTAL ZINC: CAPSULE at 21:14

## 2025-03-29 RX ADMIN — DIVALPROEX SODIUM 500 MG: 500 TABLET, FILM COATED, EXTENDED RELEASE ORAL at 21:14

## 2025-03-29 RX ADMIN — ALBUMIN HUMAN 25 G: 0.25 SOLUTION INTRAVENOUS at 05:10

## 2025-03-29 RX ADMIN — ONDANSETRON 4 MG: 2 INJECTION INTRAMUSCULAR; INTRAVENOUS at 22:58

## 2025-03-29 RX ADMIN — TERLIPRESSIN 0.85 MG: 0.85 INJECTION, POWDER, LYOPHILIZED, FOR SOLUTION INTRAVENOUS at 14:47

## 2025-03-29 ASSESSMENT — COGNITIVE AND FUNCTIONAL STATUS - GENERAL
DRESSING REGULAR UPPER BODY CLOTHING: A LITTLE
TOILETING: A LITTLE
CLIMB 3 TO 5 STEPS WITH RAILING: A LITTLE
WALKING IN HOSPITAL ROOM: A LITTLE
STANDING UP FROM CHAIR USING ARMS: A LITTLE
MOVING FROM LYING ON BACK TO SITTING ON SIDE OF FLAT BED WITH BEDRAILS: A LITTLE
HELP NEEDED FOR BATHING: A LITTLE
MOBILITY SCORE: 18
DRESSING REGULAR LOWER BODY CLOTHING: A LITTLE
TURNING FROM BACK TO SIDE WHILE IN FLAT BAD: A LITTLE
MOVING TO AND FROM BED TO CHAIR: A LITTLE
DAILY ACTIVITIY SCORE: 19
PERSONAL GROOMING: A LITTLE

## 2025-03-29 ASSESSMENT — PAIN SCALES - GENERAL
PAINLEVEL_OUTOF10: 0 - NO PAIN
PAINLEVEL_OUTOF10: 0 - NO PAIN

## 2025-03-29 NOTE — NURSING NOTE
1700:  Patient alert and oriented throughout the day. Her .  Parents take turns staying with her. Bun was 97 today and her Cr was down to 2.28. Given 500cc lactated Ringers. Up at bedside all day and ambulated in the brown several times with use of rollator for support. Continues with pedal edema and distended abdomen

## 2025-03-29 NOTE — PROGRESS NOTES
Hepatology Consult Service Progress Note  Department of Gastroenterology & Hepatology  Digestive [facility]    [facility]  March 29, 2025   Patient: Elizabeth Toledo    Medical Record: [id number]  Reason for Initial Consult: Concerns for SBP, elevated LFTs, and elevated Cr concerning for HRS    Interval History:   - NAEO   - Worsening renal function with increasing creatinine: 4.24 (3/22) ? 3.63 (3/23) ? 3.16 (3/24) ? 2.91 (3/25) ? 2.87 (3/26) ? 2.28 (3/27) ? 2.54 (3/28) ? 2.86 (3/29)  - continued leukocytosis: 15.3 (3/22) ? 14.6 (3/23) ? 16.0 (3/24) ? 16.3 (3/25) ? 21.5 (3/26) ? 12.3 (3/27) ? 15.5 (3/28), now s/p 7 day ceftriaxone course ? 13 (3/29)    Physical Exam:    Vitals:    03/28/25 1625 03/29/25 0021 03/29/25 0329 03/29/25 0826   BP: 86/51 91/58 88/51 85/53   BP Location:       Patient Position:       Pulse: 76 71 75 88   Resp: 16   16   Temp: 36.3 °C (97.3 °F) 36 °C (96.8 °F) 36.3 °C (97.3 °F) 36.4 °C (97.5 °F)   TempSrc:       SpO2: 98% 94% 95% 97%   Weight:       Height:             Intake/Output Summary (Last 24 hours) at 3/29/2025 1109  Last data filed at 3/29/2025 1028  Gross per 24 hour   Intake 1100 ml   Output 1 ml   Net 1099 ml       Gen: well appearing, no acute distress  HEENT: PEERL, EOMI, sclera anicteric, MMM  Resp: CTAB, normal breath sounds, normal work of breathing   CV: RRR, no JVD  GI: non-tender, distended, no rebound or guarding,   MSK: warm and well perfused, 1+ AZ  Neuro: AOx3, no focal deficits, no asterixis  Skin: warm and dry, no lesions, no rashe    Labs:  CBC  Results from last 7 days   Lab Units 03/29/25  0601 03/28/25  0625 03/27/25  0526 03/26/25  0600   HEMOGLOBIN g/dL 8.2* 9.1* 8.9* 10.8*   HEMATOCRIT % 22.4* 26.9* 26.5* 31.8*   WBC AUTO x10*3/uL 13.0* 15.5* 12.3* 21.5*   PLATELETS AUTO x10*3/uL 142* 158 179 224      BMP  Results from last 7 days   Lab Units 03/29/25  0601 03/28/25  0625 03/27/25  0527 03/26/25  0600   SODIUM mmol/L  134* 137 136 136   POTASSIUM mmol/L 3.7 4.0 3.7 3.9   CHLORIDE mmol/L 97* 97* 99 98   CO2 mmol/L 22 24 25 20*   BUN mg/dL 101* 97* 90* 100*   CREATININE mg/dL 2.86* 2.54* 2.28* 2.87*   ANION GAP mmol/L 19 20 16 22*   GLUCOSE mg/dL 85 76 88 103*   CALCIUM mg/dL 9.2 9.9 9.5 10.2     LFTS  Results from last 7 days   Lab Units 03/29/25  0601 03/28/25  0625 03/27/25  0527   ALT U/L 31 41 50*   AST U/L 69* 72* 94*   ALK PHOS U/L 90 90 99   BILIRUBIN TOTAL mg/dL 1.1 1.3* 1.3*     COAGS  Results from last 7 days   Lab Units 03/28/25  0625 03/27/25  0526 03/26/25  0600   INR  1.6* 1.9* 2.0*        Imaging:  === 03/25/25 ===    US GUIDED ABDOMINAL PARACENTESIS    - Impression -  Uneventful paracentesis, as detailed above. Right Hemiabdomen, 2600 mL    I personally performed and/or directly supervised this study and was  present for the entire procedure.    Performed and dictated at [facility].    Signed by: Ignacio Mitchell 3/26/2025 4:29 PM  Dictation workstation:   [id number]  === 02/25/15 ===    CT ABDOMEN PELVIS W CONTRAST    - Impression -  1. Interval increase in size of heterogeneous mass in the right  hepatic lobe, which now appears more heterogeneous and hypodense,  although some of this appearance may relate to differences in phase  of contrast, and a higher attenuating component raises concern for  possible hemorrhage within the mass, which could again represent a  hepatic adenoma, but malignant mass or abscess cannot be excluded and  further evaluation with liver MRI is again recommended.  Smaller  subcentimeter hypodensity in the right lobe as well as ill-defined  hypodensities in segment 4 are grossly unchanged.  These can also be  further evaluated with liver MRI.      Findings discussed with Dr. Vera at 1:38 AM on 2/26/2015.  === 12/19/24 ===    MR LIVER WO AND W CONTRAST    Addendum 12/19/2024 11:21 AM -----------------------------------------------  Interpreted By:   David Navarrete,  ADDENDUM:  Increased size of portacaval and hepatic lymph nodes favoring  reactive nodes.    Signed by: David Navarrete 12/19/2024 11:21 AM    -------- ORIGINAL REPORT --------  Dictation workstation:   [id number]    - Impression -  1.  Findings compatible with cirrhosis, portal hypertension and liver  decompensation with a large amount of ascites.  2. No MRI evidence of HCC.  3. Measurable reduction of pre-existing probably benign liver lesions.      MACRO:  None    Signed by: David Navarrete 12/19/2024 10:50 AM  Dictation workstation:   [id number]    GI procedures    EGD 05/29/2025  mpression  The esophagus appeared normal.  Abnormal mucosa, consistent with gastritis in the antrum; performed cold forceps biopsy  The fundus of the stomach and body of the stomach appeared normal.  The duodenum appeared normal.  Findings  The esophagus appeared normal. Z-line is 38 cm from the incisors. No varices were seen  Erythematous mucosa with erosion in the antrum, consistent with gastritis; performed cold forceps biopsy to rule out H. pylori;  The fundus of the stomach and body of the stomach appeared normal.  The duodenum appeared normal.    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    Her recent hospital presentation (3/21) was notable for diffuse abdominal pain, leukocytosis, hyponatremia, elevated LFTs, worsening coagulopathy (INR 2.0), and DANIELLA (peak Cr 4.66), concerning for possible hepatorenal syndrome vs. pre-renal etiology in the setting of infection. Imaging demonstrated cirrhosis with ascites and mild mesenteric edema. Although gallbladder ultrasound initially raised  suspicion for acute cholecystitis (wall thickening and sludge), subsequent HIDA scan was normal (3/24). Given the clinical picture and leukocytosis, she was started empirically on IV ceftriaxone for suspected SBP vs. cholecystitis prior to transfer to [facility] on 3/25.    Since transfer, her renal function initially improved, with creatinine decreasing from 4.24 (3/22) to a america of 2.28 (3/27), but has since worsened, rising to 2.86 (3/29). Sodium has improved to 136. Leukocytosis has persisted, with an initial decrease after transfer but it subsequently increased to  now concerning for developing HRS. She is now s/p a 7-day course of IV ceftriaxone, with a current WBC of 13 (3/29). Coagulopathy remains unresolved. Diagnostic paracentesis was performed (3/26), revealing a SAAG of 3.0, negative for SBP, with cultures showing no growth to date.     Plan:  #Cryptogenic Cirrhosis  #?Tin's Disease   #DANIELLA vs HRS   #Coagulopathy with elevated INR  - Continue Zinc gluconate, 50mg BID   - Trend CBC, BMP, hepatic function panel, coags  - S/p CTX, 2g daily for 7 days (last dose )  - Miralax BID and senna daily until first bowel movement, then Miralax BID thereafter.    - Can follow up with Dr. Vann at discharge   - Strict I&Os  - Before starting terlipressin please obtain urine studies: Urine osm, urinalysis, urine electrolytes   - Start terlipressin, 0.85mg every 6 hours for 3 days  > Please monitor for the followin) Telemetry   2) Continuous pulse oximetry   3) Monitor for hypertension (can cause severe vasoconstriction)  4) Monitor respiratory status for hypoxia or signs of pulmonary edema   5) Monitor mental status   6) Serum creatinine and urine output   7) Monitor for signs of mesenteric ischemia (abdominal pain, diarrhea, nausea, vomiting, etc.)  8) Peripheral ischemia - bluing of the fingers and toes, extremity pain   9) Trend lactate  - Continue Albumin, 25g once daily (dose already given on  3/29)        Patient seen and discussed with attending, Dr. Breezy Sprague MD, PhD  Gastroenterology Fellow, PGY-6  [facility]   Division of Gastroenterology and Liver Disease       Thank you for the consultation. Gastroenterology will continue to the follow the patient.   Please do not hesitate to contact me on DocHalo or page [phone] if there are any further questions between the weekday hours of 7 AM - 5 PM.   If there is an urgent concern during the weekend, after-hours, or holidays; then please page the [place] [phone]. Thank you.    SIGNATURE: Ryley Sprague MD PATIENT NAME: Elizabeth Toledo   DATE: March 29, 2025 MRN: [id number]

## 2025-03-29 NOTE — CARE PLAN
The patient's goals for the shift include Patient will remain HDS during the shift    The clinical goals for the shift include Pt will remain safe and free from falls/injury.    Problem: Pain - Adult  Goal: Verbalizes/displays adequate comfort level or baseline comfort level  Outcome: Progressing     Problem: Safety - Adult  Goal: Free from fall injury  Outcome: Progressing     Problem: Skin  Goal: Prevent/manage excess moisture  Outcome: Progressing  Flowsheets (Taken 3/29/2025 0556)  Prevent/manage excess moisture: Moisturize dry skin

## 2025-03-29 NOTE — PROGRESS NOTES
Elizabeth Toledo is a 49 y.o. female on day 4 of admission presenting with Cirrhosis (Multi).      Subjective     No events over night,     Reports no abdominal pain, ( mother at bed side)    No c/o nausea.     No c/o fever or chills.     RN reported no new events.            Objective     Last Recorded Vitals  BP 96/62   Pulse 89   Temp 36.4 °C (97.5 °F)   Resp 16   Wt (!) 43.1 kg (95 lb 1.6 oz)   SpO2 98%   Intake/Output last 3 Shifts:    Intake/Output Summary (Last 24 hours) at 3/29/2025 1359  Last data filed at 3/29/2025 1300  Gross per 24 hour   Intake 1200 ml   Output 1 ml   Net 1199 ml       Admission Weight  Weight: (!) 43.1 kg (95 lb 1.6 oz) (03/25/25 1651)    Daily Weight  03/25/25 : (!) 43.1 kg (95 lb 1.6 oz)    Image Results  XR abdomen 2 views supine and erect or decub  Narrative: Interpreted By:  Supa Henley,  Kenny Paul   STUDY:  XR ABDOMEN 2 VIEWS SUPINE AND ERECT OR DECUB;  3/27/2025 8:11 pm      INDICATION:  Signs/Symptoms:Emesis consider Ileus or partial obs.      COMPARISON:  CT abdomen and pelvis 02/26/2015. MR liver 02/19/2024.      ACCESSION NUMBER(S):  [id number]      ORDERING CLINICIAN:  MELONY MARQUIS      FINDINGS:  Two AP views of the abdomen were provided for review.      Nonobstructive bowel gas pattern. There is large colonic stool  burden. Limited evaluation of pneumoperitoneum, however no gross  evidence of free air is noted.      Visualized lungs are clear.      Osseous structures demonstrate no acute bony changes.      Impression: 1.  Large colonic stool burden with nonobstructive bowel-gas pattern.  No gross evidence of pneumoperitoneum.      I personally reviewed the images/study and I agree with the findings  as stated by resident Humberto Raman. This study was interpreted  at [place], [place].      MACRO:  None      Signed by: Suap Henley 3/28/2025 8:54 AM  Dictation workstation:   [id number]    Vitals:     03/29/25 1232   BP: 96/62   Pulse: 89   Resp: 16   Temp: 36.4 °C (97.5 °F)   SpO2: 98%       Physical Exam  Constitutional:       Comments: Comfortable on RA at rest    HENT:      Head: Normocephalic.      Nose: Nose normal.      Mouth/Throat:      Mouth: Mucous membranes are dry.   Eyes:      Extraocular Movements: Extraocular movements intact.      Pupils: Pupils are equal, round, and reactive to light.   Cardiovascular:      Rate and Rhythm: Normal rate and regular rhythm.      Heart sounds: Normal heart sounds. No murmur heard.  Pulmonary:      Effort: No respiratory distress.      Breath sounds: Normal breath sounds. No wheezing.   Abdominal:      General: There is distension.      Palpations: Abdomen is soft.      Tenderness: There is no abdominal tenderness. There is no guarding.   Musculoskeletal:         General: Normal range of motion.      Cervical back: Normal range of motion.      Right lower leg: No edema.      Left lower leg: No edema.   Skin:     General: Skin is warm.   Neurological:      General: No focal deficit present.      Mental Status: She is alert and oriented to person, place, and time.   Psychiatric:         Mood and Affect: Mood normal.         Relevant Results  Scheduled medications  [START ON 3/30/2025] albumin human, 25 g, intravenous, Once  [START ON 3/31/2025] albumin human, 25 g, intravenous, Once  ARIPiprazole, 15 mg, oral, Daily  divalproex, 500 mg, oral, Nightly  FLUoxetine, 60 mg, oral, Daily  heparin (porcine), 5,000 Units, subcutaneous, q8h DEMETRIUS  levothyroxine, 25 mcg, oral, Daily  polyethylene glycol, 17 g, oral, Daily  sennosides-docusate sodium, 2 tablet, oral, Nightly  terlipressin, 0.85 mg, intravenous, q6h  zinc sulfate, 50 mg of elemental zinc, oral, BID      Continuous medications     PRN medications  PRN medications: ondansetron, sodium chloride    Results for orders placed or performed during the hospital encounter of 03/25/25 (from the past 24 hours)   CBC    Result Value Ref Range    WBC 13.0 (H) 4.4 - 11.3 x10*3/uL    nRBC 0.0 0.0 - 0.0 /100 WBCs    RBC 2.29 (L) 4.00 - 5.20 x10*6/uL    Hemoglobin 8.2 (L) 12.0 - 16.0 g/dL    Hematocrit 22.4 (L) 36.0 - 46.0 %    MCV 98 80 - 100 fL    MCH 35.8 (H) 26.0 - 34.0 pg    MCHC 36.6 (H) 32.0 - 36.0 g/dL    RDW 15.7 (H) 11.5 - 14.5 %    Platelets 142 (L) 150 - 450 x10*3/uL   Comprehensive Metabolic Panel   Result Value Ref Range    Glucose 85 74 - 99 mg/dL    Sodium 134 (L) 136 - 145 mmol/L    Potassium 3.7 3.5 - 5.3 mmol/L    Chloride 97 (L) 98 - 107 mmol/L    Bicarbonate 22 21 - 32 mmol/L    Anion Gap 19 10 - 20 mmol/L    Urea Nitrogen 101 (HH) 6 - 23 mg/dL    Creatinine 2.86 (H) 0.50 - 1.05 mg/dL    eGFR 19 (L) >60 mL/min/1.73m*2    Calcium 9.2 8.6 - 10.6 mg/dL    Albumin 4.4 3.4 - 5.0 g/dL    Alkaline Phosphatase 90 33 - 110 U/L    Total Protein 7.2 6.4 - 8.2 g/dL    AST 69 (H) 9 - 39 U/L    Bilirubin, Total 1.1 0.0 - 1.2 mg/dL    ALT 31 7 - 45 U/L   Protime-INR   Result Value Ref Range    Protime 15.6 (H) 9.8 - 12.4 seconds    INR 1.4 (H) 0.9 - 1.1       Assessment/Plan      49-year-old F with a PMH of Rogelio syndrome, developmental delay, cognitive and behavioral abnormalities, 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 She was started on iv Antibiotics for suspected SBP.  HIDA scan was negative for any evidence of cholecystitis. She is transferred to [facility] for management of decompensated cirrhosis, suspected SBP and DANIELLA.       #Abdominal pain   #Decompensated cirrhosis ( suspected MASH related cirrhosis)  -pt presented to OSH with abdominal pain  -gallbladder ultrasound suspicious for cholecystitis, however the HIDA scan was negative for cholecystitis   -pt was started on rocephin 3/21 for suspected SBP, WBC trended down   -c/w rocephin   S/p Paracentesis  on 3/26  Ascitic fluid analysis showed normal WBC, ( however she received several doses of antibiotics)  Hepatology consulted, recs appreciated,   Vitamin K 10 mg x 3 days D3/3.   Zinc sufate 50 mg BID  Albumin supplements 25 gms TID x 48 hrs  MELD-Na 21 on 3/28,   Continue to monitor MELD labs,        #DANIELLA:  Creatinine is 2.5,   Admission creatinine is 2.91, ( base line 1.0)   DD includes dehydration vs Hepatorenal syndrome,   Urine sodium less than 10,   -Continue to Hold lasix and aldactone   -avoid nephrotixic medications and contrast  Judicious fluid bolus  Rx with Albumin supplements 25 gms TID x 2 days with no improvement in creatinine,   Started on Terlipressin for suspected HRS  Monitor on tele, continuous pulse oxymetry  Monitor renal function,        #Anemia stable  -no signs of active bleeding  HB 10.8,   -Likely anemia of chronic disease       Hypothyroidism  continue levothyroxine     Disruptive mood disorder  Continue Depakote and Abilify         F: PRN  E:PRN  N:clear liquid  A:PIV  Code status: full code  DVT ppx: heparin subQ     Dispo: pending improvement in DANIELLA. Start Terlipressin infusion today.         Marshall Boston MD

## 2025-03-29 NOTE — CARE PLAN
Problem: Pain - Adult  Goal: Verbalizes/displays adequate comfort level or baseline comfort level  Outcome: Progressing     Problem: Safety - Adult  Goal: Free from fall injury  Outcome: Progressing     Problem: Discharge Planning  Goal: Discharge to home or other facility with appropriate resources  Outcome: Progressing     Problem: Chronic Conditions and Co-morbidities  Goal: Patient's chronic conditions and co-morbidity symptoms are monitored and maintained or improved  Outcome: Progressing     Problem: Nutrition  Goal: Nutrient intake appropriate for maintaining nutritional needs  Outcome: Progressing     Problem: Skin  Goal: Decreased wound size/increased tissue granulation at next dressing change  Outcome: Progressing  Goal: Participates in plan/prevention/treatment measures  Outcome: Progressing  Goal: Prevent/manage excess moisture  Outcome: Progressing  Goal: Prevent/minimize sheer/friction injuries  Outcome: Progressing  Goal: Promote/optimize nutrition  Outcome: Progressing  Goal: Promote skin healing  Outcome: Progressing     The clinical goals for the shift include Pt will remain HDS during the shift

## 2025-03-30 VITALS
TEMPERATURE: 97.5 F | WEIGHT: 95.1 LBS | HEIGHT: 56 IN | BODY MASS INDEX: 21.39 KG/M2 | OXYGEN SATURATION: 99 % | RESPIRATION RATE: 18 BRPM | DIASTOLIC BLOOD PRESSURE: 55 MMHG | SYSTOLIC BLOOD PRESSURE: 87 MMHG | HEART RATE: 73 BPM

## 2025-03-30 LAB
ALBUMIN SERPL BCP-MCNC: 4.6 G/DL (ref 3.4–5)
ALP SERPL-CCNC: 102 U/L (ref 33–110)
ALT SERPL W P-5'-P-CCNC: 34 U/L (ref 7–45)
ANION GAP SERPL CALC-SCNC: 21 MMOL/L (ref 10–20)
AST SERPL W P-5'-P-CCNC: 68 U/L (ref 9–39)
BACTERIA UR CULT: NORMAL
BILIRUB SERPL-MCNC: 1.4 MG/DL (ref 0–1.2)
BUN SERPL-MCNC: 99 MG/DL (ref 6–23)
CALCIUM SERPL-MCNC: 9.6 MG/DL (ref 8.6–10.6)
CHLORIDE SERPL-SCNC: 96 MMOL/L (ref 98–107)
CO2 SERPL-SCNC: 20 MMOL/L (ref 21–32)
CREAT SERPL-MCNC: 2.68 MG/DL (ref 0.5–1.05)
EGFRCR SERPLBLD CKD-EPI 2021: 21 ML/MIN/1.73M*2
ERYTHROCYTE [DISTWIDTH] IN BLOOD BY AUTOMATED COUNT: 16.4 % (ref 11.5–14.5)
GLUCOSE SERPL-MCNC: 189 MG/DL (ref 74–99)
HCT VFR BLD AUTO: 24.4 % (ref 36–46)
HGB BLD-MCNC: 8.2 G/DL (ref 12–16)
HOLD SPECIMEN: NORMAL
INR PPP: 1.6 (ref 0.9–1.1)
MCH RBC QN AUTO: 34.5 PG (ref 26–34)
MCHC RBC AUTO-ENTMCNC: 33.6 G/DL (ref 32–36)
MCV RBC AUTO: 103 FL (ref 80–100)
NRBC BLD-RTO: 0 /100 WBCS (ref 0–0)
PLATELET # BLD AUTO: 133 X10*3/UL (ref 150–450)
POTASSIUM SERPL-SCNC: 4.2 MMOL/L (ref 3.5–5.3)
PROT SERPL-MCNC: 7.3 G/DL (ref 6.4–8.2)
PROTHROMBIN TIME: 17.6 SECONDS (ref 9.8–12.4)
RBC # BLD AUTO: 2.38 X10*6/UL (ref 4–5.2)
SODIUM SERPL-SCNC: 133 MMOL/L (ref 136–145)
WBC # BLD AUTO: 12.9 X10*3/UL (ref 4.4–11.3)

## 2025-03-30 PROCEDURE — 99232 SBSQ HOSP IP/OBS MODERATE 35: CPT | Performed by: INTERNAL MEDICINE

## 2025-03-30 PROCEDURE — P9047 ALBUMIN (HUMAN), 25%, 50ML: HCPCS | Performed by: INTERNAL MEDICINE

## 2025-03-30 PROCEDURE — 2500000004 HC RX 250 GENERAL PHARMACY W/ HCPCS (ALT 636 FOR OP/ED): Performed by: INTERNAL MEDICINE

## 2025-03-30 PROCEDURE — 85027 COMPLETE CBC AUTOMATED: CPT | Performed by: INTERNAL MEDICINE

## 2025-03-30 PROCEDURE — 2500000004 HC RX 250 GENERAL PHARMACY W/ HCPCS (ALT 636 FOR OP/ED)

## 2025-03-30 PROCEDURE — 2500000001 HC RX 250 WO HCPCS SELF ADMINISTERED DRUGS (ALT 637 FOR MEDICARE OP)

## 2025-03-30 PROCEDURE — 2500000001 HC RX 250 WO HCPCS SELF ADMINISTERED DRUGS (ALT 637 FOR MEDICARE OP): Performed by: INTERNAL MEDICINE

## 2025-03-30 PROCEDURE — 85610 PROTHROMBIN TIME: CPT | Performed by: INTERNAL MEDICINE

## 2025-03-30 PROCEDURE — 36415 COLL VENOUS BLD VENIPUNCTURE: CPT | Performed by: INTERNAL MEDICINE

## 2025-03-30 PROCEDURE — 1200000002 HC GENERAL ROOM WITH TELEMETRY DAILY

## 2025-03-30 PROCEDURE — 84075 ASSAY ALKALINE PHOSPHATASE: CPT | Performed by: INTERNAL MEDICINE

## 2025-03-30 RX ORDER — DIVALPROEX SODIUM 250 MG/1
500 TABLET, FILM COATED, EXTENDED RELEASE ORAL NIGHTLY
Status: DISCONTINUED | OUTPATIENT
Start: 2025-03-30 | End: 2025-04-01 | Stop reason: HOSPADM

## 2025-03-30 RX ORDER — DIVALPROEX SODIUM 125 MG/1
250 CAPSULE, COATED PELLETS ORAL EVERY 12 HOURS SCHEDULED
Status: DISCONTINUED | OUTPATIENT
Start: 2025-03-30 | End: 2025-03-30

## 2025-03-30 RX ADMIN — FLUOXETINE HYDROCHLORIDE 60 MG: 20 CAPSULE ORAL at 06:55

## 2025-03-30 RX ADMIN — ARIPIPRAZOLE 15 MG: 15 TABLET ORAL at 08:46

## 2025-03-30 RX ADMIN — HEPARIN SODIUM 5000 UNITS: 5000 INJECTION, SOLUTION INTRAVENOUS; SUBCUTANEOUS at 06:01

## 2025-03-30 RX ADMIN — LEVOTHYROXINE SODIUM 25 MCG: 25 TABLET ORAL at 06:01

## 2025-03-30 RX ADMIN — ALBUMIN HUMAN 25 G: 0.25 SOLUTION INTRAVENOUS at 05:09

## 2025-03-30 RX ADMIN — TERLIPRESSIN 0.85 MG: 0.85 INJECTION, POWDER, LYOPHILIZED, FOR SOLUTION INTRAVENOUS at 21:08

## 2025-03-30 RX ADMIN — TERLIPRESSIN 0.85 MG: 0.85 INJECTION, POWDER, LYOPHILIZED, FOR SOLUTION INTRAVENOUS at 15:53

## 2025-03-30 RX ADMIN — ZINC SULFATE 220 MG (50 MG) CAPSULE 50 MG OF ELEMENTAL ZINC: CAPSULE at 21:02

## 2025-03-30 RX ADMIN — TERLIPRESSIN 0.85 MG: 0.85 INJECTION, POWDER, LYOPHILIZED, FOR SOLUTION INTRAVENOUS at 08:46

## 2025-03-30 RX ADMIN — ZINC SULFATE 220 MG (50 MG) CAPSULE 50 MG OF ELEMENTAL ZINC: CAPSULE at 08:46

## 2025-03-30 RX ADMIN — TERLIPRESSIN 0.85 MG: 0.85 INJECTION, POWDER, LYOPHILIZED, FOR SOLUTION INTRAVENOUS at 03:19

## 2025-03-30 ASSESSMENT — COGNITIVE AND FUNCTIONAL STATUS - GENERAL
DAILY ACTIVITIY SCORE: 19
DRESSING REGULAR LOWER BODY CLOTHING: A LITTLE
TOILETING: A LITTLE
STANDING UP FROM CHAIR USING ARMS: A LITTLE
PERSONAL GROOMING: A LITTLE
HELP NEEDED FOR BATHING: A LITTLE
CLIMB 3 TO 5 STEPS WITH RAILING: A LITTLE
DRESSING REGULAR LOWER BODY CLOTHING: A LITTLE
MOVING FROM LYING ON BACK TO SITTING ON SIDE OF FLAT BED WITH BEDRAILS: A LITTLE
WALKING IN HOSPITAL ROOM: A LITTLE
DRESSING REGULAR UPPER BODY CLOTHING: A LITTLE
TURNING FROM BACK TO SIDE WHILE IN FLAT BAD: A LITTLE
TURNING FROM BACK TO SIDE WHILE IN FLAT BAD: A LITTLE
MOVING FROM LYING ON BACK TO SITTING ON SIDE OF FLAT BED WITH BEDRAILS: A LITTLE
CLIMB 3 TO 5 STEPS WITH RAILING: A LITTLE
DRESSING REGULAR UPPER BODY CLOTHING: A LITTLE
HELP NEEDED FOR BATHING: A LITTLE
TOILETING: A LITTLE
STANDING UP FROM CHAIR USING ARMS: A LITTLE
DAILY ACTIVITIY SCORE: 18
MOBILITY SCORE: 18
MOBILITY SCORE: 18
WALKING IN HOSPITAL ROOM: A LITTLE
PERSONAL GROOMING: A LITTLE
EATING MEALS: A LITTLE
MOVING TO AND FROM BED TO CHAIR: A LITTLE
MOVING TO AND FROM BED TO CHAIR: A LITTLE

## 2025-03-30 ASSESSMENT — PAIN SCALES - GENERAL
PAINLEVEL_OUTOF10: 0 - NO PAIN

## 2025-03-30 NOTE — PROGRESS NOTES
Hepatology Consult Service Progress Note  Department of Gastroenterology & Hepatology  Digestive [facility]    [facility]  March 30, 2025   Patient: Elizabeth Toledo    Medical Record: [id number]  Reason for Initial Consult: Concerns for SBP, elevated LFTs, and elevated Cr concerning for HRS    Interval History:   - NAEO   - Worsening renal function with increasing creatinine: 4.24 (3/22) ? 3.63 (3/23) ? 3.16 (3/24) ? 2.91 (3/25) ? 2.87 (3/26) ? 2.28 (3/27) ? 2.54 (3/28) ? 2.86 (3/29), started terlipressin on 3/29 --> 2.68 (3/30)  - continued leukocytosis: 15.3 (3/22) ? 14.6 (3/23) ? 16.0 (3/24) ? 16.3 (3/25) ? 21.5 (3/26) ? 12.3 (3/27) ? 15.5 (3/28), now s/p 7 day ceftriaxone course ? 13 (3/29) --> 12.9 (3/30)    Physical Exam:    Vitals:    03/29/25 2100 03/29/25 2208 03/30/25 0007 03/30/25 0326   BP: (!) 85/47 96/58 98/52 100/58   BP Location: Left arm   Left arm   Patient Position: Lying   Lying   Pulse: 75  58 63   Resp: 16  17 16   Temp: 36.5 °C (97.7 °F)  36.3 °C (97.3 °F) 36.5 °C (97.7 °F)   TempSrc: Temporal      SpO2: 97%  94% 95%   Weight:       Height:             Intake/Output Summary (Last 24 hours) at 3/30/2025 0710  Last data filed at 3/30/2025 0509  Gross per 24 hour   Intake 1070 ml   Output 100 ml   Net 970 ml       Gen: well appearing, no acute distress  HEENT: PEERL, EOMI, sclera anicteric, MMM  Resp: CTAB, normal breath sounds, normal work of breathing   CV: RRR, no JVD  GI: non-tender, distended, no rebound or guarding,   MSK: warm and well perfused, 1+ AZ  Neuro: AOx3, no focal deficits, no asterixis  Skin: warm and dry, no lesions, no rashe    Labs:  CBC  Results from last 7 days   Lab Units 03/29/25  0601 03/28/25  0625 03/27/25  0526 03/26/25  0600   HEMOGLOBIN g/dL 8.2* 9.1* 8.9* 10.8*   HEMATOCRIT % 22.4* 26.9* 26.5* 31.8*   WBC AUTO x10*3/uL 13.0* 15.5* 12.3* 21.5*   PLATELETS AUTO x10*3/uL 142* 158 179 224      BMP  Results from last 7 days   Lab  Units 03/29/25  0601 03/28/25  0625 03/27/25  0527 03/26/25  0600   SODIUM mmol/L 134* 137 136 136   POTASSIUM mmol/L 3.7 4.0 3.7 3.9   CHLORIDE mmol/L 97* 97* 99 98   CO2 mmol/L 22 24 25 20*   BUN mg/dL 101* 97* 90* 100*   CREATININE mg/dL 2.86* 2.54* 2.28* 2.87*   ANION GAP mmol/L 19 20 16 22*   GLUCOSE mg/dL 85 76 88 103*   CALCIUM mg/dL 9.2 9.9 9.5 10.2     LFTS  Results from last 7 days   Lab Units 03/29/25  0601 03/28/25  0625 03/27/25  0527   ALT U/L 31 41 50*   AST U/L 69* 72* 94*   ALK PHOS U/L 90 90 99   BILIRUBIN TOTAL mg/dL 1.1 1.3* 1.3*     COAGS  Results from last 7 days   Lab Units 03/29/25  1022 03/28/25  0625 03/27/25  0526   INR  1.4* 1.6* 1.9*        Imaging:  === 03/25/25 ===    US GUIDED ABDOMINAL PARACENTESIS    - Impression -  Uneventful paracentesis, as detailed above. Right Hemiabdomen, 2600 mL    I personally performed and/or directly supervised this study and was  present for the entire procedure.    Performed and dictated at [facility].    Signed by: Ignacio Mitchell 3/26/2025 4:29 PM  Dictation workstation:   [id number]  === 02/25/15 ===    CT ABDOMEN PELVIS W CONTRAST    - Impression -  1. Interval increase in size of heterogeneous mass in the right  hepatic lobe, which now appears more heterogeneous and hypodense,  although some of this appearance may relate to differences in phase  of contrast, and a higher attenuating component raises concern for  possible hemorrhage within the mass, which could again represent a  hepatic adenoma, but malignant mass or abscess cannot be excluded and  further evaluation with liver MRI is again recommended.  Smaller  subcentimeter hypodensity in the right lobe as well as ill-defined  hypodensities in segment 4 are grossly unchanged.  These can also be  further evaluated with liver MRI.      Findings discussed with Dr. Vera at 1:38 AM on 2/26/2015.  === 12/19/24 ===    MR LIVER WO AND W CONTRAST    Addendum  12/19/2024 11:21 AM -----------------------------------------------  Interpreted By:  David Navarrete,  ADDENDUM:  Increased size of portacaval and hepatic lymph nodes favoring  reactive nodes.    Signed by: David Navarrete 12/19/2024 11:21 AM    -------- ORIGINAL REPORT --------  Dictation workstation:   [id number]    - Impression -  1.  Findings compatible with cirrhosis, portal hypertension and liver  decompensation with a large amount of ascites.  2. No MRI evidence of HCC.  3. Measurable reduction of pre-existing probably benign liver lesions.      MACRO:  None    Signed by: David Navarrete 12/19/2024 10:50 AM  Dictation workstation:   [id number]    GI procedures    EGD 05/29/2025  mpression  The esophagus appeared normal.  Abnormal mucosa, consistent with gastritis in the antrum; performed cold forceps biopsy  The fundus of the stomach and body of the stomach appeared normal.  The duodenum appeared normal.  Findings  The esophagus appeared normal. Z-line is 38 cm from the incisors. No varices were seen  Erythematous mucosa with erosion in the antrum, consistent with gastritis; performed cold forceps biopsy to rule out H. pylori;  The fundus of the stomach and body of the stomach appeared normal.  The duodenum appeared normal.    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    Her recent hospital presentation (3/21) was notable for diffuse abdominal pain, leukocytosis, hyponatremia, elevated LFTs, worsening coagulopathy (INR 2.0), and DANIELLA (peak Cr 4.66), concerning for possible hepatorenal syndrome vs. pre-renal etiology in the setting of infection. Imaging demonstrated cirrhosis  with ascites and mild mesenteric edema. Although gallbladder ultrasound initially raised suspicion for acute cholecystitis (wall thickening and sludge), subsequent HIDA scan was normal (3/24). Given the clinical picture and leukocytosis, she was started empirically on IV ceftriaxone for suspected SBP vs. cholecystitis prior to transfer to [facility] on 3/25.    Since transfer, her renal function initially improved, with creatinine decreasing from 4.24 (3/22) to a america of 2.28 (3/27), but has since worsened, rising to 2.86 (3/29). Sodium has improved to 136. Leukocytosis has persisted, with an initial decrease after transfer but it subsequently increased to  now concerning for developing HRS. She is now s/p a 7-day course of IV ceftriaxone, with a current WBC of 13 (3/29). Coagulopathy remains unresolved. Diagnostic paracentesis was performed (3/26), revealing a SAAG of 3.0, negative for SBP, with cultures showing no growth to date.     Plan:  #Cryptogenic Cirrhosis  #?Tin's Disease   #DANIELLA vs HRS   #Coagulopathy with elevated INR  - Continue Zinc gluconate, 50mg BID   - Trend CBC, BMP, hepatic function panel, coags  - S/p CTX, 2g daily for 7 days (last dose )  - Miralax BID and senna daily until first bowel movement, then Miralax BID thereafter.    - Can follow up with Dr. Vann at discharge   - Strict I&Os  - Continue 0.85mg every 6 hours for 3 days (started 3/29)  > Please monitor for the followin) Telemetry   2) Continuous pulse oximetry   3) Monitor for hypertension (can cause severe vasoconstriction)  4) Monitor respiratory status for hypoxia or signs of pulmonary edema   5) Monitor mental status   6) Serum creatinine and urine output   7) Monitor for signs of mesenteric ischemia (abdominal pain, diarrhea, nausea, vomiting, etc.)  8) Peripheral ischemia - bluing of the fingers and toes, extremity pain   9) Trend lactate  - Continue Albumin, 25g once daily while on terlipressin  - Continue to trend MELD  labs daily        Patient seen and discussed with attending, Dr. Silva Vásquez MD  [facility]   Division of Gastroenterology and Liver Disease       Thank you for the consultation. Gastroenterology will continue to the follow the patient.   Please do not hesitate to contact me on DocHalo or page [phone] if there are any further questions between the weekday hours of 7 AM - 5 PM.   If there is an urgent concern during the weekend, after-hours, or holidays; then please page the on-call GI fellow at [phone]. Thank you.    SIGNATURE: Susanna Vásquez MD PATIENT NAME: Elizabeth Toledo   DATE: March 30, 2025 MRN: [id number]

## 2025-03-30 NOTE — PROGRESS NOTES
Elizabeth Toledo is a 49 y.o. female on day 5 of admission presenting with Cirrhosis (Multi).      Subjective     No events over night,     Reports no abdominal pain, ( Father is at bed side)    No c/o nausea.     No c/o fever or chills.     RN reported no new events.            Objective     Last Recorded Vitals  /58 (BP Location: Left arm, Patient Position: Lying)   Pulse 63   Temp 36.5 °C (97.7 °F)   Resp 16   Wt (!) 43.1 kg (95 lb 1.6 oz)   SpO2 95%   Intake/Output last 3 Shifts:    Intake/Output Summary (Last 24 hours) at 3/30/2025 0755  Last data filed at 3/30/2025 0743  Gross per 24 hour   Intake 1170 ml   Output 100 ml   Net 1070 ml       Admission Weight  Weight: (!) 43.1 kg (95 lb 1.6 oz) (03/25/25 1651)    Daily Weight  03/25/25 : (!) 43.1 kg (95 lb 1.6 oz)    Image Results  XR abdomen 2 views supine and erect or decub  Narrative: Interpreted By:  Supa Henley,  Kenny Paul   STUDY:  XR ABDOMEN 2 VIEWS SUPINE AND ERECT OR DECUB;  3/27/2025 8:11 pm      INDICATION:  Signs/Symptoms:Emesis consider Ileus or partial obs.      COMPARISON:  CT abdomen and pelvis 02/26/2015. MR liver 02/19/2024.      ACCESSION NUMBER(S):  [id number]      ORDERING CLINICIAN:  MELONY MARQUIS      FINDINGS:  Two AP views of the abdomen were provided for review.      Nonobstructive bowel gas pattern. There is large colonic stool  burden. Limited evaluation of pneumoperitoneum, however no gross  evidence of free air is noted.      Visualized lungs are clear.      Osseous structures demonstrate no acute bony changes.      Impression: 1.  Large colonic stool burden with nonobstructive bowel-gas pattern.  No gross evidence of pneumoperitoneum.      I personally reviewed the images/study and I agree with the findings  as stated by resident Humberto Raman. This study was interpreted  at [place], [place].      MACRO:  None      Signed by: Supa Henley 3/28/2025 8:54  AM  Dictation workstation:   [id number]    Vitals:    03/30/25 0326   BP: 100/58   Pulse: 63   Resp: 16   Temp: 36.5 °C (97.7 °F)   SpO2: 95%       Physical Exam  Constitutional:       Comments: Comfortable on RA at rest    HENT:      Head: Normocephalic.      Nose: Nose normal.      Mouth/Throat:      Mouth: Mucous membranes are dry.   Eyes:      Extraocular Movements: Extraocular movements intact.      Pupils: Pupils are equal, round, and reactive to light.   Cardiovascular:      Rate and Rhythm: Normal rate and regular rhythm.      Heart sounds: Normal heart sounds. No murmur heard.  Pulmonary:      Effort: No respiratory distress.      Breath sounds: Normal breath sounds. No wheezing.   Abdominal:      General: There is distension.      Palpations: Abdomen is soft.      Tenderness: There is no abdominal tenderness. There is no guarding.   Musculoskeletal:         General: Normal range of motion.      Cervical back: Normal range of motion.      Right lower leg: No edema.      Left lower leg: No edema.   Skin:     General: Skin is warm.   Neurological:      General: No focal deficit present.      Mental Status: She is alert and oriented to person, place, and time.   Psychiatric:         Mood and Affect: Mood normal.         Relevant Results  Scheduled medications  [START ON 3/31/2025] albumin human, 25 g, intravenous, Once  ARIPiprazole, 15 mg, oral, Daily  divalproex, 500 mg, oral, Nightly  FLUoxetine, 60 mg, oral, Daily  heparin (porcine), 5,000 Units, subcutaneous, q8h DEMETRIUS  levothyroxine, 25 mcg, oral, Daily  polyethylene glycol, 17 g, oral, Daily  sennosides-docusate sodium, 2 tablet, oral, Nightly  terlipressin, 0.85 mg, intravenous, q6h  zinc sulfate, 50 mg of elemental zinc, oral, BID      Continuous medications     PRN medications  PRN medications: ondansetron, sodium chloride    Results for orders placed or performed during the hospital encounter of 03/25/25 (from the past 24 hours)   Protime-INR    Result Value Ref Range    Protime 15.6 (H) 9.8 - 12.4 seconds    INR 1.4 (H) 0.9 - 1.1   Urine electrolytes   Result Value Ref Range    Sodium, Urine Random <10 mmol/L    Sodium/Creatinine Ratio      Potassium, Urine Random 47 mmol/L    Potassium/Creatinine Ratio 26 Not established mmol/g Creat    Chloride, Urine Random <15 mmol/L    Chloride/Creatinine Ratio      Creatinine, Urine Random 179.7 20.0 - 320.0 mg/dL   Osmolality, urine   Result Value Ref Range    Osmolality, Urine Random 352 200 - 1,200 mOsm/kg   Urinalysis with Reflex Culture and Microscopic   Result Value Ref Range    Color, Urine Yellow Light-Yellow, Yellow, Dark-Yellow    Appearance, Urine Clear Clear    Specific Gravity, Urine 1.022 1.005 - 1.035    pH, Urine 5.5 5.0, 5.5, 6.0, 6.5, 7.0, 7.5, 8.0    Protein, Urine 30 (1+) (A) NEGATIVE, 10 (TRACE), 20 (TRACE) mg/dL    Glucose, Urine Normal Normal mg/dL    Blood, Urine NEGATIVE NEGATIVE mg/dL    Ketones, Urine TRACE (A) NEGATIVE mg/dL    Bilirubin, Urine NEGATIVE NEGATIVE mg/dL    Urobilinogen, Urine Normal Normal mg/dL    Nitrite, Urine NEGATIVE NEGATIVE    Leukocyte Esterase, Urine 75 Daphne/uL (A) NEGATIVE   Extra Urine Gray Tube   Result Value Ref Range    Extra Tube Hold for add-ons.    Microscopic Only, Urine   Result Value Ref Range    WBC, Urine 1-5 1-5, NONE /HPF    RBC, Urine NONE NONE, 1-2, 3-5 /HPF    Squamous Epithelial Cells, Urine 1-9 (SPARSE) Reference range not established. /HPF    Mucus, Urine FEW Reference range not established. 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 She was started on iv Antibiotics for suspected SBP.  HIDA scan was negative for any evidence of cholecystitis. She is transferred to [facility] for management of decompensated cirrhosis, suspected SBP and DANIELLA.       #Abdominal pain   #H/o Decompensated cirrhosis ( suspected MASH related cirrhosis)  Follows with Dr Vann, gets periodic paracentesis for ascites prior to this admission,   -pt presented to [facility] with abdominal pain  -gallbladder ultrasound suspicious for cholecystitis, however the HIDA scan was negative for cholecystitis   -pt was started on rocephin on 3/21 for suspected SBP, WBC trended down   Completed 7 days of Rocephin on 3/27.   S/p Paracentesis on 3/26  Ascitic fluid analysis showed normal WBC, ( however she received several doses of antibiotics, prior to tap)  Hepatology consulted, recs appreciated,   Vitamin K 10 mg x 3 days completed 3/29,    Zinc sufate 50 mg BID  Albumin supplements 25 gms TID x 48 hrs, with no improvement in DANIELLA,   MELD-Na 21 on 3/28,   Continue to monitor MELD labs,        #DANIELLA:  Creatinine is 2.68,   Admission creatinine is 2.91, ( base line 1.0)   DD includes dehydration vs Hepatorenal syndrome,   Urine sodium less than 10,   Continue to Hold lasix and aldactone   Avoid nephrotixic medications and contrast  Judicious fluid bolus  Rx with Albumin supplements 25 gms TID x 2 days with no improvement in creatinine,   Started on Terlipressin for suspected HRS on 3/29,   Monitor on tele, continuous pulse oxymetry  Monitor renal function,        #Anemia stable  -no signs of active bleeding  HB 8.2, ,   -Likely anemia of chronic disease       Hypothyroidism  continue levothyroxine     Disruptive mood disorder  Continue Depakote and Abilify         F: PRN  E:PRN  N:clear liquid  A:PIV  Code status: full code  DVT ppx: heparin subQ     Dispo: pending improvement in DANIELLA. Started on Terlipressin infusion on 3/29 ( total 3 day infusion).          Marshall Boston MD

## 2025-03-30 NOTE — CARE PLAN
Problem: Pain - Adult  Goal: Verbalizes/displays adequate comfort level or baseline comfort level  Outcome: Progressing     Problem: Safety - Adult  Goal: Free from fall injury  Outcome: Progressing     Problem: Discharge Planning  Goal: Discharge to home or other facility with appropriate resources  Outcome: Progressing     Problem: Chronic Conditions and Co-morbidities  Goal: Patient's chronic conditions and co-morbidity symptoms are monitored and maintained or improved  Outcome: Progressing     Problem: Nutrition  Goal: Nutrient intake appropriate for maintaining nutritional needs  Outcome: Progressing     Problem: Skin  Goal: Decreased wound size/increased tissue granulation at next dressing change  Outcome: Progressing  Goal: Participates in plan/prevention/treatment measures  Outcome: Progressing  Goal: Prevent/manage excess moisture  Outcome: Progressing  Goal: Prevent/minimize sheer/friction injuries  Outcome: Progressing  Goal: Promote/optimize nutrition  Outcome: Progressing  Goal: Promote skin healing  Outcome: Progressing       The clinical goals for the shift include Patient  will remain HDS during the shift

## 2025-03-31 LAB
ALBUMIN SERPL BCP-MCNC: 5 G/DL (ref 3.4–5)
ALP SERPL-CCNC: 100 U/L (ref 33–110)
ALT SERPL W P-5'-P-CCNC: 33 U/L (ref 7–45)
ANION GAP SERPL CALC-SCNC: 20 MMOL/L (ref 10–20)
AST SERPL W P-5'-P-CCNC: 64 U/L (ref 9–39)
BILIRUB SERPL-MCNC: 1.4 MG/DL (ref 0–1.2)
BUN SERPL-MCNC: 110 MG/DL (ref 6–23)
CALCIUM SERPL-MCNC: 9.7 MG/DL (ref 8.6–10.6)
CHLORIDE SERPL-SCNC: 96 MMOL/L (ref 98–107)
CO2 SERPL-SCNC: 18 MMOL/L (ref 21–32)
CREAT SERPL-MCNC: 3.05 MG/DL (ref 0.5–1.05)
EGFRCR SERPLBLD CKD-EPI 2021: 18 ML/MIN/1.73M*2
ERYTHROCYTE [DISTWIDTH] IN BLOOD BY AUTOMATED COUNT: 16.4 % (ref 11.5–14.5)
GLUCOSE SERPL-MCNC: 211 MG/DL (ref 74–99)
HCT VFR BLD AUTO: 23 % (ref 36–46)
HGB BLD-MCNC: 7.9 G/DL (ref 12–16)
INR PPP: 1.4 (ref 0.9–1.1)
MCH RBC QN AUTO: 35.4 PG (ref 26–34)
MCHC RBC AUTO-ENTMCNC: 34.3 G/DL (ref 32–36)
MCV RBC AUTO: 103 FL (ref 80–100)
NRBC BLD-RTO: 0 /100 WBCS (ref 0–0)
PLATELET # BLD AUTO: 151 X10*3/UL (ref 150–450)
POTASSIUM SERPL-SCNC: 4.3 MMOL/L (ref 3.5–5.3)
PROT SERPL-MCNC: 7.6 G/DL (ref 6.4–8.2)
PROTHROMBIN TIME: 15.8 SECONDS (ref 9.8–12.4)
RBC # BLD AUTO: 2.23 X10*6/UL (ref 4–5.2)
SODIUM SERPL-SCNC: 130 MMOL/L (ref 136–145)
WBC # BLD AUTO: 12.7 X10*3/UL (ref 4.4–11.3)

## 2025-03-31 PROCEDURE — 85610 PROTHROMBIN TIME: CPT | Performed by: INTERNAL MEDICINE

## 2025-03-31 PROCEDURE — 85027 COMPLETE CBC AUTOMATED: CPT | Performed by: INTERNAL MEDICINE

## 2025-03-31 PROCEDURE — 1200000002 HC GENERAL ROOM WITH TELEMETRY DAILY

## 2025-03-31 PROCEDURE — 2500000001 HC RX 250 WO HCPCS SELF ADMINISTERED DRUGS (ALT 637 FOR MEDICARE OP): Performed by: STUDENT IN AN ORGANIZED HEALTH CARE EDUCATION/TRAINING PROGRAM

## 2025-03-31 PROCEDURE — 80053 COMPREHEN METABOLIC PANEL: CPT | Performed by: INTERNAL MEDICINE

## 2025-03-31 PROCEDURE — 2500000001 HC RX 250 WO HCPCS SELF ADMINISTERED DRUGS (ALT 637 FOR MEDICARE OP)

## 2025-03-31 PROCEDURE — P9047 ALBUMIN (HUMAN), 25%, 50ML: HCPCS | Performed by: INTERNAL MEDICINE

## 2025-03-31 PROCEDURE — 99232 SBSQ HOSP IP/OBS MODERATE 35: CPT | Performed by: INTERNAL MEDICINE

## 2025-03-31 PROCEDURE — 36415 COLL VENOUS BLD VENIPUNCTURE: CPT | Performed by: INTERNAL MEDICINE

## 2025-03-31 PROCEDURE — 99233 SBSQ HOSP IP/OBS HIGH 50: CPT | Performed by: STUDENT IN AN ORGANIZED HEALTH CARE EDUCATION/TRAINING PROGRAM

## 2025-03-31 PROCEDURE — 2500000004 HC RX 250 GENERAL PHARMACY W/ HCPCS (ALT 636 FOR OP/ED): Performed by: INTERNAL MEDICINE

## 2025-03-31 PROCEDURE — 2500000001 HC RX 250 WO HCPCS SELF ADMINISTERED DRUGS (ALT 637 FOR MEDICARE OP): Performed by: INTERNAL MEDICINE

## 2025-03-31 RX ORDER — ALBUMIN HUMAN 250 G/1000ML
25 SOLUTION INTRAVENOUS 2 TIMES DAILY
Status: DISCONTINUED | OUTPATIENT
Start: 2025-03-31 | End: 2025-04-01 | Stop reason: HOSPADM

## 2025-03-31 RX ADMIN — FLUOXETINE HYDROCHLORIDE 60 MG: 20 CAPSULE ORAL at 06:35

## 2025-03-31 RX ADMIN — TERLIPRESSIN 1.7 MG: 0.85 INJECTION, POWDER, LYOPHILIZED, FOR SOLUTION INTRAVENOUS at 21:09

## 2025-03-31 RX ADMIN — ARIPIPRAZOLE 15 MG: 15 TABLET ORAL at 09:24

## 2025-03-31 RX ADMIN — LEVOTHYROXINE SODIUM 25 MCG: 25 TABLET ORAL at 05:46

## 2025-03-31 RX ADMIN — DIVALPROEX SODIUM 500 MG: 250 TABLET, EXTENDED RELEASE ORAL at 21:10

## 2025-03-31 RX ADMIN — ZINC SULFATE 220 MG (50 MG) CAPSULE 50 MG OF ELEMENTAL ZINC: CAPSULE at 09:24

## 2025-03-31 RX ADMIN — ZINC SULFATE 220 MG (50 MG) CAPSULE 50 MG OF ELEMENTAL ZINC: CAPSULE at 21:08

## 2025-03-31 RX ADMIN — TERLIPRESSIN 1.7 MG: 0.85 INJECTION, POWDER, LYOPHILIZED, FOR SOLUTION INTRAVENOUS at 16:30

## 2025-03-31 RX ADMIN — ALBUMIN HUMAN 25 G: 0.25 SOLUTION INTRAVENOUS at 21:09

## 2025-03-31 RX ADMIN — DIVALPROEX SODIUM 500 MG: 250 TABLET, EXTENDED RELEASE ORAL at 00:22

## 2025-03-31 RX ADMIN — TERLIPRESSIN 0.85 MG: 0.85 INJECTION, POWDER, LYOPHILIZED, FOR SOLUTION INTRAVENOUS at 02:57

## 2025-03-31 RX ADMIN — ALBUMIN HUMAN 25 G: 0.25 SOLUTION INTRAVENOUS at 05:35

## 2025-03-31 RX ADMIN — ALBUMIN HUMAN 25 G: 0.25 SOLUTION INTRAVENOUS at 11:53

## 2025-03-31 ASSESSMENT — PAIN SCALES - GENERAL
PAINLEVEL_OUTOF10: 0 - NO PAIN

## 2025-03-31 ASSESSMENT — PAIN SCALES - WONG BAKER: WONGBAKER_NUMERICALRESPONSE: NO HURT

## 2025-03-31 NOTE — CARE PLAN
Problem: Safety - Adult  Goal: Free from fall injury  Outcome: Progressing     Problem: Chronic Conditions and Co-morbidities  Goal: Patient's chronic conditions and co-morbidity symptoms are monitored and maintained or improved  Outcome: Progressing     Problem: Skin  Goal: Decreased wound size/increased tissue granulation at next dressing change  Outcome: Progressing  Flowsheets (Taken 3/31/2025 0331)  Decreased wound size/increased tissue granulation at next dressing change: Protective dressings over bony prominences  Goal: Participates in plan/prevention/treatment measures  Outcome: Progressing  Flowsheets (Taken 3/31/2025 0331)  Participates in plan/prevention/treatment measures: Elevate heels  Goal: Prevent/manage excess moisture  Outcome: Progressing  Flowsheets (Taken 3/31/2025 0331)  Prevent/manage excess moisture:   Cleanse incontinence/protect with barrier cream   Moisturize dry skin  Goal: Prevent/minimize sheer/friction injuries  Outcome: Progressing  Flowsheets (Taken 3/31/2025 0331)  Prevent/minimize sheer/friction injuries:   Increase activity/out of bed for meals   Turn/reposition every 2 hours/use positioning/transfer devices  Goal: Promote/optimize nutrition  Outcome: Progressing  Flowsheets (Taken 3/31/2025 0331)  Promote/optimize nutrition:   Monitor/record intake including meals   Offer water/supplements/favorite foods   The patient's goals for the shift include Patient will remain HDS during the shift    The clinical goals for the shift include Patient's BP will remain above 80 mmHg systolic.

## 2025-03-31 NOTE — PROGRESS NOTES
Elizabeth Toledo is a 49 y.o. female on day 6 of admission presenting with Cirrhosis (Multi).      Subjective     No events over night,     Reports no abdominal pain, ( Mother is at bed side)    No c/o nausea.     No c/o fever or chills.     RN reported no new events.            Objective     Last Recorded Vitals  BP 94/52   Pulse 79   Temp 36.1 °C (97 °F)   Resp 18   Wt (!) 43.1 kg (95 lb 1.6 oz)   SpO2 99%   Intake/Output last 3 Shifts:    Intake/Output Summary (Last 24 hours) at 3/31/2025 1522  Last data filed at 3/31/2025 1325  Gross per 24 hour   Intake 500 ml   Output 201 ml   Net 299 ml       Admission Weight  Weight: (!) 43.1 kg (95 lb 1.6 oz) (03/25/25 1651)    Daily Weight  03/25/25 : (!) 43.1 kg (95 lb 1.6 oz)    Image Results  XR abdomen 2 views supine and erect or decub  Narrative: Interpreted By:  Supa Henley and Ritchie Brandon   STUDY:  XR ABDOMEN 2 VIEWS SUPINE AND ERECT OR DECUB;  3/27/2025 8:11 pm      INDICATION:  Signs/Symptoms:Emesis consider Ileus or partial obs.      COMPARISON:  CT abdomen and pelvis 02/26/2015. MR liver 02/19/2024.      ACCESSION NUMBER(S):  [id number]      ORDERING CLINICIAN:  MELONY MARQUIS      FINDINGS:  Two AP views of the abdomen were provided for review.      Nonobstructive bowel gas pattern. There is large colonic stool  burden. Limited evaluation of pneumoperitoneum, however no gross  evidence of free air is noted.      Visualized lungs are clear.      Osseous structures demonstrate no acute bony changes.      Impression: 1.  Large colonic stool burden with nonobstructive bowel-gas pattern.  No gross evidence of pneumoperitoneum.      I personally reviewed the images/study and I agree with the findings  as stated by resident Humberto Raman. This study was interpreted  at [facility], [place], [place].      MACRO:  None      Signed by: Supa Henley 3/28/2025 8:54 AM  Dictation workstation:   [id number]    Vitals:     03/31/25 1313   BP: 94/52   Pulse: 79   Resp: 18   Temp: 36.1 °C (97 °F)   SpO2: 99%       Physical Exam  Constitutional:       Comments: Comfortable on RA at rest    HENT:      Head: Normocephalic.      Nose: Nose normal.      Mouth/Throat:      Mouth: Mucous membranes are dry.   Eyes:      Extraocular Movements: Extraocular movements intact.      Pupils: Pupils are equal, round, and reactive to light.   Cardiovascular:      Rate and Rhythm: Normal rate and regular rhythm.      Heart sounds: Normal heart sounds. No murmur heard.  Pulmonary:      Effort: No respiratory distress.      Breath sounds: Normal breath sounds. No wheezing.   Abdominal:      General: There is distension.      Palpations: Abdomen is soft.      Tenderness: There is no abdominal tenderness. There is no guarding.   Musculoskeletal:         General: Normal range of motion.      Cervical back: Normal range of motion.      Right lower leg: No edema.      Left lower leg: No edema.   Skin:     General: Skin is warm.   Neurological:      General: No focal deficit present.      Mental Status: She is alert and oriented to person, place, and time.   Psychiatric:         Mood and Affect: Mood normal.         Relevant Results  Scheduled medications  albumin human, 25 g, intravenous, BID  ARIPiprazole, 15 mg, oral, Daily  divalproex, 500 mg, oral, Nightly  FLUoxetine, 60 mg, oral, Daily  heparin (porcine), 5,000 Units, subcutaneous, q8h DEMETRIUS  levothyroxine, 25 mcg, oral, Daily  polyethylene glycol, 17 g, oral, Daily  sennosides-docusate sodium, 2 tablet, oral, Nightly  terlipressin, 1.7 mg, intravenous, q6h  zinc sulfate, 50 mg of elemental zinc, oral, BID      Continuous medications     PRN medications  PRN medications: ondansetron, sodium chloride    Results for orders placed or performed during the hospital encounter of 03/25/25 (from the past 24 hours)   Protime-INR   Result Value Ref Range    Protime 15.8 (H) 9.8 - 12.4 seconds    INR 1.4 (H) 0.9 -  1.1   Comprehensive Metabolic Panel   Result Value Ref Range    Glucose 211 (H) 74 - 99 mg/dL    Sodium 130 (L) 136 - 145 mmol/L    Potassium 4.3 3.5 - 5.3 mmol/L    Chloride 96 (L) 98 - 107 mmol/L    Bicarbonate 18 (L) 21 - 32 mmol/L    Anion Gap 20 10 - 20 mmol/L    Urea Nitrogen 110 (HH) 6 - 23 mg/dL    Creatinine 3.05 (H) 0.50 - 1.05 mg/dL    eGFR 18 (L) >60 mL/min/1.73m*2    Calcium 9.7 8.6 - 10.6 mg/dL    Albumin 5.0 3.4 - 5.0 g/dL    Alkaline Phosphatase 100 33 - 110 U/L    Total Protein 7.6 6.4 - 8.2 g/dL    AST 64 (H) 9 - 39 U/L    Bilirubin, Total 1.4 (H) 0.0 - 1.2 mg/dL    ALT 33 7 - 45 U/L   CBC   Result Value Ref Range    WBC 12.7 (H) 4.4 - 11.3 x10*3/uL    nRBC 0.0 0.0 - 0.0 /100 WBCs    RBC 2.23 (L) 4.00 - 5.20 x10*6/uL    Hemoglobin 7.9 (L) 12.0 - 16.0 g/dL    Hematocrit 23.0 (L) 36.0 - 46.0 %     (H) 80 - 100 fL    MCH 35.4 (H) 26.0 - 34.0 pg    MCHC 34.3 32.0 - 36.0 g/dL    RDW 16.4 (H) 11.5 - 14.5 %    Platelets 151 150 - 450 x10*3/uL       Assessment/Plan      49-year-old F with a PMH of Rogelio syndrome, developmental delay, cognitive and behavioral abnormalities, 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 She was started on iv Antibiotics for suspected SBP.  HIDA scan was negative for any evidence of cholecystitis. She is transferred to [facility] for management of decompensated cirrhosis, suspected SBP and DANIELLA.       #Abdominal pain   #H/o Decompensated cirrhosis ( suspected MASH related cirrhosis)  EGD 5/24, no esophageal or gastric varices,   No h/o encephalopathy.   Follows with Dr Vann, gets periodic paracentesis for ascites prior to this admission,   -pt presented to [facility] with abdominal pain  -gallbladder ultrasound suspicious for cholecystitis, however the HIDA scan was negative for cholecystitis   -pt was started  on rocephin on 3/21 for suspected SBP, WBC trended down   Completed 7 days of Rocephin on 3/27.   S/p Paracentesis on 3/26  Ascitic fluid analysis showed normal WBC, ( however she received several doses of antibiotics, prior to tap)  Hepatology consulted, recs appreciated,   Vitamin K 10 mg x 3 days completed 3/29,    Zinc sufate 50 mg BID  Albumin supplements 25 gms TID x 48 hrs, with no improvement in DANIELLA,   MELD-Na 26 on 3/31,   Continue to monitor MELD labs,     Ascites:  Due to decompensated cirrhosis  Diuretics on Hold due to DANIELLA  On weekly paracentesis prior to admission.   S/p last Paracentesis on 3/26 in the hospital,   Completed empirical antibiotics for SBP on 3/27.        #DANIELLA:  Creatinine is still elevated at 3.05,   Admission creatinine is 2.91, ( base line 1.0)   DD includes dehydration vs Hepatorenal syndrome,   Urine sodium less than 10,   Continue to Hold lasix and aldactone   Avoid nephrotixic medications and contrast  Judicious fluid bolus  Rx with Albumin supplements 25 gms TID x 2 days with no improvement in creatinine,   Started on Terlipressin for suspected HRS on 3/29,   Monitor on tele, continuous pulse oxymetry  No response to Terlipressin 0.85 mg dose,   Increase Terlipressin dose to 1.7 mg Q 6hrs, ( 3 day treatment course)  Increase Albumin to 25 gms BID,   Continue to monitor Renal function,      #Anemia stable  -no signs of active bleeding  HB 7.9,   -Likely anemia of chronic disease and Blood draws,   B12 normal,   No external bleeding,   Monitor,       Hypothyroidism  continue levothyroxine     Disruptive mood disorder  Continue Depakote and Abilify         F: PRN  E:PRN  N:clear liquid  A:PIV  Code status: full code  DVT ppx: heparin subQ     Dispo: pending improvement in DANIELLA. Started on Terlipressin infusion on 3/29.         Marshall Boston MD

## 2025-03-31 NOTE — PROGRESS NOTES
3/31/25 1212 Transitional Care Coordinator Notes:     Transitional Care Coordination Progress Note:  Patient discussed during interdisciplinary rounds.   Team members present: MD and TCC  Plan per Medical/Surgical team: Patient renal functioning is worsening. MD to increase Terlipressin dosage to see if renal function improves. Hepatology is recommending a liver transplant for concerns for Tni's disease. Patient parents are refusing at this time.  Payor: Medicare  Discharge disposition: home  Potential Barriers: none  ADOD:  1-3 days                    Assessment/Plan   Assessment & Plan  Cirrhosis (Multi)               Debra Tsang RN

## 2025-03-31 NOTE — PROGRESS NOTES
Hepatology Consult Service Progress Note  Department of Gastroenterology & Hepatology  Digestive [facility]    [facility]  March 31, 2025   Patient: Elizabeth Toledo    Medical Record: [id number]  Reason for Initial Consult: Concerns for SBP, elevated LFTs, and elevated Cr concerning for HRS    Interval History:   - NAEO   - Worsening renal function with increasing creatinine: 4.24 (3/22) ? 3.63 (3/23) ? 3.16 (3/24) ? 2.91 (3/25) ? 2.87 (3/26) ? 2.28 (3/27) ? 2.54 (3/28) ? 2.86 (3/29), started terlipressin on 3/29 ? 2.68  (3/30) ? 3.05 (3/31) terlipressin dose increased on 3/31  - continued leukocytosis: 15.3 (3/22) ? 14.6 (3/23) ? 16.0 (3/24) ? 16.3 (3/25) ? 21.5 (3/26) ? 12.3 (3/27) ? 15.5 (3/28), now s/p 7 day ceftriaxone course ? 13 (3/29) ? 12.9 (3/30) ? 12.7 (3/31)    Physical Exam:    Vitals:    03/30/25 1606 03/30/25 2058 03/31/25 0032 03/31/25 0844   BP: 86/56 87/55 96/61 85/54   BP Location: Left arm  Left arm Left arm   Patient Position: Sitting   Sitting   Pulse: 69 73 73 78   Resp: 16 18 16 16   Temp: 36.4 °C (97.5 °F) 36.4 °C (97.5 °F) 36.3 °C (97.3 °F) 36.1 °C (97 °F)   TempSrc: Temporal  Temporal Temporal   SpO2: 97% 99% 99% 97%   Weight:       Height:             Intake/Output Summary (Last 24 hours) at 3/31/2025 0920  Last data filed at 3/31/2025 0844  Gross per 24 hour   Intake 350 ml   Output 211 ml   Net 139 ml       Gen: well appearing, no acute distress  HEENT: PEERL, EOMI, sclera anicteric, MMM  Resp: CTAB, normal breath sounds, normal work of breathing   CV: RRR, no JVD  GI: non-tender, distended, no rebound or guarding,   MSK: warm and well perfused, 1+ AZ  Neuro: AOx3, no focal deficits, no asterixis  Skin: warm and dry, no lesions, no rashe    Labs:  CBC  Results from last 7 days   Lab Units 03/31/25  0654 03/30/25  0642 03/29/25  0601 03/28/25  0625   HEMOGLOBIN g/dL 7.9* 8.2* 8.2* 9.1*   HEMATOCRIT % 23.0* 24.4* 22.4* 26.9*   WBC AUTO x10*3/uL  12.7* 12.9* 13.0* 15.5*   PLATELETS AUTO x10*3/uL 151 133* 142* 158      BMP  Results from last 7 days   Lab Units 03/31/25  0652 03/30/25  0643 03/29/25  0601 03/28/25  0625   SODIUM mmol/L 130* 133* 134* 137   POTASSIUM mmol/L 4.3 4.2 3.7 4.0   CHLORIDE mmol/L 96* 96* 97* 97*   CO2 mmol/L 18* 20* 22 24   BUN mg/dL 110* 99* 101* 97*   CREATININE mg/dL 3.05* 2.68* 2.86* 2.54*   ANION GAP mmol/L 20 21* 19 20   GLUCOSE mg/dL 211* 189* 85 76   CALCIUM mg/dL 9.7 9.6 9.2 9.9     LFTS  Results from last 7 days   Lab Units 03/31/25  0652 03/30/25  0643 03/29/25  0601   ALT U/L 33 34 31   AST U/L 64* 68* 69*   ALK PHOS U/L 100 102 90   BILIRUBIN TOTAL mg/dL 1.4* 1.4* 1.1     COAGS  Results from last 7 days   Lab Units 03/31/25  0652 03/30/25  0643 03/29/25  1022   INR  1.4* 1.6* 1.4*        Imaging:  === 03/25/25 ===    US GUIDED ABDOMINAL PARACENTESIS    - Impression -  Uneventful paracentesis, as detailed above. Right Hemiabdomen, 2600 mL    I personally performed and/or directly supervised this study and was  present for the entire procedure.    Performed and dictated at [facility].    Signed by: Ignacio Mitchell 3/26/2025 4:29 PM  Dictation workstation:   [id number]  === 02/25/15 ===    CT ABDOMEN PELVIS W CONTRAST    - Impression -  1. Interval increase in size of heterogeneous mass in the right  hepatic lobe, which now appears more heterogeneous and hypodense,  although some of this appearance may relate to differences in phase  of contrast, and a higher attenuating component raises concern for  possible hemorrhage within the mass, which could again represent a  hepatic adenoma, but malignant mass or abscess cannot be excluded and  further evaluation with liver MRI is again recommended.  Smaller  subcentimeter hypodensity in the right lobe as well as ill-defined  hypodensities in segment 4 are grossly unchanged.  These can also be  further evaluated with liver MRI.      Findings  discussed with Dr. Vera at 1:38 AM on 2/26/2015.  === 12/19/24 ===    MR LIVER WO AND W CONTRAST    Addendum 12/19/2024 11:21 AM -----------------------------------------------  Interpreted By:  David Navarrete,  ADDENDUM:  Increased size of portacaval and hepatic lymph nodes favoring  reactive nodes.    Signed by: David Navarrete 12/19/2024 11:21 AM    -------- ORIGINAL REPORT --------  Dictation workstation:   [id number]    - Impression -  1.  Findings compatible with cirrhosis, portal hypertension and liver  decompensation with a large amount of ascites.  2. No MRI evidence of HCC.  3. Measurable reduction of pre-existing probably benign liver lesions.      MACRO:  None    Signed by: David Navarrete 12/19/2024 10:50 AM  Dictation workstation:   [id number]    GI procedures    EGD 05/29/2025  mpression  The esophagus appeared normal.  Abnormal mucosa, consistent with gastritis in the antrum; performed cold forceps biopsy  The fundus of the stomach and body of the stomach appeared normal.  The duodenum appeared normal.  Findings  The esophagus appeared normal. Z-line is 38 cm from the incisors. No varices were seen  Erythematous mucosa with erosion in the antrum, consistent with gastritis; performed cold forceps biopsy to rule out H. pylori;  The fundus of the stomach and body of the stomach appeared normal.  The duodenum appeared normal.    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    Her recent hospital presentation (3/21) was notable for diffuse abdominal pain, leukocytosis, hyponatremia, elevated LFTs, worsening coagulopathy (INR 2.0), and DANIELLA (peak Cr 4.66), concerning for  possible hepatorenal syndrome vs. pre-renal etiology in the setting of infection. Imaging demonstrated cirrhosis with ascites and mild mesenteric edema. Although gallbladder ultrasound initially raised suspicion for acute cholecystitis (wall thickening and sludge), subsequent HIDA scan was normal (3/24). Given the clinical picture and leukocytosis, she was started empirically on IV ceftriaxone for suspected SBP vs. cholecystitis prior to transfer to [facility] on 3/25.    Since transfer, her renal function initially improved, with creatinine decreasing from 4.24 (3/22) to a america of 2.28 (3/27), but has since worsened, rising to 2.86 (3/29). Sodium has improved to 136. Leukocytosis has persisted, with an initial decrease after transfer but it subsequently increased to  now concerning for developing HRS. She is now s/p a 7-day course of IV ceftriaxone, with a current WBC of 13 (3/29). Coagulopathy remains unresolved. Diagnostic paracentesis was performed (3/26), revealing a SAAG of 3.0, negative for SBP, with cultures showing no growth to date.    On 3/29/2025, her renal function worsened, with creatinine peaking at 3.05 mg/dL. She underwent a 48-hour albumin challenge without improvement in renal function, prompting the initiation of terlipressin. After 48 hours of terlipressin use, her renal function did not improve, leading to a dose increase on 3/31/2025 in hopes of achieving renal recovery.     Plan:  #Cryptogenic Cirrhosis  #?Tin's Disease   #DANIELLA vs HRS   #Coagulopathy with elevated INR  - Continue Zinc gluconate, 50mg BID   - Trend CBC, BMP, hepatic function panel, coags  - S/p CTX, 2g daily for 7 days (last dose )  - Miralax BID and senna daily until first bowel movement, then Miralax BID thereafter.    - Can follow up with Dr. Vann at discharge   - Strict I&Os  - Increased terlipressin to 1.7mg every 6 hours for 3 days   > Please monitor for the followin) Telemetry   2) Continuous pulse oximetry   3)  Monitor for hypertension (can cause severe vasoconstriction)  4) Monitor respiratory status for hypoxia or signs of pulmonary edema   5) Monitor mental status   6) Serum creatinine and urine output   7) Monitor for signs of mesenteric ischemia (abdominal pain, diarrhea, nausea, vomiting, etc.)  8) Peripheral ischemia - bluing of the fingers and toes, extremity pain   9) Trend lactate  - Increase albumin to 25g BID  - small volume paracentesis   - Continue to trend MELD labs daily        Patient seen and discussed with attending, Dr. Reggie Sprague MD, PhD  [facility]   Division of Gastroenterology and Liver Disease       Thank you for the consultation. Gastroenterology will continue to the follow the patient.   Please do not hesitate to contact me on DocHalo or page [phone] if there are any further questions between the weekday hours of 7 AM - 5 PM.   If there is an urgent concern during the weekend, after-hours, or holidays; then please page the [place] GI fellow at [phone]. Thank you.    SIGNATURE: Ryley Sprague MD PATIENT NAME: Elizabeth Toledo   DATE: March 31, 2025 MRN: [id number]

## 2025-04-01 ENCOUNTER — APPOINTMENT (OUTPATIENT)
Dept: RADIOLOGY | Facility: HOSPITAL | Age: 50
DRG: 432 | End: 2025-04-01
Payer: MEDICARE

## 2025-04-01 VITALS
HEART RATE: 73 BPM | SYSTOLIC BLOOD PRESSURE: 94 MMHG | BODY MASS INDEX: 21.39 KG/M2 | RESPIRATION RATE: 17 BRPM | TEMPERATURE: 97.3 F | OXYGEN SATURATION: 98 % | HEIGHT: 56 IN | DIASTOLIC BLOOD PRESSURE: 60 MMHG | WEIGHT: 95.1 LBS

## 2025-04-01 LAB
ALBUMIN SERPL BCP-MCNC: 4.9 G/DL (ref 3.4–5)
ALP SERPL-CCNC: 98 U/L (ref 33–110)
ALT SERPL W P-5'-P-CCNC: 35 U/L (ref 7–45)
ANION GAP SERPL CALC-SCNC: 22 MMOL/L (ref 10–20)
AST SERPL W P-5'-P-CCNC: 76 U/L (ref 9–39)
BILIRUB SERPL-MCNC: 1.7 MG/DL (ref 0–1.2)
BUN SERPL-MCNC: 119 MG/DL (ref 6–23)
CALCIUM SERPL-MCNC: 9.7 MG/DL (ref 8.6–10.6)
CHLORIDE SERPL-SCNC: 93 MMOL/L (ref 98–107)
CO2 SERPL-SCNC: 19 MMOL/L (ref 21–32)
CREAT SERPL-MCNC: 3.4 MG/DL (ref 0.5–1.05)
EGFRCR SERPLBLD CKD-EPI 2021: 16 ML/MIN/1.73M*2
ERYTHROCYTE [DISTWIDTH] IN BLOOD BY AUTOMATED COUNT: 16.6 % (ref 11.5–14.5)
GLUCOSE SERPL-MCNC: 199 MG/DL (ref 74–99)
HCT VFR BLD AUTO: 23.9 % (ref 36–46)
HGB BLD-MCNC: 7.8 G/DL (ref 12–16)
INR PPP: 1.5 (ref 0.9–1.1)
MCH RBC QN AUTO: 35.5 PG (ref 26–34)
MCHC RBC AUTO-ENTMCNC: 32.6 G/DL (ref 32–36)
MCV RBC AUTO: 109 FL (ref 80–100)
NRBC BLD-RTO: 0 /100 WBCS (ref 0–0)
PLATELET # BLD AUTO: 139 X10*3/UL (ref 150–450)
POTASSIUM SERPL-SCNC: 4.7 MMOL/L (ref 3.5–5.3)
PROT SERPL-MCNC: 7.6 G/DL (ref 6.4–8.2)
PROTHROMBIN TIME: 16.4 SECONDS (ref 9.8–12.4)
RBC # BLD AUTO: 2.2 X10*6/UL (ref 4–5.2)
SODIUM SERPL-SCNC: 129 MMOL/L (ref 136–145)
WBC # BLD AUTO: 12.4 X10*3/UL (ref 4.4–11.3)

## 2025-04-01 PROCEDURE — 2500000001 HC RX 250 WO HCPCS SELF ADMINISTERED DRUGS (ALT 637 FOR MEDICARE OP)

## 2025-04-01 PROCEDURE — 99223 1ST HOSP IP/OBS HIGH 75: CPT

## 2025-04-01 PROCEDURE — 99497 ADVNCD CARE PLAN 30 MIN: CPT

## 2025-04-01 PROCEDURE — 99233 SBSQ HOSP IP/OBS HIGH 50: CPT | Performed by: STUDENT IN AN ORGANIZED HEALTH CARE EDUCATION/TRAINING PROGRAM

## 2025-04-01 PROCEDURE — 99498 ADVNCD CARE PLAN ADDL 30 MIN: CPT

## 2025-04-01 PROCEDURE — 85610 PROTHROMBIN TIME: CPT | Performed by: INTERNAL MEDICINE

## 2025-04-01 PROCEDURE — 36415 COLL VENOUS BLD VENIPUNCTURE: CPT | Performed by: INTERNAL MEDICINE

## 2025-04-01 PROCEDURE — 2500000004 HC RX 250 GENERAL PHARMACY W/ HCPCS (ALT 636 FOR OP/ED): Performed by: INTERNAL MEDICINE

## 2025-04-01 PROCEDURE — 85027 COMPLETE CBC AUTOMATED: CPT | Performed by: INTERNAL MEDICINE

## 2025-04-01 PROCEDURE — 80053 COMPREHEN METABOLIC PANEL: CPT | Performed by: INTERNAL MEDICINE

## 2025-04-01 PROCEDURE — 2500000001 HC RX 250 WO HCPCS SELF ADMINISTERED DRUGS (ALT 637 FOR MEDICARE OP): Performed by: INTERNAL MEDICINE

## 2025-04-01 PROCEDURE — P9047 ALBUMIN (HUMAN), 25%, 50ML: HCPCS | Performed by: INTERNAL MEDICINE

## 2025-04-01 PROCEDURE — 49083 ABD PARACENTESIS W/IMAGING: CPT

## 2025-04-01 PROCEDURE — 0W9G3ZZ DRAINAGE OF PERITONEAL CAVITY, PERCUTANEOUS APPROACH: ICD-10-PCS | Performed by: STUDENT IN AN ORGANIZED HEALTH CARE EDUCATION/TRAINING PROGRAM

## 2025-04-01 RX ORDER — AMOXICILLIN 250 MG
2 CAPSULE ORAL NIGHTLY
Qty: 60 TABLET | Refills: 0 | Status: SHIPPED | OUTPATIENT
Start: 2025-04-01 | End: 2025-05-01

## 2025-04-01 RX ORDER — POLYETHYLENE GLYCOL 3350 17 G/17G
17 POWDER, FOR SOLUTION ORAL DAILY
Qty: 30 EACH | Refills: 1 | Status: SHIPPED | OUTPATIENT
Start: 2025-04-02

## 2025-04-01 RX ADMIN — LEVOTHYROXINE SODIUM 25 MCG: 25 TABLET ORAL at 07:14

## 2025-04-01 RX ADMIN — ZINC SULFATE 220 MG (50 MG) CAPSULE 50 MG OF ELEMENTAL ZINC: CAPSULE at 09:22

## 2025-04-01 RX ADMIN — ALBUMIN HUMAN 25 G: 0.25 SOLUTION INTRAVENOUS at 09:23

## 2025-04-01 RX ADMIN — TERLIPRESSIN 1.7 MG: 0.85 INJECTION, POWDER, LYOPHILIZED, FOR SOLUTION INTRAVENOUS at 09:23

## 2025-04-01 RX ADMIN — FLUOXETINE HYDROCHLORIDE 60 MG: 20 CAPSULE ORAL at 07:14

## 2025-04-01 RX ADMIN — TERLIPRESSIN 1.7 MG: 0.85 INJECTION, POWDER, LYOPHILIZED, FOR SOLUTION INTRAVENOUS at 03:05

## 2025-04-01 RX ADMIN — ARIPIPRAZOLE 15 MG: 15 TABLET ORAL at 09:23

## 2025-04-01 ASSESSMENT — PAIN SCALES - GENERAL: PAINLEVEL_OUTOF10: 0 - NO PAIN

## 2025-04-01 NOTE — CONSULTS
"Inpatient consult to Palliative Care  Consult performed by: Arcelia Coughlin, APRN-CNP, DNP  Consult ordered by: Urbano Malave MD        Palliative Medicine Consult  Complex medical decision making, symptom management, patient/family support    History obtained from chart review including ED note, H&P, patient's daily progress notes, review of lab/test results, and discussion with primary team and bedside RN.    Subjective    History of Present Illness  Elizabeth Toledo is a 49y woman with a pmh Rogelio syndrome, developmental delay, MASH cirrhosis requiring frequent paracenteses, HLD, hypothyroidism. She presented with abdominal pain and was started on IV atbx for suspected SBP. Unfortunately continues to decline with DANIELLA and HRS.     Introduction to Palliative Medicine  Met with patient at bedside.     Patient remains altered, does not have capacity to make their own medical decisions at this time. Unable to participate in ROS or goals of care discussion. Surrogate decision maker is parents Smiley and Jennifer Toledo.     Palliative Medicine was introduced as a specialty service for patients with serious illness to help with symptom management, improve quality of life, assist with goals of care conversations, navigate complex decision making, and provide support to patients and families. Support and empathy was provided throughout the encounter. Provided reflective listening and presence.     Symptoms  Pain: denies  Dyspnea: denies  Nausea: denies  Appetite: declining  ROS limited due to pt's developmental age    Palliative Medicine Social History:  Patient loves soup and pasta. Loves her dog Biscuit. Loves the show everybody loves anil. She is very social and talkative.     Objective    Last Recorded Vitals  BP 94/55   Pulse 62   Temp 36.2 °C (97.2 °F)   Resp 17   Ht 1.422 m (4' 7.98\")   Wt (!) 43.1 kg (95 lb 1.6 oz)   SpO2 100%   BMI 21.33 kg/m²      Physical Exam  HENT:      Head: Normocephalic.      " Mouth/Throat:      Mouth: Mucous membranes are moist.   Pulmonary:      Effort: Pulmonary effort is normal.   Skin:     General: Skin is warm and dry.   Neurological:      Mental Status: She is alert. Mental status is at baseline.   Psychiatric:         Mood and Affect: Mood normal.          Relevant Results  Results for orders placed or performed during the hospital encounter of 03/25/25 (from the past 24 hours)   Protime-INR   Result Value Ref Range    Protime 16.4 (H) 9.8 - 12.4 seconds    INR 1.5 (H) 0.9 - 1.1   Comprehensive Metabolic Panel   Result Value Ref Range    Glucose 199 (H) 74 - 99 mg/dL    Sodium 129 (L) 136 - 145 mmol/L    Potassium 4.7 3.5 - 5.3 mmol/L    Chloride 93 (L) 98 - 107 mmol/L    Bicarbonate 19 (L) 21 - 32 mmol/L    Anion Gap 22 (H) 10 - 20 mmol/L    Urea Nitrogen 119 (HH) 6 - 23 mg/dL    Creatinine 3.40 (H) 0.50 - 1.05 mg/dL    eGFR 16 (L) >60 mL/min/1.73m*2    Calcium 9.7 8.6 - 10.6 mg/dL    Albumin 4.9 3.4 - 5.0 g/dL    Alkaline Phosphatase 98 33 - 110 U/L    Total Protein 7.6 6.4 - 8.2 g/dL    AST 76 (H) 9 - 39 U/L    Bilirubin, Total 1.7 (H) 0.0 - 1.2 mg/dL    ALT 35 7 - 45 U/L   CBC   Result Value Ref Range    WBC 12.4 (H) 4.4 - 11.3 x10*3/uL    nRBC 0.0 0.0 - 0.0 /100 WBCs    RBC 2.20 (L) 4.00 - 5.20 x10*6/uL    Hemoglobin 7.8 (L) 12.0 - 16.0 g/dL    Hematocrit 23.9 (L) 36.0 - 46.0 %     (H) 80 - 100 fL    MCH 35.5 (H) 26.0 - 34.0 pg    MCHC 32.6 32.0 - 36.0 g/dL    RDW 16.6 (H) 11.5 - 14.5 %    Platelets 139 (L) 150 - 450 x10*3/uL      XR abdomen 2 views supine and erect or decub  Narrative: Interpreted By:  Supa Henley and Ritchie Brandon   STUDY:  XR ABDOMEN 2 VIEWS SUPINE AND ERECT OR DECUB;  3/27/2025 8:11 pm      INDICATION:  Signs/Symptoms:Emesis consider Ileus or partial obs.      COMPARISON:  CT abdomen and pelvis 02/26/2015. MR liver 02/19/2024.      ACCESSION NUMBER(S):  [id number]      ORDERING CLINICIAN:  MELONY MARQUIS      FINDINGS:  Two AP views of  the abdomen were provided for review.      Nonobstructive bowel gas pattern. There is large colonic stool  burden. Limited evaluation of pneumoperitoneum, however no gross  evidence of free air is noted.      Visualized lungs are clear.      Osseous structures demonstrate no acute bony changes.      Impression: 1.  Large colonic stool burden with nonobstructive bowel-gas pattern.  No gross evidence of pneumoperitoneum.      I personally reviewed the images/study and I agree with the findings  as stated by resident Humberto Raman. This study was interpreted  at [place], [place].      MACRO:  None      Signed by: Supa Henley 3/28/2025 8:54 AM  Dictation workstation:   [id number]     Encounter Date: 03/21/25   ECG 12 lead   Result Value    Ventricular Rate 83    Atrial Rate 83    RI Interval 128    QRS Duration 95    QT Interval 412    QTC Calculation(Bazett) 485    P Axis 64    R Axis 16    T Axis 25    QRS Count 13    Q Onset 253    T Offset 459    QTC Fredericia 459    Narrative    Sinus rhythm  Abnormal R-wave progression, early transition  LVH with secondary repolarization abnormality  Baseline wander in lead(s) V1,V3,V5    See ED provider note for full interpretation and clinical correlation  Confirmed by Jaky Singh ([id number]) on 3/21/2025 1:44:46 PM        Allergies  Patient has no known allergies.    Scheduled medications  albumin human, 25 g, intravenous, BID  ARIPiprazole, 15 mg, oral, Daily  divalproex, 500 mg, oral, Nightly  FLUoxetine, 60 mg, oral, Daily  heparin (porcine), 5,000 Units, subcutaneous, q8h DEMETRIUS  levothyroxine, 25 mcg, oral, Daily  polyethylene glycol, 17 g, oral, Daily  sennosides-docusate sodium, 2 tablet, oral, Nightly  terlipressin, 1.7 mg, intravenous, q6h  zinc sulfate, 50 mg of elemental zinc, oral, BID      Continuous medications     PRN medications  PRN medications: ondansetron, sodium chloride     Assessment/Plan    Elizabeth  Tre is a 49y woman with a pmh Rogelio syndrome, developmental delay, MASH cirrhosis requiring frequent paracenteses, HLD, hypothyroidism. She presented with abdominal pain and was started on IV atbx for suspected SBP. Unfortunately continues to decline with DANIELLA and HRS.     ------------------------------------------------------------------------------------------------------------------------------------------------------------------------------------    Advanced Care Planning   family consented to a voluntary Advanced Care Planning meeting.   Serious Illness Assessment and Counseling:  Life Limiting Disease:   Liver disease, DANIELLA, HRS posing threat to life or function.     Disease Specific Information Provided/Prognosis Discussed: Patient's current clinical condition, including diagnosis, prognosis, and management plan were discussed.   Counseling provided on guarded prognosis and what to expect with disease progression of HRS. Discussed that without dialysis, renal function could continue to worsen over next days to weeks, causing pt to decompensate. Appetite declining, pt could become sleepier as organ dysfunction declines    Understanding/Overall Impression: PARENTS expressing clear understanding of overall health status and severity of illness.     Goals/Hopes: Discussion ensued about patient's goals for their medical care going forward. Allowed patient time to talk about his/her current quality of life, disease course/progression, and symptom and treatment burden. Discussed care plan to continue with aggressive hospital care despite symptom and treatment burden versus choosing to transition to comfort based plan of care that focuses on symptom management and quality of life. At this time, parents do not feel that interventions such as transplant or dialysis are aligned with pt's acceptable QOL. Offered a timed trial of dialysis, but parents feel that this would be too burdensome on pt's current QOL.  Decision make to pursue conservative care measures with the support of hospice at home.     Resuscitation Assessment: Counseling provided on the benefit versus burden of CPR in the setting of patient's overall health status and HRS .     Advanced Directives:  Counseling provided on the importance of not crisis planning as disease burden progresses but to establish treatment limitations now so in the future medical team will be clear on what patient feels is an acceptable quality of life for the patient and what treatment limitations' patient would like set into place based on that.       Next of Kin: parents Smiley and Jennifer    Code Status: Decision to change code status to DNR, DNI, NO ICU per parent wishes.     Hospice Discussion/Eligibility: Counseling provided on the benefit of Hospice Services in the setting of patient's HRS to keep patient out of the hospital while supporting patient and family by providing counseling, aggressive symptom management,  prioritizing comfort and quality of life, alleviation of suffering, and allowing patient to pass with comfort and dignity. Patient is hospice-eligible.   Patient/Family Impression: agreeable to hospice at home. They do not wish for patient to be readmitted to hospital when she declines.  All questions and concerns were addressed during encounter.     I spent 46 minutes in providing separately identifiable ACP services with the patient and/or surrogate decision maker in a voluntary conversation discussing the patient's wishes and goals as detailed in the above note.   ------------------------------------------------------------------------------------------------------------------------------------------------------------------------------------    #Goals of Care  - Code status: DNR, DNI, NO ICU  - Surrogate decision maker: mother Smiley [phone], father Jennifer  - Goals are comfort and quality of life based: consult placed to hospice services   - Plan for patient  to discharge home with hospice [facility]     #Psychosocial Support  - Spiritual Care Support  - child life specialist    Plan of Care discussed with: Updated Dr. Malave and bedside RN on goals of care decision, medication adjustments, and code status     Medical Decision Making was high level due to high complexity of problems, extensive data review, and high risk of management/treatment.       Thank you for allowing us to participate in the care of this patient. Palliative will continue to follow as needed. Palliative medicine is available Monday-Friday, 8a-6p. Please contact team with any questions or concerns.  Team pager [phone] (weekdays)  Arcelia Coughlin DNP, APRN-CNP

## 2025-04-01 NOTE — NURSING NOTE
Patient discharged to home this evening with plans to sign up with hospice at home. Her parents were at the bedside at discharge time. Her dad verbalized an understanding of the AVS instructions. Her IV was removed at discharge time. Her parents packed her belongings. Her prescriptions were sent to [facility] pharmacy. Her parents transported her home.

## 2025-04-01 NOTE — CARE PLAN
The patient's goals for the shift include Patient will remain HDS during the shift    The clinical goals for the shift include Patient's BP will remain above 80 mmHg systolic.

## 2025-04-01 NOTE — PROGRESS NOTES
"[facility]  [facility]  CONSULT FOLLOW-UP     Reason For Consult  Concerns for SBP, elevated LFTs, and elevated Cr concerning for HRS     SUBJECTIVE     No overnight events. Pt having BM per mother; it has been difficult to measure UOP as it will happen with her Bms.    Yesterday, team had a discussion with pt and her family members about her options if her cr did not improve. Her family did not wish to pursue transplant (for fears that family will not be able to take care of her after her parents pass) or HD. Today, we reiterated this, especially in setting of her worsening creatine today. At this time, family does not wish to pursue transplant or HD. Mom was asking if getting her discharged today was an option to allow her to be more comfortable.    EXAM     Last Recorded Vitals  Blood pressure 90/55, pulse 72, temperature 36.4 °C (97.5 °F), resp. rate 17, height 1.422 m (4' 7.98\"), weight (!) 43.1 kg (95 lb 1.6 oz), SpO2 100%.      Intake/Output Summary (Last 24 hours) at 4/1/2025 1028  Last data filed at 3/31/2025 1710  Gross per 24 hour   Intake 490 ml   Output 100 ml   Net 390 ml       Physical Exam   General: well-developed, well-nourished, no acute distress, AAOx3  HEENT: EOM intact, PERRL  CV: regular rate and rhythm, normal S1/S2, no murmur  Pulm: normal respiratory effort, clear to auscultation bilaterally with no wheezes, rales, rhonchi  Abd: soft, nontender, distended, no masses, normoactive bowel sounds  Extremities: warm, no LE edema  Neuro: moves all extremities equally, CN II - XII grossly intact  Skin: warm, dry, intact      OBJECTIVE                                                                              Medications             Current Facility-Administered Medications:     albumin human 25 % solution 25 g, 25 g, intravenous, BID, Marshall Boston MD, Last Rate: 50 mL/hr at 04/01/25 0923, 25 g at 04/01/25 0923    ARIPiprazole (Abilify) " tablet 15 mg, 15 mg, oral, Daily, Dylan Cardenas MD, 15 mg at 04/01/25 0923    divalproex (Depakote ER) 24 hr tablet 500 mg, 500 mg, oral, Nightly, Jael Stubbs DO, 500 mg at 03/31/25 2110    FLUoxetine (PROzac) capsule 60 mg, 60 mg, oral, Daily, Dylan Cardenas MD, 60 mg at 04/01/25 0714    heparin (porcine) injection 5,000 Units, 5,000 Units, subcutaneous, q8h DEMETRIUS, Dylan Cardenas MD, 5,000 Units at 03/30/25 0601    levothyroxine (Synthroid, Levoxyl) tablet 25 mcg, 25 mcg, oral, Daily, Marshall Boston MD, 25 mcg at 04/01/25 0714    ondansetron (Zofran) injection 4 mg, 4 mg, intravenous, q6h PRN, Marshall Boston MD, 4 mg at 03/29/25 2258    polyethylene glycol (Glycolax, Miralax) packet 17 g, 17 g, oral, Daily, Marshall Boston MD, 17 g at 03/29/25 0812    sennosides-docusate sodium (Yesica-Colace) 8.6-50 mg per tablet 2 tablet, 2 tablet, oral, Nightly, Marshall Boston MD    sodium chloride (Ocean) 0.65 % nasal spray 1 spray, 1 spray, Each Nostril, 4x daily PRN, Marshall Boston MD, 1 spray at 03/27/25 1440    terlipressin (Terlivaz) injection 1.7 mg, 1.7 mg, intravenous, q6h, Marshall Boston MD, 1.7 mg at 04/01/25 0923    zinc sulfate (Zincate) capsule 50 mg of elemental zinc, 50 mg of elemental zinc, oral, BID, Marshall Boston MD, 50 mg of elemental zinc at 04/01/25 0922                                                                            Labs     CBC RFP   Lab Results   Component Value Date    WBC 12.4 (H) 04/01/2025    HGB 7.8 (L) 04/01/2025    HCT 23.9 (L) 04/01/2025     (H) 04/01/2025     (L) 04/01/2025    NEUTROABS 13.21 (H) 03/25/2025    Lab Results   Component Value Date     (L) 04/01/2025    K 4.7 04/01/2025    CL 93 (L) 04/01/2025    CO2 19 (L) 04/01/2025     (HH) 04/01/2025    CREATININE 3.40 (H) 04/01/2025     Lab Results   Component Value Date    CALCIUM 9.7 04/01/2025    ALBUMIN 4.9 04/01/2025         Hepatic Function ABG/VBG   Lab  Results   Component Value Date    ALT 35 04/01/2025    AST 76 (H) 04/01/2025    ALKPHOS 98 04/01/2025     Lab Results   Component Value Date    BILITOT 1.7 (H) 04/01/2025    BILIDIR 1.2 (H) 03/22/2025     Lab Results   Component Value Date    PROTIME 16.4 (H) 04/01/2025    APTT 61 (H) 03/26/2025    INR 1.5 (H) 04/01/2025    Lab Results   Component Value Date    LACTATE 1.8 03/21/2025                                                                                  Imaging     === 03/25/25 ===     US GUIDED ABDOMINAL PARACENTESIS     - Impression -  Uneventful paracentesis, as detailed above. Right Hemiabdomen, 2600 mL     I personally performed and/or directly supervised this study and was  present for the entire procedure.     Performed and dictated at [facility].     Signed by: Ignacio Mitchell 3/26/2025 4:29 PM  Dictation workstation:   [id number]  === 02/25/15 ===     CT ABDOMEN PELVIS W CONTRAST     - Impression -  1. Interval increase in size of heterogeneous mass in the right  hepatic lobe, which now appears more heterogeneous and hypodense,  although some of this appearance may relate to differences in phase  of contrast, and a higher attenuating component raises concern for  possible hemorrhage within the mass, which could again represent a  hepatic adenoma, but malignant mass or abscess cannot be excluded and  further evaluation with liver MRI is again recommended.  Smaller  subcentimeter hypodensity in the right lobe as well as ill-defined  hypodensities in segment 4 are grossly unchanged.  These can also be  further evaluated with liver MRI.        Findings discussed with Dr. Vera at 1:38 AM on 2/26/2015.  === 12/19/24 ===     MR LIVER WO AND W CONTRAST     Addendum 12/19/2024 11:21 AM -----------------------------------------------  Interpreted By:  David Navarrete,  ADDENDUM:  Increased size of portacaval and hepatic lymph nodes favoring  reactive nodes.      Signed by: David Navarrete 12/19/2024 11:21 AM     -------- ORIGINAL REPORT --------  Dictation workstation:   [id number]     - Impression -  1.  Findings compatible with cirrhosis, portal hypertension and liver  decompensation with a large amount of ascites.  2. No MRI evidence of HCC.  3. Measurable reduction of pre-existing probably benign liver lesions.                                                                               GI Procedures     EGD 05/29/2025  mpression  The esophagus appeared normal.  Abnormal mucosa, consistent with gastritis in the antrum; performed cold forceps biopsy  The fundus of the stomach and body of the stomach appeared normal.  The duodenum appeared normal.  Findings  The esophagus appeared normal. Z-line is 38 cm from the incisors. No varices were seen  Erythematous mucosa with erosion in the antrum, consistent with gastritis; performed cold forceps biopsy to rule out H. pylori;  The fundus of the stomach and body of the stomach appeared normal.  The duodenum appeared normal.    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    Her recent hospital presentation (3/21) was notable for diffuse abdominal pain, leukocytosis, hyponatremia, elevated LFTs, worsening coagulopathy (INR 2.0), and DANIELLA (peak Cr 4.66), concerning for possible hepatorenal syndrome vs. pre-renal etiology in the setting of infection. Imaging demonstrated cirrhosis with ascites and mild mesenteric edema. Although gallbladder ultrasound initially raised suspicion for acute cholecystitis (wall thickening and sludge), subsequent HIDA scan was normal (3/24). Given the clinical picture and  leukocytosis, she was started empirically on IV ceftriaxone for suspected SBP vs. cholecystitis prior to transfer to [facility] on 3/25.    Since transfer, her renal function initially improved, with creatinine decreasing from 4.24 (3/22) to a america of 2.28 (3/27), but has since worsened, rising to 2.86 (3/29). Sodium has improved to 136. Leukocytosis has persisted, with an initial decrease after transfer but it subsequently increased to  now concerning for developing HRS. She is now s/p a 7-day course of IV ceftriaxone, with a current WBC of 13 (3/29). Coagulopathy remains unresolved. Diagnostic paracentesis was performed (3/26), revealing a SAAG of 3.0, negative for SBP, with cultures showing no growth to date.     On 3/29/2025, her renal function worsened, with creatinine peaking at 3.05 mg/dL. She underwent a 48-hour albumin challenge without improvement in renal function, prompting the initiation of terlipressin. After 48 hours of terlipressin use, her renal function did not improve, leading to a dose increase on 3/31/2025 in hopes of achieving renal recovery.    Renal function has not improved despite increased terlipressin dose. We reiterated the conversation of her options (transplant or HD). At this time, family does not wish to pursue transplant or HD.      #Cryptogenic Cirrhosis  #?Tin's Disease   #DANIELLA vs HRS   #Coagulopathy with elevated INR    Recommendations:  -Consider palliative care consult   -Recommend therapeutic paracentesis today  -Continue terlipressin to 1.7mg every 6 hours for total of 4 days on increased dose. Although at this time, it is unlikely that this will improve her renal function (as we would have seen the effects by now). If pt and her family wishes to be discharged before the completion of the course, it would not be unreasonable to do so to maximize her comfort.  -Continue albumin 25g bid  -Continue to trend MELD labs daily   -Continue Zinc gluconate, 50mg BID     Patient was seen  and discussed with Dr. Ascencio .    Thank you for the consultation. Hepatology will continue to follow.  During weekday hours of 7am-5pm, please do not hesitate to contact me on [employer] or page [phone], if there are any further questions.  After hours, on weekends, and on holidays, please page the on-call GI fellow at [phone].   Thank you.

## 2025-04-01 NOTE — PROGRESS NOTES
Met with pt and her mom Smiley with Arcelia Coughlin DNP and Rev. Katerin Dominguez from [facility] care team.  Pt shared some of her loves like her dog Biscuit, watching TV shows like Everyone Loves Robert, and eating pasta and soup.  We will return to have further discussions when pt's dad arrives later this afternoon.    1445 Met with pt and parents with Arcelia Coughlin DNP and Rev. Katerin Dominguez from [facility] care.  Explained role of palliative care in the hospital to parents.  Answered parents questions about dialysis to determine if this was something they might consider.  Family is not interested in pursuing this at this time.  Family would like to prevent pt from having to come to the hospital and are agreeable to meet with hospice.  Educated family on the role of hospice and all the services they would provide.  Family would like to take pt home tonight if possible, and SW can arrange for hospice to meet with them at home.  Family in agreement.  rEferral placed to [facility].      KRISTOPHER Hubbard

## 2025-04-01 NOTE — PROGRESS NOTES
Spiritual Care Visit  Spiritual Care Request    Reason for Visit:  Routine Visit: Introduction  Continue Visiting: Yes     Focus of Care:  Visited With: Patient and family together      Met with patient and her parents to day to discuss medical condition, patient's life outside of hospital (dog Biscuit, TV and soup/pasta) and discharge planning. Provided non-anxious, supportive presence, reflective listening, affirmation and normalization of experience. Will continue to follow.

## 2025-04-01 NOTE — CARE PLAN
The patient's goals for the shift include Patient will remain HDS during the shift    The clinical goals for the shift include pt to remain hds stable    Problem: Pain - Adult  Goal: Verbalizes/displays adequate comfort level or baseline comfort level  Outcome: Progressing     Problem: Safety - Adult  Goal: Free from fall injury  Outcome: Progressing     Problem: Discharge Planning  Goal: Discharge to home or other facility with appropriate resources  Outcome: Progressing     Problem: Chronic Conditions and Co-morbidities  Goal: Patient's chronic conditions and co-morbidity symptoms are monitored and maintained or improved  Outcome: Progressing     Problem: Nutrition  Goal: Nutrient intake appropriate for maintaining nutritional needs  Outcome: Progressing     Problem: Skin  Goal: Decreased wound size/increased tissue granulation at next dressing change  Outcome: Progressing  Goal: Participates in plan/prevention/treatment measures  Outcome: Progressing  Goal: Prevent/manage excess moisture  Outcome: Progressing  Goal: Prevent/minimize sheer/friction injuries  Outcome: Progressing  Goal: Promote/optimize nutrition  Outcome: Progressing  Goal: Promote skin healing  Outcome: Progressing

## 2025-04-01 NOTE — PROGRESS NOTES
4/1/25 3600 Transitional Care Coordinator Notes:     Transitional Care Coordination Progress Note:  Patient discussed during interdisciplinary rounds.   Team members present: MD and TCC  Plan per Medical/Surgical team: Patient renal function continues to worsen. Hepatology team met with patient and family to discuss transplant and hemodialysis. Parents are declining both. Team to place a Palliative Care Consult in. Will continue to follow for discharge needs.  Payor: Medicare  Discharge disposition: home  Potential Barriers: none  ADOD: 1-3 days                     Assessment/Plan   Assessment & Plan  Cirrhosis (Multi)               Debra Tsang RN

## 2025-04-01 NOTE — PROGRESS NOTES
04/01/25 145   Discharge Planning   Home or Post Acute Services In home services   Expected Discharge Disposition [facility]   Does the patient need discharge transport arranged? No       Patient will discharge to home today. She will sign up with hospice at home. No other discharge needs stated. Parents will provide transportation home.      Debra Tsang RN, BSN  Transitional Care Coordinator

## 2025-04-01 NOTE — POST-PROCEDURE NOTE
INTERVENTIONAL RADIOLOGY ADVANCED PRACTICE PROCEDURE  [facility]    A time out was performed and Right Hemiabdomen was examined with US and appropriate entry point was confirmed and marked.   The patient was prepped and draped in a sterile manner, 1% lidocaine was used to anesthesize the skin and subcutaneous tissue.   A 5F Centesis needle was then introduced through the skin into the peritoneal space, the centesis catheter was then threaded without difficulty.   3300 ml of yellow fluid was removed without difficulty. The catheter was then removed.   No immediate complications were noted during and immediately following the procedure.

## 2025-04-09 ENCOUNTER — HOSPITAL ENCOUNTER (OUTPATIENT)
Dept: RADIOLOGY | Facility: HOSPITAL | Age: 50
Discharge: HOME | End: 2025-04-09
Payer: MEDICARE

## 2025-04-09 ENCOUNTER — APPOINTMENT (OUTPATIENT)
Dept: NEPHROLOGY | Facility: CLINIC | Age: 50
End: 2025-04-09
Payer: MEDICARE

## 2025-04-09 DIAGNOSIS — R18.8 OTHER ASCITES: ICD-10-CM

## 2025-04-09 PROCEDURE — 49083 ABD PARACENTESIS W/IMAGING: CPT

## 2025-04-09 PROCEDURE — 87070 CULTURE OTHR SPECIMN AEROBIC: CPT | Mod: PORLAB | Performed by: RADIOLOGY

## 2025-04-09 RX ORDER — ALBUMIN HUMAN 250 G/1000ML
25 SOLUTION INTRAVENOUS ONCE
Status: DISCONTINUED | OUTPATIENT
Start: 2025-04-09 | End: 2025-04-10 | Stop reason: HOSPADM

## 2025-04-12 LAB
BACTERIA FLD CULT: NORMAL
GRAM STN SPEC: NORMAL
GRAM STN SPEC: NORMAL

## 2025-04-16 ENCOUNTER — HOSPITAL ENCOUNTER (OUTPATIENT)
Dept: RADIOLOGY | Facility: HOSPITAL | Age: 50
Discharge: HOME | End: 2025-04-16
Payer: MEDICARE

## 2025-04-16 ENCOUNTER — OFFICE VISIT (OUTPATIENT)
Dept: GASTROENTEROLOGY | Facility: CLINIC | Age: 50
End: 2025-04-16
Payer: MEDICARE

## 2025-04-16 VITALS
HEIGHT: 55 IN | TEMPERATURE: 95.4 F | BODY MASS INDEX: 18.52 KG/M2 | WEIGHT: 80 LBS | HEART RATE: 80 BPM | DIASTOLIC BLOOD PRESSURE: 55 MMHG | SYSTOLIC BLOOD PRESSURE: 89 MMHG

## 2025-04-16 DIAGNOSIS — I85.10 ESOPHAGEAL VARICES IN CIRRHOSIS (MULTI): ICD-10-CM

## 2025-04-16 DIAGNOSIS — R18.8 OTHER ASCITES: ICD-10-CM

## 2025-04-16 DIAGNOSIS — K74.60 ESOPHAGEAL VARICES IN CIRRHOSIS (MULTI): ICD-10-CM

## 2025-04-16 DIAGNOSIS — K74.60 HEPATIC CIRRHOSIS, UNSPECIFIED HEPATIC CIRRHOSIS TYPE, UNSPECIFIED WHETHER ASCITES PRESENT (MULTI): ICD-10-CM

## 2025-04-16 PROCEDURE — 3008F BODY MASS INDEX DOCD: CPT | Performed by: INTERNAL MEDICINE

## 2025-04-16 PROCEDURE — 2720000007 HC OR 272 NO HCPCS

## 2025-04-16 PROCEDURE — 99214 OFFICE O/P EST MOD 30 MIN: CPT | Performed by: INTERNAL MEDICINE

## 2025-04-16 PROCEDURE — 1036F TOBACCO NON-USER: CPT | Performed by: INTERNAL MEDICINE

## 2025-04-16 PROCEDURE — 49083 ABD PARACENTESIS W/IMAGING: CPT

## 2025-04-16 ASSESSMENT — PAIN SCALES - GENERAL: PAINLEVEL_OUTOF10: 0-NO PAIN

## 2025-04-16 NOTE — PATIENT INSTRUCTIONS
Keep hydrated.    Keep getting ascites fluid drained.    If you want to talk about having a catheter tube placed into the fluid for drainage at home, please let us know.

## 2025-04-16 NOTE — PROGRESS NOTES
"HEPATOLOGY RETURN PATIENT VISIT    April 16, 2025    Dr. Gretchen Hwang       Patient Name:   ELIZABETH JOHNSTON  Medical Record Number: [id number]  YOB: 1975    Dear Dr. Hwang,    I had the pleasure of seeing Elizabeth Johnston (along with her parents) for follow-up evaluation in the [facility] ([facility] office). History and physical examination was performed. Pertinent available laboratory, imaging, pathology results were reviewed.  I spent over 20 minutes reviewing her records in preparation for that visit.    Most of history is obtained from her parents because the patient has developmental delay.    History of Present Illness:   The patient is a 49 year old white female who is referred for follow-up evaluation of elevated liver function tests, hepatic adenomas, cirrhosis, ascites and hepatorenal syndrome.    The patient has had elevated LFTs for many years.  She was then referred to me for this problem several years later.    She had been admitted to the hospital in February 2015 with abdominal pain.  Imaging studies suggested a hepatic adenoma with some subcapsular hemorrhage.  There was no evidence of hemodynamic instability.  She was seen by the Chief of Hepatobiliary Surgery.  The recommendation was just to follow this with an intermittent imaging studies.  She never required surgery or ablation.    Even though her LFTs had been elevated for many years, I did not see a complete work-up done in the past other than some basic hepatitis serologies.  In addition, I did not see any imaging studies in many years.    In addition to her liver disease, she does have psychiatric issues and is on multiple psychiatric medications as well as valproic acid and a statin.  She has been on her psychiatric medications for many years.  Her mother describes them as a \"godsend” and reports that they are not sure what they would do if they had to be stopped.    They were under the impression " "that she did not need to get additional imaging studies of the liver.  She has not had an ultrasound since July 2017.  She has been off of birth control pills since 2015.    The patient denied any past history of acute hepatitis or jaundice.      She has never had any jaundice, hepatic encephalopathy, or variceal bleeding. She denied ever having a liver biopsy.    She initially saw me in consultation on 5/24/2023.  At that time, I did additional evaluation (see results below).  After that, she had a FibroScan test that was very technically difficult and not likely reliable and/or accurate.  For that reason, I had her undergo a liver biopsy.  This was done without complications on 8/2/2023.  This did confirm cirrhosis, but did not give an absolute etiology.    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  Otherwise, we could not prove Tin's disease.  She fell into the \"problematic” category of diagnosing her based on the latest guidelines.  For that reason, I started her on zinc gluconate on the off chance she could have Tin disease.  We also recommended that she work on JACKSON risk factors.    The patient began to have increased abdominal girth in late 2024.  Imaging study showed cirrhosis.  We had her undergo a paracentesis.  The fluid looked like portal hypertensive ascites which we felt was due to worsening cirrhosis.  We started her on Lasix 40 mg daily and Aldactone 100 mg daily on 12/26/2024.  She continued to have ascites requiring more paracenteses.  Because of that, we increased her diuretics to Lasix 80 mg daily and Aldactone 200 mg daily on 1/16/2025.  Due to electrolyte abnormalities, we decreased her diuretics to Lasix 60 mg daily and Aldactone 50 mg daily on 1/24/2025.    She has continued to get frequent paracenteses.  She underwent a 5.6 L paracentesis on 12/26/2024.  She underwent a 6.2 L paracentesis on 1/24/2025.  She underwent a 4.3 L paracentesis on " 3/17/2025.    After that, the patient began to have worsening renal function and eventual renal failure.  Her parents opted not to pursue liver transplant options.  She was actually hospitalized in late March 2025 with worsening renal function.  She was tried on Terlipressin without significant improvement in her renal function.  She underwent a 2.6 L paracentesis on 3/26/2025.  She underwent a 3.3 L paracentesis on 4/1/2025.  Her parents eventually opted to pursue palliative care and she was discharged home on 4/1/2025 with a plan for more of palliative care/hospice and paracentesis as needed for comfort.  She underwent a 3.4 L paracentesis on 4/9/2025.  She underwent a 2.2 L paracentesis on 4/16/2025.    She presented today for follow-up evaluation.  She is no longer on diuretics.  They are trying to have her follow a low-sodium diet.  However, she continues to build up ascites and edema.  She is getting paracentesis about every week.  She is tolerating the zinc gluconate 50 mg once daily well.  Her parents admit that she is getting very frail and much weaker.  She is having trouble ambulating.  She has no interest in eating food and at this point is only drinking clear liquids.  She denied any pain.  She is officially enrolled in hospice.    Past Medical/Surgical History:   ? Hyperlipidemia (272.4) (E78.5)  ? Added by Problem List Migration; 2013-6-4; Moved to [facility] Nov 27 2013 9:11PM  ? Hepatic adenoma (211.5) (D13.4)  ? Hypothyroidism (244.9) (E03.9)  ? Elevated platelet count (790.6) (R79.89)  ? Mild vitamin D deficiency (268.9) (E55.9)  ? 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 Tremor (781.0) (R25.1)  ? Overweight with body mass index (BMI) of 27 to 27.9 in adult (278.02,V85.23)  (E66.3,Z68.27)  ? Mood swings (799.24) (R45.86)  ? Benign neoplasm of liver (211.5) (D13.4)  ? Colon cancer screening (V76.51) (Z12.11)  ? Elevated platelet count (790.6) (R79.89)  ? Encounter for immunization (V03.89) (Z23)  ?  Hepatic adenoma (211.5) (D13.4)  ? Hyperlipidemia (272.4) (E78.5)  ? Added by Problem List Migration; 2013-6-4; Moved to Suppressed Nov 27 2013 9:11PM  ? Hypothyroidism (244.9) (E03.9)  ? Lymphedema (457.1) (I89.0)  ? Medication-induced postural tremor (333.1,E980.5) (G25.1)  ? Mild vitamin D deficiency (268.9) (E55.9)  ? Mood disorder (296.90) (F39)  ? Mood swings (799.24) (R45.86)  ? Skin lesion (709.9) (L98.9)  ? Tremor (781.0) (R25.1)  ? 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 History of abnormal mammogram (V15.89) (Z87.898)  ? History of edema (V13.89) (Z87.898)  ? Resolved Date: 31 Jan 2023  ? Added by Problem List Migration; 2013-8-27; Moved to Suppressed Nov 27 2013 9:11PM  COVID    Family History:   Mother  ? Family history of diabetes mellitus (V18.0) (Z83.3)  ? Family history of hyperlipidemia (V18.19) (Z83.438)  ? Family history of hypertension (V17.49) (Z82.49)  Father  ? Family history of hyperlipidemia (V18.19) (Z83.438)  There is no known family history of colon cancer.  There is no known family history of liver disease.    Social History:   She denied tobacco use.  She denied alcohol use.  She denied tattoos.  She denied intravenous drug use.  She denied blood transfusions.  She is attending a daily adult  center and is quite happy and is making friends.    Review of systems: As noted above. She has ongoing ascites but not much edema.  She has the weakness.  She has nausea and vomiting with solid foods but is taking clear liquids.  No fever, chills, weight loss, visual changes, auditory changes, shortness of breath, chest pain, abdominal pain, GI bleeding, diarrhea, constipation, depression, dysuria, hematuria, musculoskeletal issues, or rash.       Medical, Surgical, Family, and Social Histories  Medical History[1]    Surgical History[2]    Family History[3]    Social History     Socioeconomic History    Marital status: Single     Spouse name: Not on file    Number of children: Not on file     "Years of education: Not on file    Highest education level: Not on file   Occupational History    Not on file   Tobacco Use    Smoking status: Never     Passive exposure: Never    Smokeless tobacco: Never   Vaping Use    Vaping status: Never Used   Substance and Sexual Activity    Alcohol use: Never    Drug use: Never    Sexual activity: Not on file   Other Topics Concern    Not on file   Social History Narrative    Not on file     Social Drivers of Health     Financial Resource Strain: Low Risk  (3/27/2025)    Overall Financial Resource Strain (CARDIA)     Difficulty of Paying Living Expenses: Not hard at all   Food Insecurity: No Food Insecurity (3/25/2025)    Hunger Vital Sign     Worried About Running Out of Food in the Last Year: Never true     Ran Out of Food in the Last Year: Never true   Transportation Needs: No Transportation Needs (3/27/2025)    PRAPARE - Transportation     Lack of Transportation (Medical): No     Lack of Transportation (Non-Medical): No   Physical Activity: Inactive (3/25/2025)    Exercise Vital Sign     Days of Exercise per Week: 0 days     Minutes of Exercise per Session: 0 min   Stress: Not on File (5/20/2021)    Received from [employer]    Stress     Stress: 0   Social Connections: Not on File (9/17/2024)    Received from [employer]    Social Connections     Connectedness: 0   Intimate Partner Violence: Not At Risk (3/25/2025)    Humiliation, Afraid, Rape, and Kick questionnaire     Fear of Current or Ex-Partner: No     Emotionally Abused: No     Physically Abused: No     Sexually Abused: No   Housing Stability: Low Risk  (3/27/2025)    Housing Stability Vital Sign     Unable to Pay for Housing in the Last Year: No     Number of Times Moved in the Last Year: 0     Homeless in the Last Year: No       Allergies and Current Medications  Allergies[4]  Current Medications[5]     Physical Exam  BP 89/55   Pulse 80   Temp 35.2 °C (95.4 °F) (Temporal)   Ht 1.397 m (4' 7\")   Wt (!) 36.3 kg (80 lb)  "  BMI 18.59 kg/m²       Physical Examination:   General Appearance: alert and in no acute distress.  She is quite short in stature.  She appears much more frail.  HEENT: oropharynx without lesions. Anicteric sclerae.  She has bitemporal wasting.  Neck supple, nontender, without adenopathy, thyromegaly, or JVD.   Lungs clear to auscultation and percussion.   Heart RRR without murmurs, rubs, or gallops.   Abdomen: Soft, nontender, bowel sounds positive, with mild ascites.  I cannot appreciate hepatosplenomegaly due to the ascites.  Extremities full ROM, no atrophy, normal strength.  No edema currently.  Skin no specific lesions.   Neurological exam reveals that she is alert and awake.  She does have a tremor bilaterally.  No obvious asterixis.  No spider angiomata, but she does have bilateral palmar erythema.     Labs 4/1/2025 WBC 12.4, hemoglobin 7.8, , platelets 139, INR 1.5, glucose 199, sodium 129, potassium 4.7, , creatinine creatinine 3.4, protein 7.6, albumin 4.9, alk phos 98, bilirubin 1.7, AST 76, ALT 35.    Ascites 3/26/2025 albumin 0.8, culture negative, WBC 39.    Labs 3/20/2025 AFP 2.2.    Labs 1/29/2025 glucose 97, sodium 135, potassium 4.5, creatinine 1.18.    Labs 1/23/2025 glucose 83, sodium 138, potassium 5.7, creatinine 1.22.    Labs 1/15/2025 creatinine 0.93, glucose 89, sodium 138, TSH 3.64.    Ascites 12/26/2024 albumin 1.1, protein 1.8, WBC 61, cytology negative for malignancy, culture negative.    Labs 12/16/2024 glucose 89, sodium 144, creatinine 0.58, AFP < 4, alkaline phosphatase isoenzymes show significant elevation in the liver fraction with mild elevation in the bone fraction, cholesterol 213, , triglycerides 157, hepatitis B surface antibody positive, protein 8, albumin 3.8, alk phos 289, bilirubin 1.4, direct bilirubin 0.3, AST 84, ALT 43, INR 1.2, WBC 7.7, hemoglobin 14.2, , platelets 348.    Labs 8/7/2024 hemoglobin A1c 5.4%, INR 1.2, WBC 7.4, hemoglobin  12.9, platelets 271, protein 8, albumin 4.3, alk phos 112, bilirubin 1, AST 93, ALT 54, glucose 88, sodium 143, creatinine 0.65, AFP < 4.    Labs 2/26/2024 cholesterol 195, , triglycerides 136.    Labs 1/17/2024 INR 1.1, AFP < 4, WBC 8.8, hemoglobin 13.3, platelets 333, protein 7.9, albumin 4.6, alk phos 60, bilirubin 0.6, , ALT 63, glucose 95, sodium 141, creatinine 0.69.    Labs 12/16/2023 COVID PCR positive.    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    Labs 9/7/2023 ceruloplasmin 31.    Labs 7/26/2023 WBC 8.1, hemoglobin 13.4, platelets 342, glucose 101, sodium 143, creatinine 0.72, protein 7.7, albumin 4.5, alk phos 50, bilirubin 0.7, , ALT 74, INR 1.1.    Labs 6/8/2023 AFP < 4, HAV +, HBsAg -, HBsAb -, HCV -, ferritin 108, iron sat 15%, PAKO -, AMA -, SMA 40, A1AT phenotype MM.    Labs 2/3/2023 TSH 2.08, vitamin D was 26, glucose 77, sodium 142, creatinine 0.68, protein 7.8, albumin 4.8, alk phos 46, bilirubin 0.6, , ALT 82, cholesterol 192, , HDL 59, triglycerides 133, WBC 8.5, hemoglobin 13.6, platelets 390.    Labs 3/9/2022 AST 54, ALT 40.    Labs 1/26/2021 AST 70, ALT 61.    Labs 7/20/2018 alk phos 31, AST 40, ALT 37.    Labs 3/14/2015 ALT 78.    Labs 2/27/2015 ferritin 201, iron saturation 6%, hepatitis B surface antigen negative, hepatitis C antibody negative, AFP < 1.    Labs 2/25/2015 .    Labs 1/19/2015 ALT 44.    Labs 1/18/2007 alk phos 39, bilirubin 2.1, AST 43, ALT 28.    HIDA scan 3/24/2025:  IMPRESSION:  1. Patency of cystic duct and common bile duct without evidence of  acute cholecystitis.    Ultrasound 3/21/2025:  IMPRESSION:  Hepatomegaly with cirrhotic morphology liver. Associated moderate  volume ascites.      Gallbladder wall thickening with gallbladder sludge and a positive  sonographic Shannon sign reported by sonographer. Constellation of  findings warrants clinical exclusion of  superimposed cholecystitis.  Consider gallbladder nuclear medicine scintigraphy for further  assessment.    CAT scan without contrast 3/18/2025:  Impression    1.  Mild morphologic features of cirrhosis. Small focal hypodensity right hepatic lobe associated with capsular retraction not well evaluated on the current study. Consider follow-up with nonemergent MRI when patient is able to tolerate.  2.  Questionable wall thickening involving portions of stomach and ascending colon/transverse colon, nonspecific in the setting of ascites.  3.  Moderate amount of ascites in the abdomen and pelvis. Mesenteric edema.  4.  Appendix is not well delineated or evaluated and appears to be obscured by ascites.  5.  Cholelithiasis/sludge.      MRI 12/19/2024:  IMPRESSION:  1.  Findings compatible with cirrhosis, portal hypertension and liver  decompensation with a large amount of ascites.  2. No MRI evidence of HCC.  3. Measurable reduction of pre-existing probably benign liver lesions.      Ultrasound 12/17/2024:  IMPRESSION:  1.  Hepatomegaly with findings of hepatic steatosis.  2. Moderate amount of ascites.  3. Stable appearance of echogenic lesion in the right hepatic lobe  with sonographic features suggesting hemangioma. If further  evaluation is clinically warranted, liver MRI may be obtained.  4. Gallbladder sludge.      US 7/31/2024:  IMPRESSION:  Hepatomegaly and right hepatic lobe echogenic subcentimeter lesions,  likely represent adenomas, seen on the MRI of the liver from 2016.      Ultrasound 1/10/2024:  IMPRESSION:  Mild hepatomegaly.      Redemonstration of scattered small hypoechoic lesions, nonspecific,  however statistically likely representing hemangiomas. Hepatic mass  protocol MRI may be obtained for further assessment.      US 6/7/2023:  IMPRESSION:  Hepatomegaly.  Multiple small echogenic liver lesions favored to represent  hemangiomas or adenomas but incompletely characterized sonographically    Ultrasound  7/6/2017:  IMPRESSION:  Multiple scattered nonshadowing echogenic liver lesions as described.  The largest lesion is slightly larger today. Otherwise, these are  grossly stable.  Remainder of the exam was negative.    MRI 2/19/2016:  IMPRESSION:  1. Interval decrease in size of segment 7 lesion now measuring 1.9 x  2.1 x 2.3 cm versus 2.1 x 2.4 x 2.3 cm on prior exam, likely adenoma  complicated by prior hemorrhage.  2. Stable segment 7 probable hemangioma.  3. Stable redemonstration of hepatic segment 4A and 6 sub cm lesions  which, while evaluation is limited due to motion degradation of early  postcontrast images, may represent slow filling hemangiomas or  adenomas.  4. No evidence of new hepatic lesions.    CAT scan with contrast 1/19/2015:  Impression:  1. Limited examination due to motion artifact.  2. No obvious acute abdominal or pelvic process.  3. Heterogeneous mass in the right lobe of the liver measuring 4.1 x  4.9 cm. This could represent a hepatic adenoma or FNH, however,  malignant hepatic masses are not excluded. Further evaluation with  liver MRI is recommended. This may be performed on a nonemergent  basis.  4. Hypodensity in the pancreatic body likely represents prominent fat  between pancreatic lobules. However, the appearance is somewhat  unusual due to motion artifact. Attention to this area on followup is  recommended.    EGD 5/29/2024:  Impression  The esophagus appeared normal.  Abnormal mucosa, consistent with gastritis in the antrum; performed cold forceps biopsy  The fundus of the stomach and body of the stomach appeared normal.  The duodenum appeared normal.      Liver biopsy 8/2/2023:  FINAL DIAGNOSIS  A.  RANDOM LIVER BIOPSY:    -- LIVER PARENCHYMA WITH CIRRHOSIS    Note: The liver tissue is nodular with bridging and circumferential fibrosis.  The portal tracts are mildly expanded with mostly lymphocytes.  No significant  bile duct damage or interface hepatitis is seen.  The  hepatocytes demonstrate  Mirella Denk bodies and feathery degeneration with rare acidophil bodies.  No  cholestasis is seen.      Special stains:  Iron               negative  PAS with digestion     no cytoplasmic eosinophilic globules  Trichrome          bridging fibrosis and circumferential fibrosis  Reticulin          no collapsed liver parenchyma  Copper               negative       FibroScan 7/12/2023 revealed a median liver stiffness of 33.7 kPa with IQR 33% and .        Assessment/Plan:     Elevated liver function tests  History of hepatic adenoma  Cirrhosis  Ascites  Hepatorenal syndrome    The patient is referred to me for follow-up evaluation of the above issues.    As noted above, the patient has had elevated LFTs for many years.  Other than imaging studies and hepatitis serologies, I see no particular work-up having been done for this in the past.  For that reason, I did a full serologic work-up.  Her serologic work-up for other causes of liver disease was essentially unrevealing.    In addition, she is on medications which can affect the liver such as valproic acid, Abilify, and Prozac.  Depending on our work-up, her other providers may need to decide whether some of her medicine should be stopped or switched to see if this helps the LFTs.  Unfortunately, her mother admits that it would be very difficult to consider stopping any of her psychiatric medications.    With her history of psychiatric issues and liver disease and a low alkaline phosphatase, we would need to consider Tin's disease in our differential.  Her ceruloplasmin was normal.  She was not able to cooperate with a 24-hour urine copper test.  She did see her eye doctor on 5/24/2023 and her mother told us that they saw no evidence of Wen Fleischer rings.  The genetic test showed one abnormality but was not diagnostic for Tin's disease.    Given the unreliable FibroScan as well as concern for Tin's disease, we had her  undergo a liver biopsy.  This was done in August 2023 and confirmed cirrhosis.  Unfortunately, they could not determine an etiology.  Of note, the copper stain was negative.    Again, even though Tin disease seems somewhat unlikely, I cannot completely rule it out.  For that reason, I am comfortable just leaving her on the elemental zinc.  She is tolerating the low-dose of 50 mg once daily.  I would just keep her on that dose.    She is immune to hepatitis A and B.    With a history of an adenoma, these can enlarge and can even undergo malignant transformation.  We had her undergo an ultrasound that showed that these were relatively stable.  She should remain off of her birth control pills.  Now that we know that she has cirrhosis, she should get AFP and ultrasound every 6 months anyway.    With her cirrhosis, and given the high liver stiffness on FibroScan, she underwent an EGD in May 2024.  This was done with anesthesia assistance.  Luckily, there were no varices seen.  We could consider repeating this around May 2026.    Her parents do not feel that she would be able to do a colonoscopy preparation.  She has actually had a Cologuard in the past that was reportedly negative and they will pass on attempting colonoscopy.    Unfortunately, she has now developed ascites.  She did undergo paracentesis.  The ascites appeared most consistent with portal hypertensive ascites.  We are having some issues with her diuretics.  She is having some renal insufficiency and electrolyte abnormalities because of this.  We will have to continue to make adjustments in her diuretics and will likely need to use a combination of paracentesis and low-dose diuretics.  We did  them on a low-sodium diet.  If we cannot get her ascites under good control, we may need to consider other options such as TIPS or even transplant if the parents are interested in pursuing that option.  I did speak with them about both of these options.  I  asked them to discuss this amongst themselves and we will call them next week to get some ideas from them.    In fact, her parents were not overly interested in aggressive options such as TIPS or transplant.  Unfortunately, since then, her condition is worsened.  She was hospitalized for hepatorenal syndrome that did not respond to terlipressin.  She has been seen by palliative care and hospice.  At this point she is essentially comfort care.      I did bring up the option of placing an indwelling ascites catheter that they can drain at home for comfort and convenience.  Her parents are not interested in this and will continue with intermittent outpatient paracentesis.    Her alkaline phosphatase did go up significantly.  I did do isoenzymes to confirm that this is from a liver source.  AFP was negative.  MRI suggested no evidence of malignancy.  Given her very poor prognosis and the goals of care, I would not pursue this any further.    She will continue to follow with her hospice providers at this point.    I did encourage her parents to try and keep her hydrated.    Thank you for allowing me to participate in the care of this patient. Please feel free to contact me with any questions regarding their care.     Sincerely,     Tyrese Vann MD, KARINA, FAASLD, FACG.   Medical Director, Hepatology  Senior Attending Physician  [facility]  [facility]  Professor of Medicine  Division of Gastroenterology and Liver Disease  [employer]  [address] [place] [id number]  Phone: [phone]  Fax: [phone].      This document was generated with a computerized dictation system.  Because of this, there could be errors in grammar and/or content.             [1]   Past Medical History:  Diagnosis Date    Cirrhosis (Multi)     Disruptive mood dysregulation disorder (Multi)     Hyperlipidemia     Hypothyroidism      Rogelio syndrome ([facility])    [2]   Past Surgical History:  Procedure Laterality Date    US GUIDED NEEDLE LIVER BIOPSY  8/2/2023    US GUIDED NEEDLE LIVER BIOPSY 8/2/2023 [facility] US   [3]   Family History  Problem Relation Name Age of Onset    Diabetes Mother      Other (prediabetes) Father     [4] No Known Allergies  [5]   Current Outpatient Medications   Medication Sig Dispense Refill    ARIPiprazole (Abilify) 15 mg tablet Take 1 tablet (15 mg) by mouth once daily.      divalproex (Depakote ER) 250 mg 24 hr tablet Take 2 tablets (500 mg) by mouth once daily at bedtime.      FLUoxetine (PROzac) 20 mg capsule Take 3 capsules (60 mg) by mouth once daily.      hepatitis B virus vacc.rec,PF, (ENGERIX-B) 20 mcg/mL syringe Give a dose now and in 2 months 1 mL 2    polyethylene glycol (Glycolax, Miralax) 17 gram packet Take 17 g by mouth once daily. 30 each 1    sennosides-docusate sodium (Yesica-Colace) 8.6-50 mg tablet Take 2 tablets by mouth once daily at bedtime. 60 tablet 0    sodium chloride (Ocean) 0.65 % nasal spray Administer 1 spray into each nostril 4 times a day as needed (dryness). 30 mL 12    zinc gluconate 50 mg tablet Take 1 tablet (50 mg of elemental zinc) by mouth 2 times a day. 60 tablet 11    levothyroxine (Synthroid, Levoxyl) 25 mcg tablet TAKE 1 TABLET BY MOUTH EVERY DAY (Patient taking differently: Take 1 tablet (25 mcg) by mouth early in the morning..) 90 tablet 3     No current facility-administered medications for this visit.

## 2025-04-23 ENCOUNTER — APPOINTMENT (OUTPATIENT)
Dept: RADIOLOGY | Facility: HOSPITAL | Age: 50
End: 2025-04-23
Payer: MEDICARE

## 2025-04-23 ENCOUNTER — APPOINTMENT (OUTPATIENT)
Dept: GASTROENTEROLOGY | Facility: CLINIC | Age: 50
End: 2025-04-23
Payer: MEDICARE

## 2025-04-23 ENCOUNTER — TELEPHONE (OUTPATIENT)
Dept: PRIMARY CARE | Facility: CLINIC | Age: 50
End: 2025-04-23
Payer: MEDICARE

## 2025-04-30 ENCOUNTER — APPOINTMENT (OUTPATIENT)
Dept: RADIOLOGY | Facility: HOSPITAL | Age: 50
End: 2025-04-30
Payer: MEDICARE

## 2025-08-06 ENCOUNTER — APPOINTMENT (OUTPATIENT)
Dept: NEPHROLOGY | Facility: CLINIC | Age: 50
End: 2025-08-06
Payer: MEDICARE